# Patient Record
Sex: FEMALE | Race: WHITE | NOT HISPANIC OR LATINO | Employment: FULL TIME | URBAN - METROPOLITAN AREA
[De-identification: names, ages, dates, MRNs, and addresses within clinical notes are randomized per-mention and may not be internally consistent; named-entity substitution may affect disease eponyms.]

---

## 2017-02-08 ENCOUNTER — HOSPITAL ENCOUNTER (OUTPATIENT)
Dept: MAMMOGRAPHY | Facility: HOSPITAL | Age: 57
Discharge: HOME | End: 2017-02-08
Attending: FAMILY MEDICINE | Admitting: FAMILY MEDICINE

## 2017-02-14 ENCOUNTER — HOSPITAL ENCOUNTER (OUTPATIENT)
Dept: OTHER | Facility: HOSPITAL | Age: 57
Discharge: HOME | End: 2017-02-14
Attending: FAMILY MEDICINE | Admitting: FAMILY MEDICINE

## 2017-03-09 ENCOUNTER — HOSPITAL ENCOUNTER (OUTPATIENT)
Dept: OTHER | Facility: HOSPITAL | Age: 57
Discharge: HOME | End: 2017-03-09
Attending: FAMILY MEDICINE | Admitting: FAMILY MEDICINE

## 2017-09-20 ENCOUNTER — HOSPITAL ENCOUNTER (OUTPATIENT)
Dept: ULTRASOUND IMAGING | Facility: HOSPITAL | Age: 57
Discharge: HOME | End: 2017-09-20
Attending: SURGERY | Admitting: SURGERY

## 2017-09-20 LAB
ALBUMIN SERPL-MCNC: 3.8 G/DL (ref 3.5–4.8)
ALBUMIN/GLOB SERPL: 1.4 {RATIO} (ref 1–1.7)
ALP SERPL-CCNC: 71 IU/L (ref 32–91)
ALT SERPL-CCNC: 26 IU/L (ref 14–54)
ANION GAP SERPL CALC-SCNC: 9.2 MMOL/L (ref 10–20)
AST SERPL-CCNC: 25 IU/L (ref 15–41)
BASOPHILS # BLD AUTO: 0.1 10*3/UL (ref 0–0.2)
BASOPHILS NFR BLD AUTO: 1 % (ref 0–2)
BILIRUB SERPL-MCNC: 0.7 MG/DL (ref 0.3–1.2)
BILIRUB UR QL STRIP: NEGATIVE MG/DL
BUN SERPL-MCNC: 16 MG/DL (ref 8–20)
BUN/CREAT SERPL: 20 (ref 5.4–26.2)
CALCIUM SERPL-MCNC: 9 MG/DL (ref 8.9–10.3)
CASTS URNS QL MICRO: ABNORMAL /[LPF]
CHLORIDE SERPL-SCNC: 106 MMOL/L (ref 101–111)
CHOLEST SERPL-MCNC: 164 MG/DL
CHOLEST/HDLC SERPL: 5 {RATIO}
COLOR UR: YELLOW
CONV BACTERIA IN URINE MICRO: NEGATIVE
CONV CLARITY OF URINE: CLEAR
CONV CO2: 28 MMOL/L (ref 22–32)
CONV HYALINE CASTS IN URINE MICRO: 2 /[LPF] (ref 0–5)
CONV LDL CHOLESTEROL DIRECT: 103 MG/DL (ref 0–100)
CONV PROTEIN IN URINE BY AUTOMATED TEST STRIP: NEGATIVE MG/DL
CONV SMALL ROUND CELLS: ABNORMAL /[HPF]
CONV TOTAL PROTEIN: 6.6 G/DL (ref 6.1–7.9)
CONV UROBILINOGEN IN URINE BY AUTOMATED TEST STRIP: 1 MG/DL
CREAT UR-MCNC: 0.8 MG/DL (ref 0.4–1)
CULTURE INDICATED?: ABNORMAL
DIFFERENTIAL METHOD BLD: (no result)
EOSINOPHIL # BLD AUTO: 0.4 10*3/UL (ref 0–0.3)
EOSINOPHIL # BLD AUTO: 5 % (ref 0–3)
ERYTHROCYTE [DISTWIDTH] IN BLOOD BY AUTOMATED COUNT: 13.1 % (ref 11.5–14.5)
GLOBULIN UR ELPH-MCNC: 2.8 G/DL (ref 2.5–3.8)
GLUCOSE SERPL-MCNC: 96 MG/DL (ref 65–99)
GLUCOSE UR QL: NEGATIVE MG/DL
HCT VFR BLD AUTO: 42.6 % (ref 35–49)
HDLC SERPL-MCNC: 33 MG/DL
HGB BLD-MCNC: 14.8 G/DL (ref 12–15)
HGB UR QL STRIP: ABNORMAL
KETONES UR QL STRIP: NEGATIVE MG/DL
LDLC/HDLC SERPL: 3.2 {RATIO}
LEUKOCYTE ESTERASE UR QL STRIP: ABNORMAL
LIPID INTERPRETATION: ABNORMAL
LYMPHOCYTES # BLD AUTO: 1.7 10*3/UL (ref 0.8–4.8)
LYMPHOCYTES NFR BLD AUTO: 20 % (ref 18–42)
MCH RBC QN AUTO: 30.9 PG (ref 26–32)
MCHC RBC AUTO-ENTMCNC: 34.8 G/DL (ref 32–36)
MCV RBC AUTO: 88.9 FL (ref 80–94)
MONOCYTES # BLD AUTO: 0.5 10*3/UL (ref 0.1–1.3)
MONOCYTES NFR BLD AUTO: 7 % (ref 2–11)
NEUTROPHILS # BLD AUTO: 5.6 10*3/UL (ref 2.3–8.6)
NEUTROPHILS NFR BLD AUTO: 67 % (ref 50–75)
NITRITE UR QL STRIP: NEGATIVE
NRBC BLD AUTO-RTO: 0 /100{WBCS}
NRBC/RBC NFR BLD MANUAL: 0 10*3/UL
PH UR STRIP.AUTO: 8.5 [PH] (ref 4.5–8)
PLATELET # BLD AUTO: 266 10*3/UL (ref 150–450)
PMV BLD AUTO: 7.3 FL (ref 7.4–10.4)
POTASSIUM SERPL-SCNC: 4.2 MMOL/L (ref 3.6–5.1)
RBC # BLD AUTO: 4.79 10*6/UL (ref 4–5.4)
RBC #/AREA URNS HPF: 7 /[HPF] (ref 0–3)
SODIUM SERPL-SCNC: 139 MMOL/L (ref 136–144)
SP GR UR: 1.02 (ref 1–1.03)
SPERM URNS QL MICRO: ABNORMAL /[HPF]
SQUAMOUS SPT QL MICRO: 3 /[HPF] (ref 0–5)
T4 FREE SERPL-MCNC: 0.85 NG/DL (ref 0.58–1.64)
TRIGL SERPL-MCNC: 162 MG/DL
TSH SERPL-ACNC: 0.6 UIU/ML (ref 0.34–5.6)
UNIDENT CRYS URNS QL MICRO: ABNORMAL /[HPF]
VIT B12 SERPL-MCNC: 725 PG/ML (ref 180–914)
VLDLC SERPL CALC-MCNC: 28 MG/DL
WBC # BLD AUTO: 8.4 10*3/UL (ref 4.5–11.5)
WBC #/AREA URNS HPF: 5 /[HPF] (ref 0–5)
YEAST SPEC QL WET PREP: ABNORMAL /[HPF]

## 2017-09-21 ENCOUNTER — CONVERSION ENCOUNTER (OUTPATIENT)
Dept: FAMILY MEDICINE CLINIC | Facility: CLINIC | Age: 57
End: 2017-09-21

## 2017-10-02 ENCOUNTER — CONVERSION ENCOUNTER (OUTPATIENT)
Dept: FAMILY MEDICINE CLINIC | Facility: CLINIC | Age: 57
End: 2017-10-02

## 2017-10-10 ENCOUNTER — HOSPITAL ENCOUNTER (OUTPATIENT)
Dept: PREADMISSION TESTING | Facility: HOSPITAL | Age: 57
Discharge: HOME | End: 2017-10-10
Attending: SURGERY | Admitting: SURGERY

## 2017-10-10 LAB
ALBUMIN SERPL-MCNC: 4.1 G/DL (ref 3.5–4.8)
ALBUMIN/GLOB SERPL: 1.6 {RATIO} (ref 1–1.7)
ALP SERPL-CCNC: 71 IU/L (ref 32–91)
ALT SERPL-CCNC: 24 IU/L (ref 14–54)
ANION GAP SERPL CALC-SCNC: 11.8 MMOL/L (ref 10–20)
AST SERPL-CCNC: 26 IU/L (ref 15–41)
BASOPHILS # BLD AUTO: 0.1 10*3/UL (ref 0–0.2)
BASOPHILS NFR BLD AUTO: 1 % (ref 0–2)
BILIRUB SERPL-MCNC: 0.5 MG/DL (ref 0.3–1.2)
BUN SERPL-MCNC: 13 MG/DL (ref 8–20)
BUN/CREAT SERPL: 18.6 (ref 5.4–26.2)
CALCIUM SERPL-MCNC: 9.1 MG/DL (ref 8.9–10.3)
CHLORIDE SERPL-SCNC: 101 MMOL/L (ref 101–111)
CONV CO2: 27 MMOL/L (ref 22–32)
CONV TOTAL PROTEIN: 6.7 G/DL (ref 6.1–7.9)
CREAT UR-MCNC: 0.7 MG/DL (ref 0.4–1)
DIFFERENTIAL METHOD BLD: (no result)
EOSINOPHIL # BLD AUTO: 0.6 10*3/UL (ref 0–0.3)
EOSINOPHIL # BLD AUTO: 7 % (ref 0–3)
ERYTHROCYTE [DISTWIDTH] IN BLOOD BY AUTOMATED COUNT: 13.5 % (ref 11.5–14.5)
GLOBULIN UR ELPH-MCNC: 2.6 G/DL (ref 2.5–3.8)
GLUCOSE SERPL-MCNC: 90 MG/DL (ref 65–99)
HCT VFR BLD AUTO: 43.2 % (ref 35–49)
HGB BLD-MCNC: 14.9 G/DL (ref 12–15)
LYMPHOCYTES # BLD AUTO: 2.6 10*3/UL (ref 0.8–4.8)
LYMPHOCYTES NFR BLD AUTO: 30 % (ref 18–42)
MCH RBC QN AUTO: 31.1 PG (ref 26–32)
MCHC RBC AUTO-ENTMCNC: 34.4 G/DL (ref 32–36)
MCV RBC AUTO: 90.3 FL (ref 80–94)
MONOCYTES # BLD AUTO: 0.6 10*3/UL (ref 0.1–1.3)
MONOCYTES NFR BLD AUTO: 7 % (ref 2–11)
NEUTROPHILS # BLD AUTO: 4.7 10*3/UL (ref 2.3–8.6)
NEUTROPHILS NFR BLD AUTO: 55 % (ref 50–75)
NRBC BLD AUTO-RTO: 0 /100{WBCS}
NRBC/RBC NFR BLD MANUAL: 0 10*3/UL
PLATELET # BLD AUTO: 251 10*3/UL (ref 150–450)
PMV BLD AUTO: 7.8 FL (ref 7.4–10.4)
POTASSIUM SERPL-SCNC: 3.8 MMOL/L (ref 3.6–5.1)
RBC # BLD AUTO: 4.79 10*6/UL (ref 4–5.4)
SODIUM SERPL-SCNC: 136 MMOL/L (ref 136–144)
WBC # BLD AUTO: 8.4 10*3/UL (ref 4.5–11.5)

## 2017-10-13 ENCOUNTER — HOSPITAL ENCOUNTER (OUTPATIENT)
Dept: PREOP | Facility: HOSPITAL | Age: 57
Setting detail: HOSPITAL OUTPATIENT SURGERY
Discharge: HOME | End: 2017-10-13
Attending: SURGERY | Admitting: SURGERY

## 2017-10-17 ENCOUNTER — HOSPITAL ENCOUNTER (OUTPATIENT)
Dept: MAMMOGRAPHY | Facility: HOSPITAL | Age: 57
Discharge: HOME | End: 2017-10-17
Attending: FAMILY MEDICINE | Admitting: FAMILY MEDICINE

## 2018-10-17 ENCOUNTER — HOSPITAL ENCOUNTER (OUTPATIENT)
Dept: MAMMOGRAPHY | Facility: HOSPITAL | Age: 58
Discharge: HOME | End: 2018-10-17
Attending: FAMILY MEDICINE | Admitting: FAMILY MEDICINE

## 2018-10-19 ENCOUNTER — HOSPITAL ENCOUNTER (OUTPATIENT)
Dept: LAB | Facility: HOSPITAL | Age: 58
Discharge: HOME | End: 2018-10-19
Attending: FAMILY MEDICINE | Admitting: FAMILY MEDICINE

## 2018-10-19 LAB
25(OH)D3 SERPL-MCNC: 61 NG/ML (ref 30–100)
ALBUMIN SERPL-MCNC: 3.8 G/DL (ref 3.5–4.8)
ALBUMIN/GLOB SERPL: 1.3 {RATIO} (ref 1–1.7)
ALP SERPL-CCNC: 84 IU/L (ref 32–91)
ALT SERPL-CCNC: 31 IU/L (ref 14–54)
ANA SER QL IA: NEGATIVE
ANION GAP SERPL CALC-SCNC: 12.2 MMOL/L (ref 10–20)
AST SERPL-CCNC: 35 IU/L (ref 15–41)
BASOPHILS # BLD AUTO: 0.1 10*3/UL (ref 0–0.2)
BASOPHILS NFR BLD AUTO: 1 % (ref 0–2)
BILIRUB SERPL-MCNC: 0.9 MG/DL (ref 0.3–1.2)
BILIRUB UR QL STRIP: NEGATIVE MG/DL
BUN SERPL-MCNC: 14 MG/DL (ref 8–20)
BUN/CREAT SERPL: 17.5 (ref 5.4–26.2)
CALCIUM SERPL-MCNC: 9.4 MG/DL (ref 8.9–10.3)
CASTS URNS QL MICRO: NORMAL /[LPF]
CHLORIDE SERPL-SCNC: 100 MMOL/L (ref 101–111)
COLOR UR: YELLOW
CONV BACTERIA IN URINE MICRO: NEGATIVE
CONV CLARITY OF URINE: CLEAR
CONV CO2: 28 MMOL/L (ref 22–32)
CONV HYALINE CASTS IN URINE MICRO: 1 /[LPF] (ref 0–5)
CONV PROTEIN IN URINE BY AUTOMATED TEST STRIP: NEGATIVE MG/DL
CONV SMALL ROUND CELLS: NORMAL /[HPF]
CONV TOTAL PROTEIN: 6.7 G/DL (ref 6.1–7.9)
CONV UROBILINOGEN IN URINE BY AUTOMATED TEST STRIP: 0.2 MG/DL
CREAT UR-MCNC: 0.8 MG/DL (ref 0.4–1)
CULTURE INDICATED?: NORMAL
DIFFERENTIAL METHOD BLD: (no result)
EOSINOPHIL # BLD AUTO: 0.4 10*3/UL (ref 0–0.3)
EOSINOPHIL # BLD AUTO: 4 % (ref 0–3)
ERYTHROCYTE [DISTWIDTH] IN BLOOD BY AUTOMATED COUNT: (no result) %
ERYTHROCYTE [DISTWIDTH] IN BLOOD BY AUTOMATED COUNT: 13.5 % (ref 11.5–14.5)
ERYTHROCYTE [SEDIMENTATION RATE] IN BLOOD BY WESTERGREN METHOD: 10 MM/HR (ref 0–30)
GLOBULIN UR ELPH-MCNC: 2.9 G/DL (ref 2.5–3.8)
GLUCOSE SERPL-MCNC: 96 MG/DL (ref 65–99)
GLUCOSE UR QL: NEGATIVE MG/DL
HCT VFR BLD AUTO: (no result) %
HCT VFR BLD AUTO: 43.8 % (ref 35–49)
HGB BLD-MCNC: (no result) G/DL
HGB BLD-MCNC: 14.8 G/DL (ref 12–15)
HGB UR QL STRIP: NEGATIVE
KETONES UR QL STRIP: NEGATIVE MG/DL
LEUKOCYTE ESTERASE UR QL STRIP: NEGATIVE
LYMPHOCYTES # BLD AUTO: 1.6 10*3/UL (ref 0.8–4.8)
LYMPHOCYTES NFR BLD AUTO: 18 % (ref 18–42)
MCH RBC QN AUTO: (no result) PG
MCH RBC QN AUTO: 31 PG (ref 26–32)
MCHC RBC AUTO-ENTMCNC: (no result) G/DL
MCHC RBC AUTO-ENTMCNC: 33.8 G/DL (ref 32–36)
MCV RBC AUTO: (no result) FL
MCV RBC AUTO: 91.5 FL (ref 80–94)
MONOCYTES # BLD AUTO: 0.6 10*3/UL (ref 0.1–1.3)
MONOCYTES NFR BLD AUTO: 6 % (ref 2–11)
NEUTROPHILS # BLD AUTO: 6.2 10*3/UL (ref 2.3–8.6)
NEUTROPHILS NFR BLD AUTO: 71 % (ref 50–75)
NITRITE UR QL STRIP: NEGATIVE
NRBC BLD AUTO-RTO: 0 /100{WBCS}
NRBC/RBC NFR BLD MANUAL: 0 10*3/UL
PH UR STRIP.AUTO: 7.5 [PH] (ref 4.5–8)
PLATELET # BLD AUTO: (no result) 10*3/UL
PLATELET # BLD AUTO: 252 10*3/UL (ref 150–450)
PMV BLD AUTO: (no result) FL
PMV BLD AUTO: 7.8 FL (ref 7.4–10.4)
POTASSIUM SERPL-SCNC: 4.2 MMOL/L (ref 3.6–5.1)
RBC # BLD AUTO: (no result) 10*6/UL
RBC # BLD AUTO: 4.79 10*6/UL (ref 4–5.4)
RBC #/AREA URNS HPF: 3 /[HPF] (ref 0–3)
SODIUM SERPL-SCNC: 136 MMOL/L (ref 136–144)
SP GR UR: 1.02 (ref 1–1.03)
SPERM URNS QL MICRO: NORMAL /[HPF]
SQUAMOUS SPT QL MICRO: 2 /[HPF] (ref 0–5)
T4 FREE SERPL-MCNC: 0.77 NG/DL (ref 0.58–1.64)
TSH SERPL-ACNC: 1.24 UIU/ML (ref 0.34–5.6)
UNIDENT CRYS URNS QL MICRO: NORMAL /[HPF]
VIT B12 SERPL-MCNC: 1350 PG/ML (ref 180–914)
WBC # BLD AUTO: (no result) 10*3/UL
WBC # BLD AUTO: 8.7 10*3/UL (ref 4.5–11.5)
WBC #/AREA URNS HPF: 1 /[HPF] (ref 0–5)
YEAST SPEC QL WET PREP: NORMAL /[HPF]

## 2018-10-26 ENCOUNTER — HOSPITAL ENCOUNTER (OUTPATIENT)
Dept: FAMILY MEDICINE CLINIC | Facility: CLINIC | Age: 58
Setting detail: SPECIMEN
Discharge: HOME | End: 2018-10-26
Attending: FAMILY MEDICINE | Admitting: FAMILY MEDICINE

## 2018-10-26 ENCOUNTER — CONVERSION ENCOUNTER (OUTPATIENT)
Dept: FAMILY MEDICINE CLINIC | Facility: CLINIC | Age: 58
End: 2018-10-26

## 2018-10-31 ENCOUNTER — HOSPITAL ENCOUNTER (OUTPATIENT)
Dept: GENERAL RADIOLOGY | Facility: HOSPITAL | Age: 58
Discharge: HOME | End: 2018-10-31
Attending: FAMILY MEDICINE | Admitting: FAMILY MEDICINE

## 2019-03-13 ENCOUNTER — HOSPITAL ENCOUNTER (OUTPATIENT)
Dept: LAB | Facility: HOSPITAL | Age: 59
Discharge: HOME | End: 2019-03-13
Attending: FAMILY MEDICINE | Admitting: FAMILY MEDICINE

## 2019-03-13 LAB
ALBUMIN SERPL-MCNC: 3.9 G/DL (ref 3.5–4.8)
ALBUMIN/GLOB SERPL: 1.3 {RATIO} (ref 1–1.7)
ALP SERPL-CCNC: 91 IU/L (ref 32–91)
ALT SERPL-CCNC: 30 IU/L (ref 14–54)
ANION GAP SERPL CALC-SCNC: 12.5 MMOL/L (ref 10–20)
AST SERPL-CCNC: 31 IU/L (ref 15–41)
BILIRUB SERPL-MCNC: 0.3 MG/DL (ref 0.3–1.2)
BILIRUB UR QL STRIP: NEGATIVE MG/DL
BUN SERPL-MCNC: 19 MG/DL (ref 8–20)
BUN/CREAT SERPL: 31.7 (ref 5.4–26.2)
CALCIUM SERPL-MCNC: 8.9 MG/DL (ref 8.9–10.3)
CASTS URNS QL MICRO: ABNORMAL /[LPF]
CHLORIDE SERPL-SCNC: 98 MMOL/L (ref 101–111)
COLOR UR: YELLOW
CONV BACTERIA IN URINE MICRO: NEGATIVE
CONV CLARITY OF URINE: CLEAR
CONV CO2: 28 MMOL/L (ref 22–32)
CONV HYALINE CASTS IN URINE MICRO: 1 /[LPF] (ref 0–5)
CONV PROTEIN IN URINE BY AUTOMATED TEST STRIP: NEGATIVE MG/DL
CONV SMALL ROUND CELLS: ABNORMAL /[HPF]
CONV TOTAL PROTEIN: 6.8 G/DL (ref 6.1–7.9)
CONV UROBILINOGEN IN URINE BY AUTOMATED TEST STRIP: 0.2 MG/DL
CREAT UR-MCNC: 0.6 MG/DL (ref 0.4–1)
CULTURE INDICATED?: ABNORMAL
GLOBULIN UR ELPH-MCNC: 2.9 G/DL (ref 2.5–3.8)
GLUCOSE SERPL-MCNC: 96 MG/DL (ref 65–99)
GLUCOSE UR QL: NEGATIVE MG/DL
HGB UR QL STRIP: NEGATIVE
KETONES UR QL STRIP: NEGATIVE MG/DL
LEUKOCYTE ESTERASE UR QL STRIP: ABNORMAL
MAGNESIUM SERPL-MCNC: 2.1 MG/DL (ref 1.8–2.5)
NITRITE UR QL STRIP: NEGATIVE
PH UR STRIP.AUTO: 7 [PH] (ref 4.5–8)
POTASSIUM SERPL-SCNC: 3.5 MMOL/L (ref 3.6–5.1)
RBC #/AREA URNS HPF: 2 /[HPF] (ref 0–3)
SODIUM SERPL-SCNC: 135 MMOL/L (ref 136–144)
SP GR UR: 1.01 (ref 1–1.03)
SPERM URNS QL MICRO: ABNORMAL /[HPF]
SQUAMOUS SPT QL MICRO: 1 /[HPF] (ref 0–5)
UNIDENT CRYS URNS QL MICRO: ABNORMAL /[HPF]
WBC #/AREA URNS HPF: 2 /[HPF] (ref 0–5)
YEAST SPEC QL WET PREP: ABNORMAL /[HPF]

## 2019-05-10 ENCOUNTER — HOSPITAL ENCOUNTER (OUTPATIENT)
Dept: LAB | Facility: HOSPITAL | Age: 59
Discharge: HOME | End: 2019-05-10
Attending: FAMILY MEDICINE | Admitting: FAMILY MEDICINE

## 2019-05-10 LAB
ANION GAP SERPL CALC-SCNC: 14.9 MMOL/L (ref 10–20)
BUN SERPL-MCNC: 12 MG/DL (ref 8–20)
BUN/CREAT SERPL: 15 (ref 5.4–26.2)
CALCIUM SERPL-MCNC: 9.2 MG/DL (ref 8.9–10.3)
CHLORIDE SERPL-SCNC: 98 MMOL/L (ref 101–111)
CONV CO2: 25 MMOL/L (ref 22–32)
CREAT UR-MCNC: 0.8 MG/DL (ref 0.4–1)
GLUCOSE SERPL-MCNC: 90 MG/DL (ref 65–99)
POTASSIUM SERPL-SCNC: 3.9 MMOL/L (ref 3.6–5.1)
SODIUM SERPL-SCNC: 134 MMOL/L (ref 136–144)

## 2019-06-04 VITALS
DIASTOLIC BLOOD PRESSURE: 80 MMHG | SYSTOLIC BLOOD PRESSURE: 116 MMHG | SYSTOLIC BLOOD PRESSURE: 120 MMHG | HEART RATE: 90 BPM | OXYGEN SATURATION: 97 % | RESPIRATION RATE: 16 BRPM | WEIGHT: 155 LBS | RESPIRATION RATE: 16 BRPM | BODY MASS INDEX: 29.48 KG/M2 | SYSTOLIC BLOOD PRESSURE: 116 MMHG | HEART RATE: 72 BPM | HEIGHT: 61 IN | BODY MASS INDEX: 29.27 KG/M2 | HEART RATE: 88 BPM | WEIGHT: 142 LBS | WEIGHT: 156 LBS | BODY MASS INDEX: 26.83 KG/M2 | DIASTOLIC BLOOD PRESSURE: 80 MMHG | DIASTOLIC BLOOD PRESSURE: 75 MMHG

## 2019-07-09 RX ORDER — POTASSIUM CHLORIDE 20 MEQ/1
TABLET, EXTENDED RELEASE ORAL
Qty: 20 TABLET | Refills: 1 | Status: SHIPPED | OUTPATIENT
Start: 2019-07-09 | End: 2019-08-22 | Stop reason: SDUPTHER

## 2019-08-22 RX ORDER — POTASSIUM CHLORIDE 20 MEQ/1
TABLET, EXTENDED RELEASE ORAL
Qty: 20 TABLET | Refills: 0 | Status: SHIPPED | OUTPATIENT
Start: 2019-08-22 | End: 2019-12-04 | Stop reason: SDUPTHER

## 2019-09-03 RX ORDER — LOSARTAN POTASSIUM AND HYDROCHLOROTHIAZIDE 25; 100 MG/1; MG/1
TABLET ORAL
Qty: 90 TABLET | Refills: 9 | Status: SHIPPED | OUTPATIENT
Start: 2019-09-03 | End: 2019-12-04 | Stop reason: SDUPTHER

## 2019-10-10 ENCOUNTER — TELEPHONE (OUTPATIENT)
Dept: FAMILY MEDICINE CLINIC | Facility: CLINIC | Age: 59
End: 2019-10-10

## 2019-10-14 DIAGNOSIS — Z12.39 SCREENING FOR BREAST CANCER: Primary | ICD-10-CM

## 2019-11-06 ENCOUNTER — TRANSCRIBE ORDERS (OUTPATIENT)
Dept: ADMINISTRATIVE | Facility: HOSPITAL | Age: 59
End: 2019-11-06

## 2019-11-06 DIAGNOSIS — Z12.39 SCREENING BREAST EXAMINATION: Primary | ICD-10-CM

## 2019-11-14 ENCOUNTER — HOSPITAL ENCOUNTER (OUTPATIENT)
Dept: MAMMOGRAPHY | Facility: HOSPITAL | Age: 59
Discharge: HOME | End: 2019-11-14
Admitting: FAMILY MEDICINE

## 2019-11-14 DIAGNOSIS — Z12.39 SCREENING BREAST EXAMINATION: ICD-10-CM

## 2019-11-14 PROCEDURE — 77067 SCR MAMMO BI INCL CAD: CPT

## 2019-11-14 PROCEDURE — 77063 BREAST TOMOSYNTHESIS BI: CPT

## 2019-11-27 ENCOUNTER — TELEPHONE (OUTPATIENT)
Dept: FAMILY MEDICINE CLINIC | Facility: CLINIC | Age: 59
End: 2019-11-27

## 2019-11-27 ENCOUNTER — LAB (OUTPATIENT)
Dept: LAB | Facility: HOSPITAL | Age: 59
End: 2019-11-27

## 2019-11-27 DIAGNOSIS — Z00.00 ANNUAL PHYSICAL EXAM: Primary | ICD-10-CM

## 2019-11-27 DIAGNOSIS — Z00.00 ANNUAL PHYSICAL EXAM: ICD-10-CM

## 2019-11-27 PROCEDURE — 82306 VITAMIN D 25 HYDROXY: CPT

## 2019-11-27 PROCEDURE — 36415 COLL VENOUS BLD VENIPUNCTURE: CPT

## 2019-11-27 PROCEDURE — 80061 LIPID PANEL: CPT

## 2019-11-27 PROCEDURE — 84439 ASSAY OF FREE THYROXINE: CPT

## 2019-11-27 PROCEDURE — 80053 COMPREHEN METABOLIC PANEL: CPT

## 2019-11-27 PROCEDURE — 81001 URINALYSIS AUTO W/SCOPE: CPT

## 2019-11-27 PROCEDURE — 84443 ASSAY THYROID STIM HORMONE: CPT

## 2019-11-27 PROCEDURE — 85025 COMPLETE CBC W/AUTO DIFF WBC: CPT

## 2019-11-27 PROCEDURE — 82607 VITAMIN B-12: CPT

## 2019-11-27 NOTE — TELEPHONE ENCOUNTER
Patient called and she will have her labs done at [facility]. Orders already in. Thank you.

## 2019-11-27 NOTE — TELEPHONE ENCOUNTER
Patient called stating that she is scheduled for physical on 12/4 and would like to have fasting labs done on Friday 11/29 at [facility]. Please enter lab order.

## 2019-11-27 NOTE — TELEPHONE ENCOUNTER
Called left pt a message that we will not be here on 11-29 to come in on 12-2 or 12-3 for labs . Orders are in.

## 2019-11-28 LAB
25(OH)D3 SERPL-MCNC: 56 NG/ML (ref 30–100)
ALBUMIN SERPL-MCNC: 4.4 G/DL (ref 3.5–5.2)
ALBUMIN/GLOB SERPL: 1.5 G/DL
ALP SERPL-CCNC: 73 U/L (ref 39–117)
ALT SERPL W P-5'-P-CCNC: 20 U/L (ref 1–33)
ANION GAP SERPL CALCULATED.3IONS-SCNC: 10.7 MMOL/L (ref 5–15)
AST SERPL-CCNC: 21 U/L (ref 1–32)
BACTERIA UR QL AUTO: NORMAL /HPF
BASOPHILS # BLD AUTO: 0.08 10*3/MM3 (ref 0–0.2)
BASOPHILS NFR BLD AUTO: 1.1 % (ref 0–1.5)
BILIRUB SERPL-MCNC: 0.2 MG/DL (ref 0.2–1.2)
BILIRUB UR QL STRIP: NEGATIVE
BUN BLD-MCNC: 14 MG/DL (ref 8–23)
BUN/CREAT SERPL: 18.7 (ref 7–25)
CALCIUM SPEC-SCNC: 9.3 MG/DL (ref 8.6–10.5)
CHLORIDE SERPL-SCNC: 102 MMOL/L (ref 98–107)
CHOLEST SERPL-MCNC: 178 MG/DL (ref 0–200)
CLARITY UR: CLEAR
CO2 SERPL-SCNC: 28.3 MMOL/L (ref 22–29)
COLOR UR: YELLOW
CREAT BLD-MCNC: 0.75 MG/DL (ref 0.57–1)
DEPRECATED RDW RBC AUTO: 41.6 FL (ref 37–54)
EOSINOPHIL # BLD AUTO: 0.44 10*3/MM3 (ref 0–0.4)
EOSINOPHIL NFR BLD AUTO: 6.3 % (ref 0.3–6.2)
ERYTHROCYTE [DISTWIDTH] IN BLOOD BY AUTOMATED COUNT: 12.3 % (ref 12.3–15.4)
GFR SERPL CREATININE-BSD FRML MDRD: 79 ML/MIN/1.73
GLOBULIN UR ELPH-MCNC: 2.9 GM/DL
GLUCOSE BLD-MCNC: 81 MG/DL (ref 65–99)
GLUCOSE UR STRIP-MCNC: NEGATIVE MG/DL
HCT VFR BLD AUTO: 44 % (ref 34–46.6)
HDLC SERPL-MCNC: 36 MG/DL (ref 40–60)
HGB BLD-MCNC: 14.8 G/DL (ref 12–15.9)
HGB UR QL STRIP.AUTO: NEGATIVE
HYALINE CASTS UR QL AUTO: NORMAL /LPF
IMM GRANULOCYTES # BLD AUTO: 0.03 10*3/MM3 (ref 0–0.05)
IMM GRANULOCYTES NFR BLD AUTO: 0.4 % (ref 0–0.5)
KETONES UR QL STRIP: NEGATIVE
LDLC SERPL CALC-MCNC: 103 MG/DL (ref 0–100)
LDLC/HDLC SERPL: 2.85 {RATIO}
LEUKOCYTE ESTERASE UR QL STRIP.AUTO: ABNORMAL
LYMPHOCYTES # BLD AUTO: 2.63 10*3/MM3 (ref 0.7–3.1)
LYMPHOCYTES NFR BLD AUTO: 37.4 % (ref 19.6–45.3)
MCH RBC QN AUTO: 31.2 PG (ref 26.6–33)
MCHC RBC AUTO-ENTMCNC: 33.6 G/DL (ref 31.5–35.7)
MCV RBC AUTO: 92.8 FL (ref 79–97)
MONOCYTES # BLD AUTO: 0.52 10*3/MM3 (ref 0.1–0.9)
MONOCYTES NFR BLD AUTO: 7.4 % (ref 5–12)
NEUTROPHILS # BLD AUTO: 3.34 10*3/MM3 (ref 1.7–7)
NEUTROPHILS NFR BLD AUTO: 47.4 % (ref 42.7–76)
NITRITE UR QL STRIP: NEGATIVE
NRBC BLD AUTO-RTO: 0 /100 WBC (ref 0–0.2)
PH UR STRIP.AUTO: 8 [PH] (ref 5–8)
PLATELET # BLD AUTO: 256 10*3/MM3 (ref 140–450)
PMV BLD AUTO: 9.5 FL (ref 6–12)
POTASSIUM BLD-SCNC: 3.9 MMOL/L (ref 3.5–5.2)
PROT SERPL-MCNC: 7.3 G/DL (ref 6–8.5)
PROT UR QL STRIP: NEGATIVE
RBC # BLD AUTO: 4.74 10*6/MM3 (ref 3.77–5.28)
RBC # UR: NORMAL /HPF
REF LAB TEST METHOD: NORMAL
SODIUM BLD-SCNC: 141 MMOL/L (ref 136–145)
SP GR UR STRIP: 1.01 (ref 1–1.03)
SQUAMOUS #/AREA URNS HPF: NORMAL /HPF
T4 FREE SERPL-MCNC: 1.21 NG/DL (ref 0.93–1.7)
TRIGL SERPL-MCNC: 197 MG/DL (ref 0–150)
TSH SERPL DL<=0.05 MIU/L-ACNC: 1.17 UIU/ML (ref 0.27–4.2)
UROBILINOGEN UR QL STRIP: ABNORMAL
VIT B12 BLD-MCNC: 1126 PG/ML (ref 211–946)
VLDLC SERPL-MCNC: 39.4 MG/DL (ref 5–40)
WBC NRBC COR # BLD: 7.04 10*3/MM3 (ref 3.4–10.8)
WBC UR QL AUTO: NORMAL /HPF

## 2019-12-02 DIAGNOSIS — E78.2 MIXED HYPERLIPIDEMIA: Primary | ICD-10-CM

## 2019-12-04 ENCOUNTER — OFFICE VISIT (OUTPATIENT)
Dept: FAMILY MEDICINE CLINIC | Facility: CLINIC | Age: 59
End: 2019-12-04

## 2019-12-04 VITALS
DIASTOLIC BLOOD PRESSURE: 76 MMHG | RESPIRATION RATE: 16 BRPM | OXYGEN SATURATION: 97 % | WEIGHT: 155 LBS | BODY MASS INDEX: 29.27 KG/M2 | SYSTOLIC BLOOD PRESSURE: 118 MMHG | HEIGHT: 61 IN | TEMPERATURE: 98.2 F | HEART RATE: 91 BPM

## 2019-12-04 DIAGNOSIS — F10.20 ALCOHOLISM (HCC): ICD-10-CM

## 2019-12-04 DIAGNOSIS — E55.9 VITAMIN D DEFICIENCY: ICD-10-CM

## 2019-12-04 DIAGNOSIS — K21.00 GASTROESOPHAGEAL REFLUX DISEASE WITH ESOPHAGITIS: ICD-10-CM

## 2019-12-04 DIAGNOSIS — I10 ESSENTIAL HYPERTENSION: ICD-10-CM

## 2019-12-04 DIAGNOSIS — Z12.4 SCREENING FOR CERVICAL CANCER: ICD-10-CM

## 2019-12-04 DIAGNOSIS — E78.2 MIXED HYPERLIPIDEMIA: ICD-10-CM

## 2019-12-04 DIAGNOSIS — I25.10 ATHEROSCLEROSIS OF NATIVE CORONARY ARTERY OF NATIVE HEART WITHOUT ANGINA PECTORIS: ICD-10-CM

## 2019-12-04 DIAGNOSIS — Z00.00 PREVENTATIVE HEALTH CARE: Primary | ICD-10-CM

## 2019-12-04 PROBLEM — K80.10 CHOLELITHIASIS WITH CHOLECYSTITIS: Status: ACTIVE | Noted: 2017-09-21

## 2019-12-04 PROCEDURE — 99396 PREV VISIT EST AGE 40-64: CPT | Performed by: FAMILY MEDICINE

## 2019-12-04 RX ORDER — ASPIRIN 325 MG
TABLET, DELAYED RELEASE (ENTERIC COATED) ORAL
COMMUNITY
Start: 2017-09-21 | End: 2022-05-17

## 2019-12-04 RX ORDER — FLUOXETINE HYDROCHLORIDE 40 MG/1
2 CAPSULE ORAL EVERY 24 HOURS
COMMUNITY
Start: 2017-09-22 | End: 2020-02-24

## 2019-12-04 RX ORDER — IBUPROFEN 200 MG
CAPSULE ORAL EVERY 24 HOURS
COMMUNITY
Start: 2018-10-26 | End: 2022-05-12

## 2019-12-04 RX ORDER — CHOLECALCIFEROL (VITAMIN D3) 50 MCG
TABLET ORAL EVERY 24 HOURS
COMMUNITY
Start: 2018-10-26

## 2019-12-04 RX ORDER — LOSARTAN POTASSIUM AND HYDROCHLOROTHIAZIDE 25; 100 MG/1; MG/1
1 TABLET ORAL DAILY
Qty: 90 TABLET | Refills: 2 | Status: SHIPPED | OUTPATIENT
Start: 2019-12-04 | End: 2020-03-03

## 2019-12-04 RX ORDER — POTASSIUM CHLORIDE 20 MEQ/1
20 TABLET, EXTENDED RELEASE ORAL 2 TIMES DAILY
Qty: 90 TABLET | Refills: 2 | Status: SHIPPED | OUTPATIENT
Start: 2019-12-04 | End: 2020-03-03

## 2019-12-04 NOTE — ASSESSMENT & PLAN NOTE
Coronary artery disease is improving with treatment.  Continue current treatment regimen.  Dietary sodium restriction.  Weight loss.  Regular aerobic exercise.  Stop smoking.  Continue current medications.  Medication changes per orders.  Cardiac status will be reassessed in 3 months.    Cath over 10 yrs ago.  Minimal disease  Her blood pressure is well controlled and her lipids are well managed.  She is not having any signs and symptoms of coronary artery disease.  She does need to start exercising more and to lose some weight.  She has plans to do that.

## 2019-12-09 LAB
AGE GDLN ACOG TESTING: NORMAL
CHROM ANALY OVERALL INTERP-IMP: NORMAL
CONV .: NORMAL
CONV PERFORMED BY:: NORMAL
DX ICD CODE: NORMAL
HIV 1 & 2 AB SER-IMP: NORMAL
HPV I/H RISK 1 DNA CVX QL PROBE+SIG AMP: NEGATIVE
REF LAB TEST METHOD: NORMAL
STAT OF ADQ CVX/VAG CYTO-IMP: NORMAL

## 2019-12-25 PROBLEM — Z12.4 SCREENING FOR CERVICAL CANCER: Status: ACTIVE | Noted: 2019-12-25

## 2019-12-25 NOTE — ASSESSMENT & PLAN NOTE
Patient here today for a complete history and physical.  Her past medical history is remarkable only for the disorders as listed below.  All of these are under excellent control.  She had a complete physical today which was normal and we have lab test pending which we anticipate will be normal.  She will try to follow a low-carb diet and start exercise.  Mammogram will be updated.  Colonoscopy is updated.  We will probably suggest a DEXA scan in the next few years.  Immunizations are up-to-date for age.

## 2019-12-25 NOTE — ASSESSMENT & PLAN NOTE
Hypertension is improving with treatment.  Continue current treatment regimen.  Dietary sodium restriction.  Weight loss.  Regular aerobic exercise.  Continue current medications.  Blood pressure will be reassessed in 3 months.    Continue losartan/HCTZ.  Her blood pressure is excellent

## 2019-12-25 NOTE — ASSESSMENT & PLAN NOTE
Pelvic exam done today as part of her complete physical.  Pap smear of the cervix taken.  Pap has returned normal.  She has some atrophic changes of the cells but no suspicious changes of early cancer.  Exam otherwise normal

## 2019-12-25 NOTE — PATIENT INSTRUCTIONS
"Carbohydrate Counting for Diabetes Mellitus, Adult    Carbohydrate counting is a method of keeping track of how many carbohydrates you eat. Eating carbohydrates naturally increases the amount of sugar (glucose) in the blood. Counting how many carbohydrates you eat helps keep your blood glucose within normal limits, which helps you manage your diabetes (diabetes mellitus).  It is important to know how many carbohydrates you can safely have in each meal. This is different for every person. A diet and nutrition specialist (registered dietitian) can help you make a meal plan and calculate how many carbohydrates you should have at each meal and snack.  Carbohydrates are found in the following foods:  · Grains, such as breads and cereals.  · Dried beans and soy products.  · Starchy vegetables, such as potatoes, peas, and corn.  · Fruit and fruit juices.  · Milk and yogurt.  · Sweets and snack foods, such as cake, cookies, candy, chips, and soft drinks.  How do I count carbohydrates?  There are two ways to count carbohydrates in food. You can use either of the methods or a combination of both.  Reading \"Nutrition Facts\" on packaged food  The \"Nutrition Facts\" list is included on the labels of almost all packaged foods and beverages in the [place] It includes:  · The serving size.  · Information about nutrients in each serving, including the grams (g) of carbohydrate per serving.  To use the “Nutrition Facts\":  · Decide how many servings you will have.  · Multiply the number of servings by the number of carbohydrates per serving.  · The resulting number is the total amount of carbohydrates that you will be having.  Learning standard serving sizes of other foods  When you eat carbohydrate foods that are not packaged or do not include \"Nutrition Facts\" on the label, you need to measure the servings in order to count the amount of carbohydrates:  · Measure the foods that you will eat with a food scale or measuring cup, if " needed.  · Decide how many standard-size servings you will eat.  · Multiply the number of servings by 15. Most carbohydrate-rich foods have about 15 g of carbohydrates per serving.  ? For example, if you eat 8 oz (170 g) of strawberries, you will have eaten 2 servings and 30 g of carbohydrates (2 servings x 15 g = 30 g).  · For foods that have more than one food mixed, such as soups and casseroles, you must count the carbohydrates in each food that is included.  The following list contains standard serving sizes of common carbohydrate-rich foods. Each of these servings has about 15 g of carbohydrates:  · ½ hamburger bun or ½ English muffin.  · ½ oz (15 mL) syrup.  · ½ oz (14 g) jelly.  · 1 slice of bread.  · 1 six-inch tortilla.  · 3 oz (85 g) cooked rice or pasta.  · 4 oz (113 g) cooked dried beans.  · 4 oz (113 g) starchy vegetable, such as peas, corn, or potatoes.  · 4 oz (113 g) hot cereal.  · 4 oz (113 g) mashed potatoes or ¼ of a large baked potato.  · 4 oz (113 g) canned or frozen fruit.  · 4 oz (120 mL) fruit juice.  · 4-6 crackers.  · 6 chicken nuggets.  · 6 oz (170 g) unsweetened dry cereal.  · 6 oz (170 g) plain fat-free yogurt or yogurt sweetened with artificial sweeteners.  · 8 oz (240 mL) milk.  · 8 oz (170 g) fresh fruit or one small piece of fruit.  · 24 oz (680 g) popped popcorn.  Example of carbohydrate counting  Sample meal  · 3 oz (85 g) chicken breast.  · 6 oz (170 g) brown rice.  · 4 oz (113 g) corn.  · 8 oz (240 mL) milk.  · 8 oz (170 g) strawberries with sugar-free whipped topping.  Carbohydrate calculation  1. Identify the foods that contain carbohydrates:  ? Rice.  ? Corn.  ? Milk.  ? Strawberries.  2. Calculate how many servings you have of each food:  ? 2 servings rice.  ? 1 serving corn.  ? 1 serving milk.  ? 1 serving strawberries.  3. Multiply each number of servings by 15 g:  ? 2 servings rice x 15 g = 30 g.  ? 1 serving corn x 15 g = 15 g.  ? 1 serving milk x 15 g = 15 g.  ? 1  serving strawberries x 15 g = 15 g.  4. Add together all of the amounts to find the total grams of carbohydrates eaten:  ? 30 g + 15 g + 15 g + 15 g = 75 g of carbohydrates total.  Summary  · Carbohydrate counting is a method of keeping track of how many carbohydrates you eat.  · Eating carbohydrates naturally increases the amount of sugar (glucose) in the blood.  · Counting how many carbohydrates you eat helps keep your blood glucose within normal limits, which helps you manage your diabetes.  · A diet and nutrition specialist (registered dietitian) can help you make a meal plan and calculate how many carbohydrates you should have at each meal and snack.  This information is not intended to replace advice given to you by your health care provider. Make sure you discuss any questions you have with your health care provider.  Document Released: 12/18/2006 Document Revised: 06/27/2018 Document Reviewed: 05/31/2017  [employer] Interactive Patient Education © 2019 [employer]

## 2019-12-25 NOTE — ASSESSMENT & PLAN NOTE
Psychological condition is improving with lifestyle modifications.  Continue current treatment regimen.  Regular aerobic exercise.  Psychological condition  will be reassessed at the next regular appointment.    Patient has been in recovery now for over 5 years.  She is done very well and has not had anything to drink for that length of time.  She has no obsession to drink right now.

## 2019-12-25 NOTE — ASSESSMENT & PLAN NOTE
Patient has reflux symptoms.  As long as she takes a PPI when they act up she does not have any difficulty.  I stressed the importance of treating the symptoms aggressively though.  Over time the esophagus will become injured if she is not careful.

## 2019-12-25 NOTE — ASSESSMENT & PLAN NOTE
Lipid abnormalities are improving with lifestyle modifications.  Nutritional counseling was provided.  Lipids will be reassessed in 6 months.    Patient is doing well.  Her cholesterol and LDL are very acceptable.  Her triglycerides are little elevated so that is pushing her HDL down some.  Low-carb diet, exercise, weight loss will help correct that.  She does not need a statin.

## 2020-02-24 RX ORDER — FLUOXETINE HYDROCHLORIDE 40 MG/1
CAPSULE ORAL
Qty: 60 CAPSULE | Refills: 2 | Status: SHIPPED | OUTPATIENT
Start: 2020-02-24 | End: 2020-05-26

## 2020-05-26 RX ORDER — FLUOXETINE HYDROCHLORIDE 40 MG/1
CAPSULE ORAL
Qty: 60 CAPSULE | Refills: 1 | Status: SHIPPED | OUTPATIENT
Start: 2020-05-26 | End: 2020-10-08

## 2020-10-08 RX ORDER — LOSARTAN POTASSIUM AND HYDROCHLOROTHIAZIDE 25; 100 MG/1; MG/1
TABLET ORAL
Qty: 90 TABLET | Refills: 8 | Status: SHIPPED | OUTPATIENT
Start: 2020-10-08 | End: 2021-03-22

## 2020-10-08 RX ORDER — FLUOXETINE HYDROCHLORIDE 40 MG/1
CAPSULE ORAL
Qty: 60 CAPSULE | Refills: 0 | Status: SHIPPED | OUTPATIENT
Start: 2020-10-08 | End: 2020-10-13

## 2020-10-13 RX ORDER — FLUOXETINE HYDROCHLORIDE 40 MG/1
CAPSULE ORAL
Qty: 60 CAPSULE | Refills: 0 | Status: SHIPPED | OUTPATIENT
Start: 2020-10-13 | End: 2020-11-04

## 2020-11-04 ENCOUNTER — TELEPHONE (OUTPATIENT)
Dept: FAMILY MEDICINE CLINIC | Facility: CLINIC | Age: 60
End: 2020-11-04

## 2020-11-04 DIAGNOSIS — Z12.31 ENCOUNTER FOR SCREENING MAMMOGRAM FOR MALIGNANT NEOPLASM OF BREAST: Primary | ICD-10-CM

## 2020-11-04 RX ORDER — FLUOXETINE HYDROCHLORIDE 40 MG/1
CAPSULE ORAL
Qty: 60 CAPSULE | Refills: 0 | Status: SHIPPED | OUTPATIENT
Start: 2020-11-04 | End: 2021-02-24

## 2020-11-04 NOTE — TELEPHONE ENCOUNTER
Caller: Eloina Shanon J    Relationship to patient: Self    Best call back number: [phone]     Chief complaint:MAMMO, LAB    Type of visit: MAMMO , LAB    Requested date: NOV 2020      If rescheduling, when is the original appointment:     Additional notes:    MS. REDDY IS WANTING TO KNOW WHEN HER   MAMMOGRAM IS DUE, WOULD LIKE DR. YOUNGBLOOD TO ORDER   HER LABS. SHE WOULD LIKE ORDER FOR MAMMOGRAM

## 2020-11-04 NOTE — TELEPHONE ENCOUNTER
Mammogram order has been placed  She just needs lipid panel checked that order is in. The rest of her labs need to be done a week before her physical in March

## 2020-11-04 NOTE — TELEPHONE ENCOUNTER
Gave message to patient at 3:26pm.  She will call [facility] for mammogram appt and will have cholesterol panel done there too.

## 2020-11-17 ENCOUNTER — LAB REQUISITION (OUTPATIENT)
Dept: LAB | Facility: HOSPITAL | Age: 60
End: 2020-11-17

## 2020-11-17 DIAGNOSIS — Z00.00 ROUTINE GENERAL MEDICAL EXAMINATION AT A HEALTH CARE FACILITY: ICD-10-CM

## 2020-11-19 ENCOUNTER — HOSPITAL ENCOUNTER (OUTPATIENT)
Dept: MAMMOGRAPHY | Facility: HOSPITAL | Age: 60
Discharge: HOME | End: 2020-11-19
Admitting: FAMILY MEDICINE

## 2020-11-19 DIAGNOSIS — Z12.31 ENCOUNTER FOR SCREENING MAMMOGRAM FOR MALIGNANT NEOPLASM OF BREAST: ICD-10-CM

## 2020-11-19 LAB
CHOLEST SERPL-MCNC: 200 MG/DL (ref 0–200)
HBA1C MFR BLD: 5 % (ref 3.5–5.6)
HDLC SERPL-MCNC: 42 MG/DL (ref 40–60)
LDLC SERPL CALC-MCNC: 134 MG/DL (ref 0–100)
LDLC/HDLC SERPL: 3.13 {RATIO}
TRIGL SERPL-MCNC: 132 MG/DL (ref 0–150)
VLDLC SERPL-MCNC: 24 MG/DL (ref 5–40)

## 2020-11-19 PROCEDURE — 80061 LIPID PANEL: CPT

## 2020-11-19 PROCEDURE — 83036 HEMOGLOBIN GLYCOSYLATED A1C: CPT

## 2020-11-19 PROCEDURE — 77067 SCR MAMMO BI INCL CAD: CPT

## 2020-11-19 PROCEDURE — 77063 BREAST TOMOSYNTHESIS BI: CPT

## 2020-12-30 ENCOUNTER — TELEPHONE (OUTPATIENT)
Dept: FAMILY MEDICINE CLINIC | Facility: CLINIC | Age: 60
End: 2020-12-30

## 2020-12-30 PROCEDURE — U0003 INFECTIOUS AGENT DETECTION BY NUCLEIC ACID (DNA OR RNA); SEVERE ACUTE RESPIRATORY SYNDROME CORONAVIRUS 2 (SARS-COV-2) (CORONAVIRUS DISEASE [COVID-19]), AMPLIFIED PROBE TECHNIQUE, MAKING USE OF HIGH THROUGHPUT TECHNOLOGIES AS DESCRIBED BY CMS-2020-01-R: HCPCS | Performed by: NURSE PRACTITIONER

## 2020-12-30 NOTE — TELEPHONE ENCOUNTER
Patient is wanting a covid test and an antibiotic. Patient has an unproductive cough and her eye is itchy and watering. Patient said she just does not feel well overall. Please advise.     [facility] - [place]

## 2021-01-01 RX ORDER — PREDNISONE 20 MG/1
TABLET ORAL
Qty: 25 TABLET | Refills: 1 | Status: SHIPPED | OUTPATIENT
Start: 2021-01-01 | End: 2021-03-12

## 2021-01-01 RX ORDER — AZITHROMYCIN 500 MG/1
500 TABLET, FILM COATED ORAL DAILY
Qty: 5 TABLET | Refills: 3 | Status: SHIPPED | OUTPATIENT
Start: 2021-01-01 | End: 2021-01-06

## 2021-01-04 DIAGNOSIS — J01.90 ACUTE NON-RECURRENT SINUSITIS, UNSPECIFIED LOCATION: ICD-10-CM

## 2021-01-04 RX ORDER — BROMPHENIRAMINE MALEATE, PSEUDOEPHEDRINE HYDROCHLORIDE, AND DEXTROMETHORPHAN HYDROBROMIDE 2; 30; 10 MG/5ML; MG/5ML; MG/5ML
5 SYRUP ORAL 4 TIMES DAILY PRN
Qty: 240 ML | Refills: 0 | Status: SHIPPED | OUTPATIENT
Start: 2021-01-04 | End: 2021-01-14

## 2021-02-24 RX ORDER — FLUOXETINE HYDROCHLORIDE 40 MG/1
80 CAPSULE ORAL DAILY
Qty: 60 CAPSULE | Refills: 1 | Status: SHIPPED | OUTPATIENT
Start: 2021-02-24 | End: 2021-05-18 | Stop reason: SDUPTHER

## 2021-03-12 ENCOUNTER — LAB (OUTPATIENT)
Dept: FAMILY MEDICINE CLINIC | Facility: CLINIC | Age: 61
End: 2021-03-12

## 2021-03-12 ENCOUNTER — OFFICE VISIT (OUTPATIENT)
Dept: FAMILY MEDICINE CLINIC | Facility: CLINIC | Age: 61
End: 2021-03-12

## 2021-03-12 VITALS
HEIGHT: 61 IN | WEIGHT: 152 LBS | RESPIRATION RATE: 16 BRPM | TEMPERATURE: 97.1 F | BODY MASS INDEX: 28.7 KG/M2 | OXYGEN SATURATION: 96 % | DIASTOLIC BLOOD PRESSURE: 70 MMHG | SYSTOLIC BLOOD PRESSURE: 110 MMHG | HEART RATE: 77 BPM

## 2021-03-12 DIAGNOSIS — I10 ESSENTIAL HYPERTENSION: ICD-10-CM

## 2021-03-12 DIAGNOSIS — K21.00 GASTROESOPHAGEAL REFLUX DISEASE WITH ESOPHAGITIS WITHOUT HEMORRHAGE: ICD-10-CM

## 2021-03-12 DIAGNOSIS — Z00.00 PREVENTATIVE HEALTH CARE: Primary | ICD-10-CM

## 2021-03-12 DIAGNOSIS — I25.10 ATHEROSCLEROSIS OF NATIVE CORONARY ARTERY OF NATIVE HEART WITHOUT ANGINA PECTORIS: ICD-10-CM

## 2021-03-12 DIAGNOSIS — E55.9 VITAMIN D DEFICIENCY: ICD-10-CM

## 2021-03-12 LAB
25(OH)D3 SERPL-MCNC: 99.9 NG/ML
ALBUMIN SERPL-MCNC: 5.1 G/DL (ref 3.5–5.2)
ALBUMIN/GLOB SERPL: 1.6 G/DL
ALP SERPL-CCNC: 87 U/L (ref 39–117)
ALT SERPL W P-5'-P-CCNC: 25 U/L (ref 1–33)
ANION GAP SERPL CALCULATED.3IONS-SCNC: 12.2 MMOL/L (ref 5–15)
AST SERPL-CCNC: 27 U/L (ref 1–32)
BACTERIA UR QL AUTO: NORMAL /HPF
BASOPHILS # BLD AUTO: 0.1 10*3/MM3 (ref 0–0.2)
BASOPHILS NFR BLD AUTO: 0.8 % (ref 0–1.5)
BILIRUB SERPL-MCNC: 0.9 MG/DL (ref 0–1.2)
BILIRUB UR QL STRIP: NEGATIVE
BUN SERPL-MCNC: 13 MG/DL (ref 8–23)
BUN/CREAT SERPL: 16 (ref 7–25)
CALCIUM SPEC-SCNC: 9.8 MG/DL (ref 8.6–10.5)
CHLORIDE SERPL-SCNC: 101 MMOL/L (ref 98–107)
CHOLEST SERPL-MCNC: 208 MG/DL (ref 0–200)
CLARITY UR: CLEAR
CO2 SERPL-SCNC: 28.8 MMOL/L (ref 22–29)
COLOR UR: YELLOW
CREAT SERPL-MCNC: 0.81 MG/DL (ref 0.57–1)
DEPRECATED RDW RBC AUTO: 44.6 FL (ref 37–54)
EOSINOPHIL # BLD AUTO: 0.6 10*3/MM3 (ref 0–0.4)
EOSINOPHIL NFR BLD AUTO: 5.3 % (ref 0.3–6.2)
ERYTHROCYTE [DISTWIDTH] IN BLOOD BY AUTOMATED COUNT: 13.8 % (ref 12.3–15.4)
GFR SERPL CREATININE-BSD FRML MDRD: 72 ML/MIN/1.73
GLOBULIN UR ELPH-MCNC: 3.1 GM/DL
GLUCOSE SERPL-MCNC: 93 MG/DL (ref 65–99)
GLUCOSE UR STRIP-MCNC: NEGATIVE MG/DL
HBA1C MFR BLD: 5.2 % (ref 3.5–5.6)
HCT VFR BLD AUTO: 48.5 % (ref 34–46.6)
HDLC SERPL-MCNC: 43 MG/DL (ref 40–60)
HGB BLD-MCNC: 16.2 G/DL (ref 12–15.9)
HGB UR QL STRIP.AUTO: NEGATIVE
HYALINE CASTS UR QL AUTO: NORMAL /LPF
KETONES UR QL STRIP: NEGATIVE
LDLC SERPL CALC-MCNC: 139 MG/DL (ref 0–100)
LDLC/HDLC SERPL: 3.15 {RATIO}
LEUKOCYTE ESTERASE UR QL STRIP.AUTO: ABNORMAL
LYMPHOCYTES # BLD AUTO: 2.5 10*3/MM3 (ref 0.7–3.1)
LYMPHOCYTES NFR BLD AUTO: 22.8 % (ref 19.6–45.3)
MCH RBC QN AUTO: 31 PG (ref 26.6–33)
MCHC RBC AUTO-ENTMCNC: 33.4 G/DL (ref 31.5–35.7)
MCV RBC AUTO: 92.8 FL (ref 79–97)
MONOCYTES # BLD AUTO: 0.6 10*3/MM3 (ref 0.1–0.9)
MONOCYTES NFR BLD AUTO: 5 % (ref 5–12)
NEUTROPHILS NFR BLD AUTO: 66.1 % (ref 42.7–76)
NEUTROPHILS NFR BLD AUTO: 7.3 10*3/MM3 (ref 1.7–7)
NITRITE UR QL STRIP: NEGATIVE
NRBC BLD AUTO-RTO: 0.2 /100 WBC (ref 0–0.2)
PH UR STRIP.AUTO: 7.5 [PH] (ref 5–8)
PLATELET # BLD AUTO: 272 10*3/MM3 (ref 140–450)
PMV BLD AUTO: 8.4 FL (ref 6–12)
POTASSIUM SERPL-SCNC: 4.1 MMOL/L (ref 3.5–5.2)
PROT SERPL-MCNC: 8.2 G/DL (ref 6–8.5)
PROT UR QL STRIP: NEGATIVE
RBC # BLD AUTO: 5.23 10*6/MM3 (ref 3.77–5.28)
RBC # UR: NORMAL /HPF
REF LAB TEST METHOD: NORMAL
SODIUM SERPL-SCNC: 142 MMOL/L (ref 136–145)
SP GR UR STRIP: 1.01 (ref 1–1.03)
SQUAMOUS #/AREA URNS HPF: NORMAL /HPF
T4 FREE SERPL-MCNC: 1.26 NG/DL (ref 0.93–1.7)
TRIGL SERPL-MCNC: 147 MG/DL (ref 0–150)
TSH SERPL DL<=0.05 MIU/L-ACNC: 0.66 UIU/ML (ref 0.27–4.2)
UROBILINOGEN UR QL STRIP: ABNORMAL
VIT B12 BLD-MCNC: 1328 PG/ML (ref 211–946)
VLDLC SERPL-MCNC: 26 MG/DL (ref 5–40)
WBC # BLD AUTO: 11.1 10*3/MM3 (ref 3.4–10.8)
WBC UR QL AUTO: NORMAL /HPF

## 2021-03-12 PROCEDURE — 82607 VITAMIN B-12: CPT | Performed by: FAMILY MEDICINE

## 2021-03-12 PROCEDURE — 80053 COMPREHEN METABOLIC PANEL: CPT | Performed by: FAMILY MEDICINE

## 2021-03-12 PROCEDURE — 81001 URINALYSIS AUTO W/SCOPE: CPT | Performed by: FAMILY MEDICINE

## 2021-03-12 PROCEDURE — 82306 VITAMIN D 25 HYDROXY: CPT | Performed by: FAMILY MEDICINE

## 2021-03-12 PROCEDURE — 99396 PREV VISIT EST AGE 40-64: CPT | Performed by: FAMILY MEDICINE

## 2021-03-12 PROCEDURE — 83036 HEMOGLOBIN GLYCOSYLATED A1C: CPT | Performed by: FAMILY MEDICINE

## 2021-03-12 PROCEDURE — 84443 ASSAY THYROID STIM HORMONE: CPT | Performed by: FAMILY MEDICINE

## 2021-03-12 PROCEDURE — 84439 ASSAY OF FREE THYROXINE: CPT | Performed by: FAMILY MEDICINE

## 2021-03-12 PROCEDURE — 85025 COMPLETE CBC W/AUTO DIFF WBC: CPT | Performed by: FAMILY MEDICINE

## 2021-03-12 PROCEDURE — 80061 LIPID PANEL: CPT | Performed by: FAMILY MEDICINE

## 2021-03-12 NOTE — ASSESSMENT & PLAN NOTE
Hypertension is improving with treatment.  Continue current treatment regimen.  Dietary sodium restriction.  Weight loss.  Regular aerobic exercise.  Stop smoking.  Continue current medications.  Blood pressure will be reassessed at the next regular appointment.    Patient's could continue taking Hyzaar 100/25 1 every day and her blood pressure checks have been excellent

## 2021-03-12 NOTE — PROGRESS NOTES
"Chief Complaint  Annual Exam    Subjective          Shanon Duvall presents to [facility]  Patient here today for yearly check-up.  Carries had a reasonably good year.  She just wants a checkup and some lab work today.  We reviewed her medicine list and we will continue to monitor those.      Objective   Vital Signs:   /70 (BP Location: Right arm)   Pulse 77   Temp 97.1 °F (36.2 °C) (Temporal)   Resp 16   Ht 154.9 cm (61\")   Wt 68.9 kg (152 lb)   SpO2 96%   BMI 28.72 kg/m²     Physical Exam  Constitutional:       Appearance: Normal appearance. She is well-developed and normal weight.   HENT:      Head: Normocephalic and atraumatic.      Right Ear: Tympanic membrane, ear canal and external ear normal.      Left Ear: Tympanic membrane, ear canal and external ear normal.      Nose: Nose normal.      Mouth/Throat:      Mouth: Mucous membranes are moist.      Pharynx: Oropharynx is clear. No oropharyngeal exudate.   Eyes:      Extraocular Movements: Extraocular movements intact.      Conjunctiva/sclera: Conjunctivae normal.      Pupils: Pupils are equal, round, and reactive to light.   Cardiovascular:      Rate and Rhythm: Normal rate and regular rhythm.      Pulses: Normal pulses.      Heart sounds: Normal heart sounds.   Pulmonary:      Effort: Pulmonary effort is normal.      Breath sounds: Normal breath sounds.   Abdominal:      General: Bowel sounds are normal.      Palpations: Abdomen is soft.   Musculoskeletal:         General: Normal range of motion.      Cervical back: Normal range of motion and neck supple.   Skin:     General: Skin is warm and dry.   Neurological:      General: No focal deficit present.      Mental Status: She is alert and oriented to person, place, and time. Mental status is at baseline.   Psychiatric:         Mood and Affect: Mood normal.         Behavior: Behavior normal.         Thought Content: Thought content normal.         Judgment: " Judgment normal.        Result Review :                 Assessment and Plan    Diagnoses and all orders for this visit:    1. Preventative health care (Primary)  Assessment & Plan:  Up to date with colonoscopy,  Mammo, PAP and immunizatiosn      2. Atherosclerosis of native coronary artery of native heart without angina pectoris    3. Essential hypertension  Assessment & Plan:  Hypertension is improving with treatment.  Continue current treatment regimen.  Dietary sodium restriction.  Weight loss.  Regular aerobic exercise.  Stop smoking.  Continue current medications.  Blood pressure will be reassessed at the next regular appointment.    Patient's could continue taking Hyzaar 100/25 1 every day and her blood pressure checks have been excellent      4. Vitamin D deficiency  Assessment & Plan:  Continue vitamin D placement      5. Gastroesophageal reflux disease with esophagitis without hemorrhage  Assessment & Plan:  Patient uses as needed PPI when needed        Follow Up   Return in about 1 year (around 3/12/2022) for Annual physical.  Patient was given instructions and counseling regarding her condition or for health maintenance advice. Please see specific information pulled into the AVS if appropriate.

## 2021-03-12 NOTE — PATIENT INSTRUCTIONS
Medicare Wellness  Personal Prevention Plan of Service     Date of Office Visit:  2021  Encounter Provider:  Shay Espinoza MD  Place of Service:  [facility]  Patient Name: Shanon Duvall  :  1960    As part of the Medicare Wellness portion of your visit today, we are providing you with this personalized preventive plan of services (PPPS). This plan is based upon recommendations of the [place] Preventive Services Task Force (USPSTF) and the Advisory Committee on Immunization Practices (ACIP).    This lists the preventive care services that should be considered, and provides dates of when you are due. Items listed as completed are up-to-date and do not require any further intervention.    Health Maintenance   Topic Date Due   • Pneumococcal Vaccine 0-64 (1 of 1 - PPSV23) Never done   • COVID-19 Vaccine (1 of 2) Never done   • TDAP/TD VACCINES (1 - Tdap) Never done   • ZOSTER VACCINE (1 of 2) Never done   • HEPATITIS C SCREENING  Never done   • ANNUAL PHYSICAL  2020   • LIPID PANEL  2021   • MAMMOGRAM  2022   • PAP SMEAR  2022   • COLONOSCOPY  10/21/2026   • INFLUENZA VACCINE  Completed   • MENINGOCOCCAL VACCINE  Aged Out       No orders of the defined types were placed in this encounter.      No follow-ups on file.

## 2021-03-18 RX ORDER — LOSARTAN POTASSIUM AND HYDROCHLOROTHIAZIDE 25; 100 MG/1; MG/1
1 TABLET ORAL DAILY
Qty: 90 TABLET | Refills: 8 | Status: CANCELLED | OUTPATIENT
Start: 2021-03-18

## 2021-03-20 NOTE — PROGRESS NOTES
Tell Shanon that her cholesterol profile is about the same.  Her 10-year risk is under 4% so she does not need a statin yet but as she gets older if it does not come down she will eventually need a statin.  Low-carb diet exercise and will follow what year to year.

## 2021-03-22 RX ORDER — LOSARTAN POTASSIUM AND HYDROCHLOROTHIAZIDE 25; 100 MG/1; MG/1
1 TABLET ORAL DAILY
Qty: 90 TABLET | Refills: 3 | Status: SHIPPED | OUTPATIENT
Start: 2021-03-22 | End: 2022-04-14 | Stop reason: SDUPTHER

## 2021-04-14 RX ORDER — AZITHROMYCIN 250 MG/1
TABLET, FILM COATED ORAL
Qty: 6 TABLET | Refills: 0 | Status: SHIPPED | OUTPATIENT
Start: 2021-04-14 | End: 2021-07-14 | Stop reason: SDUPTHER

## 2021-04-14 RX ORDER — BROMPHENIRAMINE MALEATE, PSEUDOEPHEDRINE HYDROCHLORIDE, AND DEXTROMETHORPHAN HYDROBROMIDE 2; 30; 10 MG/5ML; MG/5ML; MG/5ML
5 SYRUP ORAL 4 TIMES DAILY PRN
Qty: 240 ML | Refills: 1 | Status: SHIPPED | OUTPATIENT
Start: 2021-04-14 | End: 2021-04-26

## 2021-05-19 RX ORDER — FLUOXETINE HYDROCHLORIDE 40 MG/1
80 CAPSULE ORAL DAILY
Qty: 60 CAPSULE | Refills: 1 | Status: SHIPPED | OUTPATIENT
Start: 2021-05-19 | End: 2021-07-13 | Stop reason: SDUPTHER

## 2021-06-10 DIAGNOSIS — F41.9 MILD ANXIETY: Primary | ICD-10-CM

## 2021-06-10 RX ORDER — ALPRAZOLAM 0.25 MG/1
0.25 TABLET ORAL EVERY 8 HOURS PRN
Qty: 30 TABLET | Refills: 0 | Status: SHIPPED | OUTPATIENT
Start: 2021-06-10 | End: 2022-05-12 | Stop reason: SDUPTHER

## 2021-07-14 RX ORDER — METHYLPREDNISOLONE 4 MG/1
TABLET ORAL
Qty: 1 EACH | Refills: 0 | Status: SHIPPED | OUTPATIENT
Start: 2021-07-14 | End: 2021-11-18

## 2021-07-14 RX ORDER — DEXTROMETHORPHAN HYDROBROMIDE AND PROMETHAZINE HYDROCHLORIDE 15; 6.25 MG/5ML; MG/5ML
5 SYRUP ORAL 4 TIMES DAILY PRN
Qty: 180 ML | Refills: 0 | Status: SHIPPED | OUTPATIENT
Start: 2021-07-14 | End: 2022-05-12

## 2021-07-14 RX ORDER — FLUOXETINE HYDROCHLORIDE 40 MG/1
80 CAPSULE ORAL DAILY
Qty: 180 CAPSULE | Refills: 1 | Status: SHIPPED | OUTPATIENT
Start: 2021-07-14 | End: 2022-01-17 | Stop reason: SDUPTHER

## 2021-07-14 RX ORDER — AZITHROMYCIN 250 MG/1
TABLET, FILM COATED ORAL
Qty: 6 TABLET | Refills: 0 | Status: SHIPPED | OUTPATIENT
Start: 2021-07-14 | End: 2022-05-12

## 2021-07-14 NOTE — TELEPHONE ENCOUNTER
Caller: Shanon Duvall    Relationship: Self    Best call back number: [phone]    What is the best time to reach you: ANY    Who are you requesting to speak with (clinical staff, provider,  specific staff member): ARTURO OR INOCENTE    Do you know the name of the person who called:     What was the call regarding: STATED SHE HAD JUST TALKED TO DR YOUNGBLOOD AND HE WANTED HER TO CALL [facility] ASK FOR EITHER ONE OF THEM TO HELP THE PATIENT     Do you require a callback: YES

## 2021-07-14 NOTE — TELEPHONE ENCOUNTER
has spoke with patient at home today.  She has been having symptoms of sinus infection that now is in chest.     He was wanting to send z svitlana, medrol pack and cough syrup to pharmacy     Can you please send? I have them loaded to go        NKA

## 2021-11-03 ENCOUNTER — TELEPHONE (OUTPATIENT)
Dept: FAMILY MEDICINE CLINIC | Facility: CLINIC | Age: 61
End: 2021-11-03

## 2021-11-03 ENCOUNTER — TRANSCRIBE ORDERS (OUTPATIENT)
Dept: ADMINISTRATIVE | Facility: HOSPITAL | Age: 61
End: 2021-11-03

## 2021-11-03 DIAGNOSIS — Z12.31 VISIT FOR SCREENING MAMMOGRAM: Primary | ICD-10-CM

## 2021-11-03 NOTE — TELEPHONE ENCOUNTER
Left detailed message on patient's voice mail at 3:28pm to check with her insurance company to see if they will cover labs again this year.

## 2021-11-03 NOTE — TELEPHONE ENCOUNTER
Caller: Shanon Duvall    Relationship: Self    Best call back number: [phone]    What orders are you requesting (i.e. lab or imaging): LABS .     FIRST PHYSICAL IS 4/7/2022 AND PATIENT WOULD LIKE TO GET LABS DRAWN IN THE NEXT MONTH    Where will you receive your lab/imaging services: [facility]

## 2021-11-18 ENCOUNTER — TELEPHONE (OUTPATIENT)
Dept: FAMILY MEDICINE CLINIC | Facility: CLINIC | Age: 61
End: 2021-11-18

## 2021-11-18 RX ORDER — BROMPHENIRAMINE MALEATE, PSEUDOEPHEDRINE HYDROCHLORIDE, AND DEXTROMETHORPHAN HYDROBROMIDE 2; 30; 10 MG/5ML; MG/5ML; MG/5ML
5-10 SYRUP ORAL 4 TIMES DAILY PRN
Qty: 240 ML | Refills: 1 | Status: SHIPPED | OUTPATIENT
Start: 2021-11-18 | End: 2021-11-28

## 2021-11-18 RX ORDER — PREDNISONE 20 MG/1
TABLET ORAL
Qty: 25 TABLET | Refills: 1 | Status: SHIPPED | OUTPATIENT
Start: 2021-11-18 | End: 2022-05-12

## 2021-11-18 RX ORDER — AMOXICILLIN 500 MG/1
1000 CAPSULE ORAL 3 TIMES DAILY
Qty: 60 CAPSULE | Refills: 1 | Status: SHIPPED | OUTPATIENT
Start: 2021-11-18 | End: 2021-11-28

## 2021-11-18 NOTE — TELEPHONE ENCOUNTER
Caller: Shanon Duvall    Relationship: Self    Best call back number: [phone]     What medication are you requesting: COUGH MEDICINE, ANTIBIOTIC    What are your current symptoms: COUGH, HEAD CONGESTION ,  SINUS DRAINAGE    How long have you been experiencing symptoms: ALMOST A WEEK    Have you had these symptoms before:    [x] Yes  [] No    Have you been treated for these symptoms before:   [x] Yes  [] No    If a prescription is needed, what is your preferred pharmacy and phone number: [facility]     Additional notes:

## 2021-11-22 ENCOUNTER — HOSPITAL ENCOUNTER (OUTPATIENT)
Dept: MAMMOGRAPHY | Facility: HOSPITAL | Age: 61
Discharge: HOME | End: 2021-11-22
Admitting: FAMILY MEDICINE

## 2021-11-22 DIAGNOSIS — Z12.31 VISIT FOR SCREENING MAMMOGRAM: ICD-10-CM

## 2021-11-22 PROCEDURE — 77063 BREAST TOMOSYNTHESIS BI: CPT

## 2021-11-22 PROCEDURE — 77067 SCR MAMMO BI INCL CAD: CPT

## 2022-01-17 RX ORDER — FLUOXETINE HYDROCHLORIDE 40 MG/1
80 CAPSULE ORAL DAILY
Qty: 180 CAPSULE | Refills: 1 | Status: SHIPPED | OUTPATIENT
Start: 2022-01-17 | End: 2022-05-17

## 2022-04-14 RX ORDER — LOSARTAN POTASSIUM AND HYDROCHLOROTHIAZIDE 25; 100 MG/1; MG/1
1 TABLET ORAL DAILY
Qty: 90 TABLET | Refills: 3 | Status: CANCELLED | OUTPATIENT
Start: 2022-04-14

## 2022-04-14 RX ORDER — LOSARTAN POTASSIUM AND HYDROCHLOROTHIAZIDE 25; 100 MG/1; MG/1
1 TABLET ORAL DAILY
Qty: 90 TABLET | Refills: 3 | Status: SHIPPED | OUTPATIENT
Start: 2022-04-14 | End: 2022-05-17

## 2022-05-12 ENCOUNTER — OFFICE VISIT (OUTPATIENT)
Dept: FAMILY MEDICINE CLINIC | Facility: CLINIC | Age: 62
End: 2022-05-12

## 2022-05-12 ENCOUNTER — LAB (OUTPATIENT)
Dept: FAMILY MEDICINE CLINIC | Facility: CLINIC | Age: 62
End: 2022-05-12

## 2022-05-12 VITALS
HEIGHT: 61 IN | OXYGEN SATURATION: 98 % | SYSTOLIC BLOOD PRESSURE: 128 MMHG | DIASTOLIC BLOOD PRESSURE: 80 MMHG | RESPIRATION RATE: 16 BRPM | WEIGHT: 149 LBS | TEMPERATURE: 97.9 F | BODY MASS INDEX: 28.13 KG/M2 | HEART RATE: 89 BPM

## 2022-05-12 DIAGNOSIS — Z12.4 SCREENING FOR CERVICAL CANCER: ICD-10-CM

## 2022-05-12 DIAGNOSIS — F10.20 ALCOHOLISM: ICD-10-CM

## 2022-05-12 DIAGNOSIS — I25.10 ATHEROSCLEROSIS OF NATIVE CORONARY ARTERY OF NATIVE HEART WITHOUT ANGINA PECTORIS: ICD-10-CM

## 2022-05-12 DIAGNOSIS — I10 PRIMARY HYPERTENSION: ICD-10-CM

## 2022-05-12 DIAGNOSIS — F41.9 MILD ANXIETY: ICD-10-CM

## 2022-05-12 DIAGNOSIS — Z00.00 PREVENTATIVE HEALTH CARE: ICD-10-CM

## 2022-05-12 DIAGNOSIS — E55.9 VITAMIN D DEFICIENCY: ICD-10-CM

## 2022-05-12 DIAGNOSIS — Z00.00 PREVENTATIVE HEALTH CARE: Primary | ICD-10-CM

## 2022-05-12 LAB
BACTERIA UR QL AUTO: ABNORMAL /HPF
BASOPHILS # BLD AUTO: 0.11 10*3/MM3 (ref 0–0.2)
BASOPHILS NFR BLD AUTO: 1.4 % (ref 0–1.5)
BILIRUB UR QL STRIP: NEGATIVE
CLARITY UR: CLEAR
COLOR UR: YELLOW
DEPRECATED RDW RBC AUTO: 43.8 FL (ref 37–54)
EOSINOPHIL # BLD AUTO: 0.42 10*3/MM3 (ref 0–0.4)
EOSINOPHIL NFR BLD AUTO: 5.2 % (ref 0.3–6.2)
ERYTHROCYTE [DISTWIDTH] IN BLOOD BY AUTOMATED COUNT: 12.6 % (ref 12.3–15.4)
GLUCOSE UR STRIP-MCNC: NEGATIVE MG/DL
HCT VFR BLD AUTO: 51 % (ref 34–46.6)
HGB BLD-MCNC: 16.5 G/DL (ref 12–15.9)
HGB UR QL STRIP.AUTO: NEGATIVE
HYALINE CASTS UR QL AUTO: ABNORMAL /LPF
IMM GRANULOCYTES # BLD AUTO: 0.04 10*3/MM3 (ref 0–0.05)
IMM GRANULOCYTES NFR BLD AUTO: 0.5 % (ref 0–0.5)
KETONES UR QL STRIP: NEGATIVE
LEUKOCYTE ESTERASE UR QL STRIP.AUTO: ABNORMAL
LYMPHOCYTES # BLD AUTO: 2.29 10*3/MM3 (ref 0.7–3.1)
LYMPHOCYTES NFR BLD AUTO: 28.3 % (ref 19.6–45.3)
MCH RBC QN AUTO: 30.6 PG (ref 26.6–33)
MCHC RBC AUTO-ENTMCNC: 32.4 G/DL (ref 31.5–35.7)
MCV RBC AUTO: 94.6 FL (ref 79–97)
MONOCYTES # BLD AUTO: 0.54 10*3/MM3 (ref 0.1–0.9)
MONOCYTES NFR BLD AUTO: 6.7 % (ref 5–12)
NEUTROPHILS NFR BLD AUTO: 4.7 10*3/MM3 (ref 1.7–7)
NEUTROPHILS NFR BLD AUTO: 57.9 % (ref 42.7–76)
NITRITE UR QL STRIP: NEGATIVE
NRBC BLD AUTO-RTO: 0 /100 WBC (ref 0–0.2)
PH UR STRIP.AUTO: 7.5 [PH] (ref 5–8)
PLATELET # BLD AUTO: 284 10*3/MM3 (ref 140–450)
PMV BLD AUTO: 10 FL (ref 6–12)
PROT UR QL STRIP: NEGATIVE
RBC # BLD AUTO: 5.39 10*6/MM3 (ref 3.77–5.28)
RBC # UR STRIP: ABNORMAL /HPF
REF LAB TEST METHOD: ABNORMAL
SP GR UR STRIP: 1.01 (ref 1–1.03)
SQUAMOUS #/AREA URNS HPF: ABNORMAL /HPF
UROBILINOGEN UR QL STRIP: ABNORMAL
WBC # UR STRIP: ABNORMAL /HPF
WBC NRBC COR # BLD: 8.1 10*3/MM3 (ref 3.4–10.8)

## 2022-05-12 PROCEDURE — 82306 VITAMIN D 25 HYDROXY: CPT | Performed by: FAMILY MEDICINE

## 2022-05-12 PROCEDURE — 80061 LIPID PANEL: CPT | Performed by: FAMILY MEDICINE

## 2022-05-12 PROCEDURE — 99396 PREV VISIT EST AGE 40-64: CPT | Performed by: FAMILY MEDICINE

## 2022-05-12 PROCEDURE — 83036 HEMOGLOBIN GLYCOSYLATED A1C: CPT | Performed by: FAMILY MEDICINE

## 2022-05-12 PROCEDURE — 81001 URINALYSIS AUTO W/SCOPE: CPT | Performed by: FAMILY MEDICINE

## 2022-05-12 PROCEDURE — 82607 VITAMIN B-12: CPT | Performed by: FAMILY MEDICINE

## 2022-05-12 PROCEDURE — 87086 URINE CULTURE/COLONY COUNT: CPT | Performed by: FAMILY MEDICINE

## 2022-05-12 PROCEDURE — 36415 COLL VENOUS BLD VENIPUNCTURE: CPT

## 2022-05-12 PROCEDURE — 80050 GENERAL HEALTH PANEL: CPT | Performed by: FAMILY MEDICINE

## 2022-05-12 RX ORDER — ALPRAZOLAM 0.25 MG/1
0.25 TABLET ORAL EVERY 8 HOURS PRN
Qty: 30 TABLET | Refills: 0 | Status: SHIPPED | OUTPATIENT
Start: 2022-05-12

## 2022-05-12 NOTE — PROGRESS NOTES
"Chief Complaint  Annual Exam and Gynecologic Exam    Subjective     {Problem List  Visit Diagnosis   Encounters  Notes  Medications  Labs  Result Review Imaging  Media :23}     Shanon Duvall presents to [facility]  For annual exam and pap smear.    Gynecologic Exam        Objective   Vital Signs:  /80 (BP Location: Left arm)   Pulse 89   Temp 97.9 °F (36.6 °C) (Infrared)   Resp 16   Ht 154.9 cm (61\")   Wt 67.6 kg (149 lb)   SpO2 98%   BMI 28.15 kg/m²     {BMI is >= 25 and < 30. (Overweight) The following options were offered after discussion (Optional):[id number]}      Physical Exam   Result Review :{Labs  Result Review  Imaging  Med Tab  Media  Procedures :23}   {The following data was reviewed by (Optional):13917}  {Ambulatory Labs (Optional):45683}  {Data reviewed (Optional):31061:::1}          Assessment and Plan {CC Problem List  Visit Diagnosis   ROS  Review (Popup)  Health Maintenance  Quality  BestPractice  Medications  SmartSets  SnapShot Encounters  Media :23}   There are no diagnoses linked to this encounter.       {Time Spent (Optional):50324}  Follow Up {Instructions Charge Capture  Follow-up Communications :23}  No follow-ups on file.  Patient was given instructions and counseling regarding her condition or for health maintenance advice. Please see specific information pulled into the AVS if appropriate.       "

## 2022-05-12 NOTE — PROGRESS NOTES
"Chief Complaint  Annual Exam and Gynecologic Exam    Subjective          Shanon Duvall presents to [facility]  History of Present Illness    Shanon is a 61-year-old female who presents today for a follow-up.    The patient states that she is doing well. She notes that she is on vacation this week and working on stripping her deck.    She expresses concern that she has have gone into atrial fibrillation. The patient notes that she feels her heart racing, but she denies any chest pain.  She does not check her heart rate at home, but notes taking aspirin 325 mg daily.    The patient states that she is up to date on her mammograms. Her implants have been in for a while and notes them being .     She notes that she is getting close to needing another colonoscopy. She states that she used to see Dr. Clemente, but she would like to see someone else this time.    She reports some soreness on the side of her back, but notes that she has been scrubbing the deck and that could have caused it. She describes the pain as being \"achy\" feeling and was concerned it was her kidneys. The patient has not had any pain, foul odor, or discoloration with urination. If she moves a certain way, she can reproduce the pain. She states that she has tried Advil 800 mg and it does help.    The patient states that she is sleeping well, and her energy is pretty decent. She reports that she likes her naps. The patient leads a full life, so she is tired at the end of the day.    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    She notes still taking a multivitamin, losartan with hydrochlorothiazide, fluoxetine 80 mg, and vitamin D. She is no longer using the erythromycin ophthalmic ointment for her eyes, or taking the calcium 600 mg. The patient states that she does not take her Xanax very often but would not mind having a refill.    Objective   Vital Signs:  /80 (BP Location: " "Left arm)   Pulse 89   Temp 97.9 °F (36.6 °C) (Infrared)   Resp 16   Ht 154.9 cm (61\")   Wt 67.6 kg (149 lb)   SpO2 98%   BMI 28.15 kg/m²     BMI is >= 25 and < 30. (Overweight) The following options were offered after discussion: weight loss educational material (shared in after visit summary), exercise counseling/recommendations and nutrition counseling/recommendations      Physical Exam  Constitutional:       Appearance: Normal appearance. She is well-developed and normal weight.   HENT:      Head: Normocephalic and atraumatic.      Right Ear: Tympanic membrane, ear canal and external ear normal.      Left Ear: Tympanic membrane, ear canal and external ear normal.      Nose: Nose normal.      Mouth/Throat:      Mouth: Mucous membranes are moist.      Pharynx: Oropharynx is clear. No oropharyngeal exudate.   Eyes:      Extraocular Movements: Extraocular movements intact.      Conjunctiva/sclera: Conjunctivae normal.      Pupils: Pupils are equal, round, and reactive to light.   Cardiovascular:      Rate and Rhythm: Normal rate and regular rhythm.      Pulses: Normal pulses.      Heart sounds: Normal heart sounds.   Pulmonary:      Effort: Pulmonary effort is normal.      Breath sounds: Normal breath sounds.   Abdominal:      General: Bowel sounds are normal.      Palpations: Abdomen is soft.   Genitourinary:     General: Normal vulva.      Comments: External genitalia normal.  Vaginal vault and mucosa normal.  Cervix appears healthy and Pap smear taken with brush.  Some bleeding induced.  Bimanual exam reveals normal uterus and adnexa with no masses.  Musculoskeletal:         General: Normal range of motion.      Cervical back: Normal range of motion and neck supple.   Skin:     General: Skin is warm and dry.   Neurological:      General: No focal deficit present.      Mental Status: She is alert and oriented to person, place, and time. Mental status is at baseline.   Psychiatric:         Mood and Affect: " Mood normal.         Behavior: Behavior normal.         Thought Content: Thought content normal.         Judgment: Judgment normal.        Result Review :                 Assessment and Plan    Diagnoses and all orders for this visit:  Problem List Items Addressed This Visit        Cardiac and Vasculature    Hypertension    Current Assessment & Plan     Patient's blood pressure is excellent.  Continue current medications           Atherosclerosis of native coronary artery of native heart without angina pectoris    Current Assessment & Plan     Previous coronary artery disease but no progression.              Endocrine and Metabolic    Vitamin D deficiency    Current Assessment & Plan     Recheck value and adjust treatment as needed           Relevant Orders    Vitamin D 25 Hydroxy (Completed)       Genitourinary and Reproductive     Screening for cervical cancer    Current Assessment & Plan     Pap smear taken and will follow-up accordingly           Relevant Orders    IGP,Aptima HPV,Age Gdln       Health Encounters    Preventative health care - Primary    Current Assessment & Plan     Patient is 61 years old and doing well.  She is up-to-date with her colonoscopy.  She is up-to-date with mammograms and DEXA scans.  Lab tests will be done.  Patient is taking good care of her self and keeping up with care exams.           Relevant Orders    Urinalysis With Culture If Indicated - Urine, Clean Catch (Completed)    CBC & Differential (Completed)    Comprehensive Metabolic Panel (Completed)    Hemoglobin A1c (Completed)    TSH (Completed)    Lipid Panel (Completed)    Vitamin B12 (Completed)       Mental Health    Alcoholism (HCC)    Current Assessment & Plan     She has been in sobriety now over 8 years.  Doing great             Other Visit Diagnoses     Mild anxiety        Relevant Medications    ALPRAZolam (Xanax) 0.25 MG tablet               Follow Up   Return in about 1 year (around 5/12/2023) for Annual  physical.  Patient was given instructions and counseling regarding her condition or for health maintenance advice. Please see specific information pulled into the AVS if appropriate.     Transcribed from ambient dictation for Shay Espinoza MD by Petey Cortez.  05/12/22   17:03 EDT    Patient verbalized consent to the visit recording.  I have personally performed the services described in this document as transcribed by the above individual, and it is both accurate and complete.  Shay Espinoza MD  5/15/2022  20:00 EDT

## 2022-05-13 LAB
25(OH)D3 SERPL-MCNC: 54.6 NG/ML (ref 30–100)
ALBUMIN SERPL-MCNC: 4.6 G/DL (ref 3.5–5.2)
ALBUMIN/GLOB SERPL: 1.5 G/DL
ALP SERPL-CCNC: 99 U/L (ref 39–117)
ALT SERPL W P-5'-P-CCNC: 25 U/L (ref 1–33)
ANION GAP SERPL CALCULATED.3IONS-SCNC: 13.8 MMOL/L (ref 5–15)
AST SERPL-CCNC: 22 U/L (ref 1–32)
BILIRUB SERPL-MCNC: 0.4 MG/DL (ref 0–1.2)
BUN SERPL-MCNC: 19 MG/DL (ref 8–23)
BUN/CREAT SERPL: 24.1 (ref 7–25)
CALCIUM SPEC-SCNC: 9.7 MG/DL (ref 8.6–10.5)
CHLORIDE SERPL-SCNC: 102 MMOL/L (ref 98–107)
CHOLEST SERPL-MCNC: 196 MG/DL (ref 0–200)
CO2 SERPL-SCNC: 25.2 MMOL/L (ref 22–29)
CREAT SERPL-MCNC: 0.79 MG/DL (ref 0.57–1)
EGFRCR SERPLBLD CKD-EPI 2021: 84.2 ML/MIN/1.73
GLOBULIN UR ELPH-MCNC: 3 GM/DL
GLUCOSE SERPL-MCNC: 87 MG/DL (ref 65–99)
HBA1C MFR BLD: 5.1 % (ref 3.5–5.6)
HDLC SERPL-MCNC: 36 MG/DL (ref 40–60)
LDLC SERPL CALC-MCNC: 99 MG/DL (ref 0–100)
LDLC/HDLC SERPL: 2.44 {RATIO}
POTASSIUM SERPL-SCNC: 3.4 MMOL/L (ref 3.5–5.2)
PROT SERPL-MCNC: 7.6 G/DL (ref 6–8.5)
SODIUM SERPL-SCNC: 141 MMOL/L (ref 136–145)
TRIGL SERPL-MCNC: 360 MG/DL (ref 0–150)
TSH SERPL DL<=0.05 MIU/L-ACNC: 0.8 UIU/ML (ref 0.27–4.2)
VIT B12 BLD-MCNC: 1310 PG/ML (ref 211–946)
VLDLC SERPL-MCNC: 61 MG/DL (ref 5–40)

## 2022-05-14 LAB — BACTERIA SPEC AEROBE CULT: NORMAL

## 2022-05-15 NOTE — ASSESSMENT & PLAN NOTE
Patient is 61 years old and doing well.  She is up-to-date with her colonoscopy.  She is up-to-date with mammograms and DEXA scans.  Lab tests will be done.  Patient is taking good care of her self and keeping up with care exams.

## 2022-05-17 RX ORDER — B-COMPLEX WITH VITAMIN C
1 TABLET ORAL DAILY
COMMUNITY

## 2022-05-17 RX ORDER — FLUOXETINE HYDROCHLORIDE 40 MG/1
1 CAPSULE ORAL
COMMUNITY
End: 2022-06-27 | Stop reason: SDUPTHER

## 2022-05-17 RX ORDER — ASPIRIN 325 MG
1 TABLET ORAL DAILY
COMMUNITY

## 2022-05-17 RX ORDER — PANTOPRAZOLE SODIUM 40 MG/1
1 TABLET, DELAYED RELEASE ORAL DAILY
COMMUNITY

## 2022-05-17 RX ORDER — LOSARTAN POTASSIUM 50 MG/1
1 TABLET ORAL DAILY
COMMUNITY
End: 2022-06-22 | Stop reason: ALTCHOICE

## 2022-05-18 LAB
AGE GDLN ACOG TESTING: NORMAL
CYTOLOGIST CVX/VAG CYTO: NORMAL
CYTOLOGY CVX/VAG DOC CYTO: NORMAL
CYTOLOGY CVX/VAG DOC THIN PREP: NORMAL
DX ICD CODE: NORMAL
HIV 1 & 2 AB SER-IMP: NORMAL
HPV I/H RISK 4 DNA CVX QL PROBE+SIG AMP: NEGATIVE
OTHER STN SPEC: NORMAL
STAT OF ADQ CVX/VAG CYTO-IMP: NORMAL

## 2022-06-22 RX ORDER — LOSARTAN POTASSIUM AND HYDROCHLOROTHIAZIDE 25; 100 MG/1; MG/1
TABLET ORAL
Qty: 90 TABLET | Refills: 3 | Status: SHIPPED | OUTPATIENT
Start: 2022-06-22

## 2022-06-27 RX ORDER — FLUOXETINE HYDROCHLORIDE 40 MG/1
80 CAPSULE ORAL DAILY
Qty: 180 CAPSULE | Refills: 1 | Status: SHIPPED | OUTPATIENT
Start: 2022-06-27 | End: 2023-02-14 | Stop reason: SDUPTHER

## 2022-09-21 ENCOUNTER — TRANSCRIBE ORDERS (OUTPATIENT)
Dept: ADMINISTRATIVE | Facility: HOSPITAL | Age: 62
End: 2022-09-21

## 2022-09-21 DIAGNOSIS — H91.90 HEARING LOSS, UNSPECIFIED HEARING LOSS TYPE, UNSPECIFIED LATERALITY: Primary | ICD-10-CM

## 2022-10-13 ENCOUNTER — APPOINTMENT (OUTPATIENT)
Dept: MRI IMAGING | Facility: HOSPITAL | Age: 62
End: 2022-10-13

## 2023-01-04 DIAGNOSIS — I10 PRIMARY HYPERTENSION: ICD-10-CM

## 2023-01-04 DIAGNOSIS — K21.00 GASTROESOPHAGEAL REFLUX DISEASE WITH ESOPHAGITIS WITHOUT HEMORRHAGE: ICD-10-CM

## 2023-01-04 DIAGNOSIS — I25.10 ATHEROSCLEROSIS OF NATIVE CORONARY ARTERY OF NATIVE HEART WITHOUT ANGINA PECTORIS: ICD-10-CM

## 2023-01-04 DIAGNOSIS — Z00.00 PREVENTATIVE HEALTH CARE: Primary | ICD-10-CM

## 2023-01-11 ENCOUNTER — TELEPHONE (OUTPATIENT)
Dept: FAMILY MEDICINE CLINIC | Facility: CLINIC | Age: 63
End: 2023-01-11
Payer: COMMERCIAL

## 2023-01-11 NOTE — TELEPHONE ENCOUNTER
Spoke with patient and scheduled a [facility] lab appt on 1/18/2023 at 8:50am.

## 2023-01-18 ENCOUNTER — LAB (OUTPATIENT)
Dept: FAMILY MEDICINE CLINIC | Facility: CLINIC | Age: 63
End: 2023-01-18
Payer: COMMERCIAL

## 2023-01-18 LAB
ALBUMIN SERPL-MCNC: 4.5 G/DL (ref 3.5–5.2)
ALBUMIN/GLOB SERPL: 1.8 G/DL
ALP SERPL-CCNC: 86 U/L (ref 39–117)
ALT SERPL W P-5'-P-CCNC: 34 U/L (ref 1–33)
ANION GAP SERPL CALCULATED.3IONS-SCNC: 8 MMOL/L (ref 5–15)
AST SERPL-CCNC: 33 U/L (ref 1–32)
BACTERIA UR QL AUTO: ABNORMAL /HPF
BASOPHILS # BLD AUTO: 0.1 10*3/MM3 (ref 0–0.2)
BASOPHILS NFR BLD AUTO: 1.4 % (ref 0–1.5)
BILIRUB SERPL-MCNC: 0.6 MG/DL (ref 0–1.2)
BILIRUB UR QL STRIP: NEGATIVE
BUN SERPL-MCNC: 18 MG/DL (ref 8–23)
BUN/CREAT SERPL: 23.4 (ref 7–25)
CALCIUM SPEC-SCNC: 9.6 MG/DL (ref 8.6–10.5)
CHLORIDE SERPL-SCNC: 102 MMOL/L (ref 98–107)
CHOLEST SERPL-MCNC: 234 MG/DL (ref 0–200)
CLARITY UR: CLEAR
CO2 SERPL-SCNC: 30 MMOL/L (ref 22–29)
COLOR UR: YELLOW
CREAT SERPL-MCNC: 0.77 MG/DL (ref 0.57–1)
DEPRECATED RDW RBC AUTO: 41.9 FL (ref 37–54)
EGFRCR SERPLBLD CKD-EPI 2021: 86.3 ML/MIN/1.73
EOSINOPHIL # BLD AUTO: 0.38 10*3/MM3 (ref 0–0.4)
EOSINOPHIL NFR BLD AUTO: 5.3 % (ref 0.3–6.2)
ERYTHROCYTE [DISTWIDTH] IN BLOOD BY AUTOMATED COUNT: 12.1 % (ref 12.3–15.4)
ERYTHROCYTE [SEDIMENTATION RATE] IN BLOOD: 7 MM/HR (ref 0–30)
GLOBULIN UR ELPH-MCNC: 2.5 GM/DL
GLUCOSE SERPL-MCNC: 91 MG/DL (ref 65–99)
GLUCOSE UR STRIP-MCNC: NEGATIVE MG/DL
HBA1C MFR BLD: 5 % (ref 3.5–5.6)
HCT VFR BLD AUTO: 47 % (ref 34–46.6)
HDLC SERPL-MCNC: 52 MG/DL (ref 40–60)
HGB BLD-MCNC: 15.6 G/DL (ref 12–15.9)
HGB UR QL STRIP.AUTO: ABNORMAL
HYALINE CASTS UR QL AUTO: ABNORMAL /LPF
IMM GRANULOCYTES # BLD AUTO: 0.04 10*3/MM3 (ref 0–0.05)
IMM GRANULOCYTES NFR BLD AUTO: 0.6 % (ref 0–0.5)
KETONES UR QL STRIP: NEGATIVE
LDLC SERPL CALC-MCNC: 159 MG/DL (ref 0–100)
LDLC/HDLC SERPL: 3.02 {RATIO}
LEUKOCYTE ESTERASE UR QL STRIP.AUTO: ABNORMAL
LYMPHOCYTES # BLD AUTO: 1.41 10*3/MM3 (ref 0.7–3.1)
LYMPHOCYTES NFR BLD AUTO: 19.7 % (ref 19.6–45.3)
MCH RBC QN AUTO: 31.1 PG (ref 26.6–33)
MCHC RBC AUTO-ENTMCNC: 33.2 G/DL (ref 31.5–35.7)
MCV RBC AUTO: 93.6 FL (ref 79–97)
MONOCYTES # BLD AUTO: 0.48 10*3/MM3 (ref 0.1–0.9)
MONOCYTES NFR BLD AUTO: 6.7 % (ref 5–12)
NEUTROPHILS NFR BLD AUTO: 4.74 10*3/MM3 (ref 1.7–7)
NEUTROPHILS NFR BLD AUTO: 66.3 % (ref 42.7–76)
NITRITE UR QL STRIP: NEGATIVE
NRBC BLD AUTO-RTO: 0 /100 WBC (ref 0–0.2)
PH UR STRIP.AUTO: 7 [PH] (ref 5–8)
PLATELET # BLD AUTO: 237 10*3/MM3 (ref 140–450)
PMV BLD AUTO: 10 FL (ref 6–12)
POTASSIUM SERPL-SCNC: 4.1 MMOL/L (ref 3.5–5.2)
PROT SERPL-MCNC: 7 G/DL (ref 6–8.5)
PROT UR QL STRIP: NEGATIVE
RBC # BLD AUTO: 5.02 10*6/MM3 (ref 3.77–5.28)
RBC # UR STRIP: ABNORMAL /HPF
REF LAB TEST METHOD: ABNORMAL
SODIUM SERPL-SCNC: 140 MMOL/L (ref 136–145)
SP GR UR STRIP: 1.02 (ref 1–1.03)
SQUAMOUS #/AREA URNS HPF: ABNORMAL /HPF
T4 FREE SERPL-MCNC: 0.93 NG/DL (ref 0.93–1.7)
TRIGL SERPL-MCNC: 126 MG/DL (ref 0–150)
TSH SERPL DL<=0.05 MIU/L-ACNC: 0.79 UIU/ML (ref 0.27–4.2)
UROBILINOGEN UR QL STRIP: ABNORMAL
VIT B12 BLD-MCNC: 1010 PG/ML (ref 211–946)
VLDLC SERPL-MCNC: 23 MG/DL (ref 5–40)
WBC # UR STRIP: ABNORMAL /HPF
WBC NRBC COR # BLD: 7.15 10*3/MM3 (ref 3.4–10.8)

## 2023-01-18 PROCEDURE — 85025 COMPLETE CBC W/AUTO DIFF WBC: CPT | Performed by: FAMILY MEDICINE

## 2023-01-18 PROCEDURE — 84439 ASSAY OF FREE THYROXINE: CPT | Performed by: FAMILY MEDICINE

## 2023-01-18 PROCEDURE — 84443 ASSAY THYROID STIM HORMONE: CPT | Performed by: FAMILY MEDICINE

## 2023-01-18 PROCEDURE — 87186 SC STD MICRODIL/AGAR DIL: CPT | Performed by: FAMILY MEDICINE

## 2023-01-18 PROCEDURE — 85652 RBC SED RATE AUTOMATED: CPT | Performed by: FAMILY MEDICINE

## 2023-01-18 PROCEDURE — 36415 COLL VENOUS BLD VENIPUNCTURE: CPT | Performed by: FAMILY MEDICINE

## 2023-01-18 PROCEDURE — 80061 LIPID PANEL: CPT | Performed by: FAMILY MEDICINE

## 2023-01-18 PROCEDURE — 81001 URINALYSIS AUTO W/SCOPE: CPT | Performed by: FAMILY MEDICINE

## 2023-01-18 PROCEDURE — 83036 HEMOGLOBIN GLYCOSYLATED A1C: CPT | Performed by: FAMILY MEDICINE

## 2023-01-18 PROCEDURE — 87086 URINE CULTURE/COLONY COUNT: CPT | Performed by: FAMILY MEDICINE

## 2023-01-18 PROCEDURE — 87077 CULTURE AEROBIC IDENTIFY: CPT | Performed by: FAMILY MEDICINE

## 2023-01-18 PROCEDURE — 80053 COMPREHEN METABOLIC PANEL: CPT | Performed by: FAMILY MEDICINE

## 2023-01-18 PROCEDURE — 82607 VITAMIN B-12: CPT | Performed by: FAMILY MEDICINE

## 2023-01-19 ENCOUNTER — TELEPHONE (OUTPATIENT)
Dept: FAMILY MEDICINE CLINIC | Facility: CLINIC | Age: 63
End: 2023-01-19
Payer: COMMERCIAL

## 2023-01-19 NOTE — PROGRESS NOTES
Ask Shanon about her experience with statins.  Has she been unable to tolerate them in the past.?  We will need to discuss this more and find out a way to improve her lipid panel

## 2023-01-19 NOTE — TELEPHONE ENCOUNTER
----- Message from Shay Espinoza MD sent at 1/18/2023  7:17 PM EST -----  I need to see her in the next 2 to 3 months      This will be an OV for cholesterol, not physical since she is not due until after May 12,2023

## 2023-01-20 LAB
BACTERIA SPEC AEROBE CULT: ABNORMAL
BACTERIA SPEC AEROBE CULT: ABNORMAL

## 2023-02-14 RX ORDER — FLUOXETINE HYDROCHLORIDE 40 MG/1
80 CAPSULE ORAL DAILY
Qty: 180 CAPSULE | Refills: 1 | Status: CANCELLED | OUTPATIENT
Start: 2023-02-14

## 2023-02-14 RX ORDER — FLUOXETINE HYDROCHLORIDE 40 MG/1
80 CAPSULE ORAL DAILY
Qty: 180 CAPSULE | Refills: 1 | Status: SHIPPED | OUTPATIENT
Start: 2023-02-14

## 2023-03-08 ENCOUNTER — LAB (OUTPATIENT)
Dept: LAB | Facility: HOSPITAL | Age: 63
End: 2023-03-08
Payer: COMMERCIAL

## 2023-03-08 DIAGNOSIS — I10 PRIMARY HYPERTENSION: ICD-10-CM

## 2023-03-08 DIAGNOSIS — R10.11 RIGHT UPPER QUADRANT ABDOMINAL PAIN: Primary | ICD-10-CM

## 2023-03-08 DIAGNOSIS — R10.13 EPIGASTRIC PAIN: ICD-10-CM

## 2023-03-08 DIAGNOSIS — R10.31 RIGHT LOWER QUADRANT ABDOMINAL PAIN: ICD-10-CM

## 2023-03-08 DIAGNOSIS — R10.11 RIGHT UPPER QUADRANT ABDOMINAL PAIN: ICD-10-CM

## 2023-03-08 DIAGNOSIS — K21.00 GASTROESOPHAGEAL REFLUX DISEASE WITH ESOPHAGITIS WITHOUT HEMORRHAGE: ICD-10-CM

## 2023-03-08 LAB
AMYLASE SERPL-CCNC: 67 U/L (ref 28–100)
BASOPHILS # BLD AUTO: 0.09 10*3/MM3 (ref 0–0.2)
BASOPHILS NFR BLD AUTO: 1.1 % (ref 0–1.5)
BILIRUB UR QL STRIP: NEGATIVE
CLARITY UR: CLEAR
COLOR UR: YELLOW
CRP SERPL-MCNC: 0.4 MG/DL (ref 0–0.5)
DEPRECATED RDW RBC AUTO: 41.7 FL (ref 37–54)
EOSINOPHIL # BLD AUTO: 0.43 10*3/MM3 (ref 0–0.4)
EOSINOPHIL NFR BLD AUTO: 5.2 % (ref 0.3–6.2)
ERYTHROCYTE [DISTWIDTH] IN BLOOD BY AUTOMATED COUNT: 12.4 % (ref 12.3–15.4)
ERYTHROCYTE [SEDIMENTATION RATE] IN BLOOD: 9 MM/HR (ref 0–30)
GLUCOSE UR STRIP-MCNC: NEGATIVE MG/DL
HCT VFR BLD AUTO: 44.6 % (ref 34–46.6)
HGB BLD-MCNC: 15.1 G/DL (ref 12–15.9)
HGB UR QL STRIP.AUTO: NEGATIVE
IMM GRANULOCYTES # BLD AUTO: 0.05 10*3/MM3 (ref 0–0.05)
IMM GRANULOCYTES NFR BLD AUTO: 0.6 % (ref 0–0.5)
KETONES UR QL STRIP: NEGATIVE
LEUKOCYTE ESTERASE UR QL STRIP.AUTO: ABNORMAL
LIPASE SERPL-CCNC: 36 U/L (ref 13–60)
LYMPHOCYTES # BLD AUTO: 2.67 10*3/MM3 (ref 0.7–3.1)
LYMPHOCYTES NFR BLD AUTO: 32.1 % (ref 19.6–45.3)
MCH RBC QN AUTO: 31.1 PG (ref 26.6–33)
MCHC RBC AUTO-ENTMCNC: 33.9 G/DL (ref 31.5–35.7)
MCV RBC AUTO: 92 FL (ref 79–97)
MONOCYTES # BLD AUTO: 0.49 10*3/MM3 (ref 0.1–0.9)
MONOCYTES NFR BLD AUTO: 5.9 % (ref 5–12)
NEUTROPHILS NFR BLD AUTO: 4.59 10*3/MM3 (ref 1.7–7)
NEUTROPHILS NFR BLD AUTO: 55.1 % (ref 42.7–76)
NITRITE UR QL STRIP: NEGATIVE
NRBC BLD AUTO-RTO: 0 /100 WBC (ref 0–0.2)
PH UR STRIP.AUTO: 6 [PH] (ref 5–8)
PLATELET # BLD AUTO: 281 10*3/MM3 (ref 140–450)
PMV BLD AUTO: 10.2 FL (ref 6–12)
PROT UR QL STRIP: NEGATIVE
RBC # BLD AUTO: 4.85 10*6/MM3 (ref 3.77–5.28)
SP GR UR STRIP: 1.02 (ref 1–1.03)
UROBILINOGEN UR QL STRIP: ABNORMAL
WBC NRBC COR # BLD: 8.32 10*3/MM3 (ref 3.4–10.8)

## 2023-03-08 PROCEDURE — 36415 COLL VENOUS BLD VENIPUNCTURE: CPT

## 2023-03-08 PROCEDURE — 85652 RBC SED RATE AUTOMATED: CPT

## 2023-03-08 PROCEDURE — 83690 ASSAY OF LIPASE: CPT

## 2023-03-08 PROCEDURE — 82150 ASSAY OF AMYLASE: CPT

## 2023-03-08 PROCEDURE — 81001 URINALYSIS AUTO W/SCOPE: CPT

## 2023-03-08 PROCEDURE — 85025 COMPLETE CBC W/AUTO DIFF WBC: CPT

## 2023-03-08 PROCEDURE — 86140 C-REACTIVE PROTEIN: CPT

## 2023-03-08 PROCEDURE — 87086 URINE CULTURE/COLONY COUNT: CPT

## 2023-03-09 LAB
BACTERIA UR QL AUTO: ABNORMAL /HPF
HYALINE CASTS UR QL AUTO: ABNORMAL /LPF
RBC # UR STRIP: ABNORMAL /HPF
REF LAB TEST METHOD: ABNORMAL
SQUAMOUS #/AREA URNS HPF: ABNORMAL /HPF
WBC # UR STRIP: ABNORMAL /HPF

## 2023-03-10 LAB — BACTERIA SPEC AEROBE CULT: NO GROWTH

## 2023-03-22 RX ORDER — AZITHROMYCIN 500 MG/1
500 TABLET, FILM COATED ORAL DAILY
Qty: 5 TABLET | Refills: 0 | Status: SHIPPED | OUTPATIENT
Start: 2023-03-22 | End: 2023-03-22 | Stop reason: SDUPTHER

## 2023-03-22 RX ORDER — BROMPHENIRAMINE MALEATE, PSEUDOEPHEDRINE HYDROCHLORIDE, AND DEXTROMETHORPHAN HYDROBROMIDE 2; 30; 10 MG/5ML; MG/5ML; MG/5ML
5-10 SYRUP ORAL 4 TIMES DAILY PRN
Qty: 240 ML | Refills: 1 | Status: SHIPPED | OUTPATIENT
Start: 2023-03-22 | End: 2023-03-22 | Stop reason: SDUPTHER

## 2023-03-22 RX ORDER — BROMPHENIRAMINE MALEATE, PSEUDOEPHEDRINE HYDROCHLORIDE, AND DEXTROMETHORPHAN HYDROBROMIDE 2; 30; 10 MG/5ML; MG/5ML; MG/5ML
5-10 SYRUP ORAL 4 TIMES DAILY PRN
Qty: 240 ML | Refills: 1 | Status: SHIPPED | OUTPATIENT
Start: 2023-03-22 | End: 2023-04-06

## 2023-03-22 RX ORDER — AZITHROMYCIN 500 MG/1
500 TABLET, FILM COATED ORAL DAILY
Qty: 5 TABLET | Refills: 0 | Status: SHIPPED | OUTPATIENT
Start: 2023-03-22 | End: 2023-03-28

## 2023-04-19 ENCOUNTER — OFFICE VISIT (OUTPATIENT)
Dept: FAMILY MEDICINE CLINIC | Facility: CLINIC | Age: 63
End: 2023-04-19
Payer: COMMERCIAL

## 2023-04-19 VITALS
SYSTOLIC BLOOD PRESSURE: 106 MMHG | OXYGEN SATURATION: 96 % | RESPIRATION RATE: 16 BRPM | HEIGHT: 61 IN | DIASTOLIC BLOOD PRESSURE: 68 MMHG | BODY MASS INDEX: 28.92 KG/M2 | WEIGHT: 153.2 LBS | HEART RATE: 88 BPM | TEMPERATURE: 98.4 F

## 2023-04-19 DIAGNOSIS — R07.9 CHEST PAIN, UNSPECIFIED TYPE: Primary | ICD-10-CM

## 2023-04-19 PROCEDURE — 99213 OFFICE O/P EST LOW 20 MIN: CPT | Performed by: FAMILY MEDICINE

## 2023-04-19 NOTE — PROGRESS NOTES
Chief Complaint  Follow-up    Subjective      Shanon Duvall presents to [facility]  History of Present Illness  To discuss treatment for HLPD.  Hyperlipidemia    The patient presents today for a follow-up.    The patient states that her cholesterol has been elevated in her last laboratory tests from 01/2023. She is unsure if she wants to take a statin. She states that she is not doing anything special with her diet. She states that she has had 2 laboratory tests for insurance companies to get life insurance and her results were normal except for 1 that may have been an indicator for cardiac stress. The patient notes her vitamin B12 levels are normal because she drinks [employer] energy drinks that contain vitamin B12.     The patient states that she has not had a cardiac work-up or stress test in a long time. She notes she has been having intermittent episodes of chest pressure with intermittent shortness of breath. The patient denies sweating or nausea associated. She is unable to recreate the pressure and states her symptoms are not affected by movement.  She states that she notices it with physical activity. The patient states that she has noticed a dull ache in her clavicle that began today. She states that it does not feel like muscle pain and it is not tender to palpation. The patient notes she recently spent 1 week taking care of her grandchild with down syndrome and may have mildly injured the area, but she is unsure.    The patient states that she walks a lot at work, about 5 miles daily. She plans to exercise more but notes she has lost about 10 pounds so far over the last few months. The patient is up to date on her mammograms. She states she has been having issues with her hearing and has gone through 2 sets of hearing aids and she complains of constant tinnitus in her left ear. The patient has been evaluated by ENT. She also notes she has been having some issues with her  "short-term memory, like forgetting peoples' names easily.    Objective   Vital Signs:  /68   Pulse 88   Temp 98.4 °F (36.9 °C) (Infrared)   Resp 16   Ht 154.9 cm (60.98\")   Wt 69.5 kg (153 lb 3.2 oz)   SpO2 96%   BMI 28.96 kg/m²   Estimated body mass index is 28.96 kg/m² as calculated from the following:    Height as of this encounter: 154.9 cm (60.98\").    Weight as of this encounter: 69.5 kg (153 lb 3.2 oz).             Physical Exam  Constitutional:       Appearance: Normal appearance. She is well-developed and normal weight.   HENT:      Head: Normocephalic and atraumatic.      Right Ear: Tympanic membrane, ear canal and external ear normal.      Left Ear: Tympanic membrane, ear canal and external ear normal.      Nose: Nose normal.      Mouth/Throat:      Mouth: Mucous membranes are moist.      Pharynx: Oropharynx is clear. No oropharyngeal exudate.   Eyes:      Extraocular Movements: Extraocular movements intact.      Conjunctiva/sclera: Conjunctivae normal.      Pupils: Pupils are equal, round, and reactive to light.   Cardiovascular:      Rate and Rhythm: Normal rate and regular rhythm.      Pulses: Normal pulses.      Heart sounds: Normal heart sounds.   Pulmonary:      Effort: Pulmonary effort is normal.      Breath sounds: Normal breath sounds.   Abdominal:      General: Bowel sounds are normal.      Palpations: Abdomen is soft.   Musculoskeletal:         General: Normal range of motion.      Cervical back: Normal range of motion and neck supple.   Skin:     General: Skin is warm and dry.   Neurological:      General: No focal deficit present.      Mental Status: She is alert and oriented to person, place, and time. Mental status is at baseline.   Psychiatric:         Mood and Affect: Mood normal.         Behavior: Behavior normal.         Thought Content: Thought content normal.         Judgment: Judgment normal.          Assessment and Plan     1. Chest pain  - Shanon is a 62-year-old white " female who has come in today to follow up on some blood work that she had last month, but also to discuss some chest pain she has been having. She has been experiencing some chest pain just to the left of her upper sternum, particularly noted with physical exertion. 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 The patient is doing well with it other than it causes some shortness of breath at times. She does not sweat and she does not really feel palpitations from it. On physical exam today, her heart is regular in its rhythm and rate. I do not hear any murmurs. Her carotids are open with good pulses. Her chest is clear to auscultation. We are going to send her to a cardiologist and get their opinion. We will schedule her for a stress Myoview study and we will see if Dr. Chin, new cardiologist, could see her in the very near future. Hopefully, we will get this evaluation done quickly and efficiently and she is going to be okay.         Follow Up Return in about 6 weeks (around 5/31/2023), or if symptoms worsen or fail to improve, for Recheck.  Patient was given instructions and counseling regarding her condition or for health maintenance advice. Please see specific information pulled into the AVS if appropriate.     Transcribed from ambient dictation for Shay Espinoza MD by Anita Christina.  04/20/23   09:26 EDT    Patient or patient representative verbalized consent to the visit recording.  I have personally performed the services described in this document as transcribed by the above individual, and it is both accurate and complete.  Shay Espinoza MD  4/25/2023  21:19 EDT

## 2023-07-20 ENCOUNTER — CONSULT (OUTPATIENT)
Dept: CARDIOLOGY | Facility: CLINIC | Age: 63
End: 2023-07-20
Payer: COMMERCIAL

## 2023-07-20 VITALS
DIASTOLIC BLOOD PRESSURE: 80 MMHG | SYSTOLIC BLOOD PRESSURE: 120 MMHG | WEIGHT: 153 LBS | HEART RATE: 72 BPM | OXYGEN SATURATION: 97 % | HEIGHT: 60 IN | BODY MASS INDEX: 30.04 KG/M2

## 2023-07-20 DIAGNOSIS — R07.9 CHEST PAIN, UNSPECIFIED TYPE: ICD-10-CM

## 2023-07-20 DIAGNOSIS — I10 PRIMARY HYPERTENSION: Primary | ICD-10-CM

## 2023-07-20 DIAGNOSIS — R06.09 DYSPNEA ON EXERTION: ICD-10-CM

## 2023-07-20 PROCEDURE — 99204 OFFICE O/P NEW MOD 45 MIN: CPT | Performed by: INTERNAL MEDICINE

## 2023-07-20 PROCEDURE — 93000 ELECTROCARDIOGRAM COMPLETE: CPT | Performed by: INTERNAL MEDICINE

## 2023-07-20 NOTE — PROGRESS NOTES
Cardiology Office Consultation      Encounter Date:  2023    PATIENT IDENTIFICATION    Name: Shanon Duvall  Age: 62 y.o. Sex: female : 1960  MRN: [id number]    Reason For Consultation:  Chest pain    History of Present Illness:  Dear Dr. Espinoza, Shay THOMASON MD    I had the pleasure of seeing Shanon Duvall today. As you are well aware, this is a 62 y.o. female with no established history of ischemic heart disease.  She does have a history of anxiety, hypertension, and acid reflux.  She presents today for the evaluation of chest pain.    She is reporting some intermittent chest discomfort.  She notes its been going on for several months.  More recently it has become more frequent.  She describes it as an ache that is moderate in intensity.  There is some occasional radiation into her left arm and neck.  It can occur 3-4 times a day.  She will have some shortness of breath with exertion.  It will last a minute or 2 and dissipates without intervention.    She does note that activities of daily living do not cause much of an issue unless there is an incline involved.  She will get short of breath with this.  She is also reporting some palpitations.  These are occasional and will last for a few minutes and dissipate on their own.    She is a smoker.  She has smoked for about 40 years.  She estimates that she smoked about a half a pack a day during that time when averaged out.    She does have a family history of ischemic heart disease.  Her father was diagnosed with coronary artery disease in in his 60s.  Her sister is 71 and recently had a circumflex stent placed.  Her brother just had a calcium score that was elevated.    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    She has undergone ischemic work-up in the past.  She notes that about 15 years ago she had a heart catheterization.  She recalls being told that she had a minor blockage that was not severe enough for a stent.  She  "is additionally had a couple of treadmill stress test but the last 1 of those was at least 10 years ago.    Assessment & Plan    Impressions:  Chest pain with typical and atypical features  Dyspnea on exertion  Strong family history of coronary artery disease  Hypertension  Tobacco abuse  Acid reflux    Recommendations:  2D echocardiogram  Nuclear stress test  Lipid panel  Follow-up 6 weeks      Diagnoses and all orders for this visit:    1. Primary hypertension (Primary)  -     Stress Test With Myocardial Perfusion One Day; Future  -     Adult Transthoracic Echo Complete W/ Cont if Necessary Per Protocol; Future  -     Lipid Panel; Future  -     ECG 12 Lead    2. Chest pain, unspecified type  -     Stress Test With Myocardial Perfusion One Day; Future  -     Adult Transthoracic Echo Complete W/ Cont if Necessary Per Protocol; Future  -     Lipid Panel; Future  -     ECG 12 Lead    3. Dyspnea on exertion  -     Stress Test With Myocardial Perfusion One Day; Future  -     Adult Transthoracic Echo Complete W/ Cont if Necessary Per Protocol; Future  -     Lipid Panel; Future  -     ECG 12 Lead         Objective:    Vitals:  Vitals:    07/20/23 1259   BP: 120/80   BP Location: Left arm   Patient Position: Sitting   Cuff Size: Adult   Pulse: 72   SpO2: 97%   Weight: 69.4 kg (153 lb)   Height: 152.4 cm (60\")     Body mass index is 29.88 kg/m².    Physical Exam:    General: Alert, cooperative, no distress, appears stated age  Head:  Normocephalic, atraumatic, mucous membranes moist  Eyes:  Conjunctiva/corneas clear, EOM's intact     Neck:  Supple,  no bruit    Lungs: Coarse and diminished but otherwise clear to auscultation bilaterally  Chest wall: No tenderness  Heart::  Regular rate and rhythm, S1 and S2 normal, 1/6 holosystolic murmur.  No rub or gallop  Abdomen: Soft, non-tender, nondistended bowel sounds active  Extremities: No cyanosis, clubbing, or edema  Pulses: 2+ and symmetric all extremities  Skin:  No rashes or " lesions  Neuro/psych: A&O x3. CN II through XII are grossly intact with appropriate affect    History of Present Illness      Allergies:  No Known Allergies    Medication Review:     Current Outpatient Medications:     ALPRAZolam (Xanax) 0.25 MG tablet, Take 1 tablet by mouth Every 8 (Eight) Hours As Needed for Anxiety for up to 30 doses., Disp: 30 tablet, Rfl: 0    aspirin 325 MG tablet, Take 1 tablet by mouth Daily., Disp: , Rfl:     Cholecalciferol (VITAMIN D) 50 MCG (2000) tablet, Daily., Disp: , Rfl:     FLUoxetine (PROzac) 40 MG capsule, Take 2 capsules by mouth Daily., Disp: 180 capsule, Rfl: 1    losartan-hydrochlorothiazide (HYZAAR) 100-25 MG per tablet, Take 1 tablet by mouth Daily., Disp: 90 tablet, Rfl: 3    Multiple Vitamins-Minerals (ZINC PO), Take  by mouth., Disp: , Rfl:     pantoprazole (PROTONIX) 40 MG EC tablet, Take 1 tablet by mouth Daily., Disp: , Rfl:     Family History:  Family History   Problem Relation Age of Onset    Breast cancer Maternal Aunt         50's    Coronary artery disease Father     Heart attack Father          8 years ago, probable heart  80 years old    Hyperlipidemia Father     Hypertension Sister         90% circumflex artery stent placed ? Dr. Brady    Heart disease Sister     Hypertension Brother     Hyperlipidemia Brother     Parkinsonism Half-Brother        Past Medical History:  Past Medical History:   Diagnosis Date    Hyperlipidemia     Hypertension        Past surgical History:  Past Surgical History:   Procedure Laterality Date    AUGMENTATION MAMMAPLASTY Bilateral     CARDIAC CATHETERIZATION  20 years ago approx    I was in rehab, developed chest pain, sent to [facility], had a heart cath. There was some blockage but insignificant. No treatment.       Social History:  Social History     Socioeconomic History    Marital status:    Tobacco Use    Smoking status: Some Days     Packs/day: 0.25     Years: 0.50     Pack years: 0.13     Types:  Cigarettes    Smokeless tobacco: Never    Tobacco comments:     intermittent smoking for 40 years   Vaping Use    Vaping Use: Never used   Substance and Sexual Activity    Alcohol use: Not Currently     Comment: sober for 12 years    Drug use: Never    Sexual activity: Yes     Partners: Male     Birth control/protection: Post-menopausal       Review of Systems:  The following systems were reviewed as they relate to the cardiovascular system: Constitutional, Eyes, ENT, Cardiovascular, Respiratory, Gastrointestinal, Integumentary, Neurological, Psychiatric, Hematologic, Endocrine, Musculoskeletal, and Genitourinary. The pertinent cardiovascular findings are reported above with all other cardiovascular points within those systems being negative.    Diagnostic Study Review:     Current Electrocardiogram:    ECG 12 Lead    Date/Time: 7/20/2023 6:22 PM  Performed by: Ranjit Crespo DO  Authorized by: Ranjit Crespo DO   Comparison: not compared with previous ECG   Previous ECG: no previous ECG available  Comments: Normal sinus rhythm with a ventricular rate of 70 bpm.  Nonspecific repolarization changes.  Normal QT and QTc intervals.        Laboratory Data:  Lab Results   Component Value Date    GLUCOSE 91 01/18/2023    BUN 18 01/18/2023    CREATININE 0.77 01/18/2023    EGFRIFNONA 72 03/12/2021    EGFRIFAFRI 97 01/05/2016    BCR 23.4 01/18/2023    K 4.1 01/18/2023    CO2 30.0 (H) 01/18/2023    CALCIUM 9.6 01/18/2023    ALBUMIN 4.5 01/18/2023    LABIL2 1.3 03/13/2019    AST 33 (H) 01/18/2023    ALT 34 (H) 01/18/2023     Lab Results   Component Value Date    GLUCOSE 91 01/18/2023    CALCIUM 9.6 01/18/2023     01/18/2023    K 4.1 01/18/2023    CO2 30.0 (H) 01/18/2023     01/18/2023    BUN 18 01/18/2023    CREATININE 0.77 01/18/2023    EGFRIFAFRI 97 01/05/2016    EGFRIFNONA 72 03/12/2021    BCR 23.4 01/18/2023    ANIONGAP 8.0 01/18/2023     Lab Results   Component Value Date    WBC 8.32  03/08/2023    HGB 15.1 03/08/2023    HCT 44.6 03/08/2023    MCV 92.0 03/08/2023     03/08/2023     Lab Results   Component Value Date    CHOL 207 (H) 07/25/2023    TRIG 247 (H) 07/25/2023    HDL 38 (L) 07/25/2023     (H) 07/25/2023     Lab Results   Component Value Date    HGBA1C 5.0 01/18/2023     No results found for: INR, PROTIME    Most Recent Echo:       Most Recent Stress Test:       Most Recent Cardiac Catheterization:   No results found for this or any previous visit.       NOTE: The following portions of the patient's note were reviewed, confirmed and/or updated this visit as appropriate: History of present illness/Interval history, physical examination, assessment & plan, allergies, current medications, past family history, past medical history, past social history, past surgical history and problem list.Answers submitted by the patient for this visit:  Other (Submitted on 7/19/2023)  Please describe your symptoms.: intermittent chest discomfort and left arm aching, fatique, family hx cardiac  Have you had these symptoms before?: Yes  How long have you been having these symptoms?: Greater than 2 weeks  Please list any medications you are currently taking for this condition.: Losartan/Hydrochlorothiazide 100/25 daily, Prozac 80mg daily,  mg daily, Vitamin D3 1000 units daily  Please describe any probable cause for these symptoms. : Concerned its cardiac. Father had multiple heart attacks, stents and CABG. Sister has had stent placement. I personally am a recovering alcoholic. I have been sober for 12 years. History of heavy smoking. I have quit smoking multiple times over the years for months and years at a time. Recently started back smoking 5 cigarettes a day for a little over a month. My cholesterol, triglycerides are elevated.  Primary Reason for Visit (Submitted on 7/19/2023)  What is the primary reason for your visit?: Other

## 2023-07-25 ENCOUNTER — LAB (OUTPATIENT)
Dept: LAB | Facility: HOSPITAL | Age: 63
End: 2023-07-25
Payer: COMMERCIAL

## 2023-07-25 DIAGNOSIS — I10 PRIMARY HYPERTENSION: ICD-10-CM

## 2023-07-25 DIAGNOSIS — R06.09 DYSPNEA ON EXERTION: ICD-10-CM

## 2023-07-25 DIAGNOSIS — R07.9 CHEST PAIN, UNSPECIFIED TYPE: ICD-10-CM

## 2023-07-25 LAB
CHOLEST SERPL-MCNC: 207 MG/DL (ref 0–200)
HDLC SERPL-MCNC: 38 MG/DL (ref 40–60)
LDLC SERPL CALC-MCNC: 125 MG/DL (ref 0–100)
LDLC/HDLC SERPL: 3.15 {RATIO}
TRIGL SERPL-MCNC: 247 MG/DL (ref 0–150)
VLDLC SERPL-MCNC: 44 MG/DL (ref 5–40)

## 2023-07-25 PROCEDURE — 36415 COLL VENOUS BLD VENIPUNCTURE: CPT

## 2023-07-25 PROCEDURE — 80061 LIPID PANEL: CPT

## 2023-08-02 ENCOUNTER — HOSPITAL ENCOUNTER (OUTPATIENT)
Dept: NUCLEAR MEDICINE | Facility: HOSPITAL | Age: 63
Discharge: HOME | End: 2023-08-02
Payer: COMMERCIAL

## 2023-08-02 DIAGNOSIS — R06.09 DYSPNEA ON EXERTION: ICD-10-CM

## 2023-08-02 DIAGNOSIS — I10 PRIMARY HYPERTENSION: ICD-10-CM

## 2023-08-02 DIAGNOSIS — R07.9 CHEST PAIN, UNSPECIFIED TYPE: ICD-10-CM

## 2023-08-02 LAB
BH CV REST NUCLEAR ISOTOPE DOSE: 11 MCI
BH CV STRESS BP STAGE 1: NORMAL
BH CV STRESS BP STAGE 2: NORMAL
BH CV STRESS BP STAGE 3: NORMAL
BH CV STRESS DURATION MIN STAGE 1: 3
BH CV STRESS DURATION MIN STAGE 2: 3
BH CV STRESS DURATION MIN STAGE 3: 3
BH CV STRESS DURATION SEC STAGE 1: 0
BH CV STRESS DURATION SEC STAGE 2: 0
BH CV STRESS DURATION SEC STAGE 3: 0
BH CV STRESS GRADE STAGE 1: 10
BH CV STRESS GRADE STAGE 2: 12
BH CV STRESS GRADE STAGE 3: 14
BH CV STRESS HR STAGE 1: 97
BH CV STRESS HR STAGE 2: 128
BH CV STRESS HR STAGE 3: 133
BH CV STRESS METS STAGE 1: 5
BH CV STRESS METS STAGE 2: 7.5
BH CV STRESS METS STAGE 3: 10
BH CV STRESS NUCLEAR ISOTOPE DOSE: 33 MCI
BH CV STRESS PROTOCOL 1: NORMAL
BH CV STRESS RECOVERY BP: NORMAL MMHG
BH CV STRESS RECOVERY HR: 89 BPM
BH CV STRESS SPEED STAGE 1: 1.7
BH CV STRESS SPEED STAGE 2: 2.5
BH CV STRESS SPEED STAGE 3: 3.4
BH CV STRESS STAGE 1: 1
BH CV STRESS STAGE 2: 2
BH CV STRESS STAGE 3: 3
LV EF NUC BP: 65 %
MAXIMAL PREDICTED HEART RATE: 156 BPM
PERCENT MAX PREDICTED HR: 85.26 %
STRESS BASELINE BP: NORMAL MMHG
STRESS BASELINE HR: 82 BPM
STRESS PERCENT HR: 100 %
STRESS POST ESTIMATED WORKLOAD: 10.1 METS
STRESS POST EXERCISE DUR MIN: 8 MIN
STRESS POST EXERCISE DUR SEC: 1 SEC
STRESS POST PEAK BP: NORMAL MMHG
STRESS POST PEAK HR: 133 BPM
STRESS TARGET HR: 133 BPM

## 2023-08-02 PROCEDURE — 0 TECHNETIUM TETROFOSMIN KIT: Performed by: INTERNAL MEDICINE

## 2023-08-02 PROCEDURE — A9502 TC99M TETROFOSMIN: HCPCS | Performed by: INTERNAL MEDICINE

## 2023-08-02 PROCEDURE — 78452 HT MUSCLE IMAGE SPECT MULT: CPT

## 2023-08-02 PROCEDURE — 93017 CV STRESS TEST TRACING ONLY: CPT

## 2023-08-02 RX ADMIN — TETROFOSMIN 1 DOSE: 1.38 INJECTION, POWDER, LYOPHILIZED, FOR SOLUTION INTRAVENOUS at 09:28

## 2023-08-02 RX ADMIN — TETROFOSMIN 1 DOSE: 1.38 INJECTION, POWDER, LYOPHILIZED, FOR SOLUTION INTRAVENOUS at 10:59

## 2023-08-23 ENCOUNTER — HOSPITAL ENCOUNTER (OUTPATIENT)
Dept: CARDIOLOGY | Facility: HOSPITAL | Age: 63
Discharge: HOME | End: 2023-08-23
Admitting: INTERNAL MEDICINE
Payer: COMMERCIAL

## 2023-08-23 DIAGNOSIS — R07.9 CHEST PAIN, UNSPECIFIED TYPE: ICD-10-CM

## 2023-08-23 DIAGNOSIS — I10 PRIMARY HYPERTENSION: ICD-10-CM

## 2023-08-23 DIAGNOSIS — R06.09 DYSPNEA ON EXERTION: ICD-10-CM

## 2023-08-23 LAB
BH CV ECHO MEAS - ACS: 1.68 CM
BH CV ECHO MEAS - AO MAX PG: 3.9 MMHG
BH CV ECHO MEAS - AO MEAN PG: 2.01 MMHG
BH CV ECHO MEAS - AO ROOT DIAM: 2.8 CM
BH CV ECHO MEAS - AO V2 MAX: 98.2 CM/SEC
BH CV ECHO MEAS - AO V2 VTI: 22.9 CM
BH CV ECHO MEAS - AVA(I,D): 2.49 CM2
BH CV ECHO MEAS - EDV(CUBED): 93.3 ML
BH CV ECHO MEAS - EDV(MOD-SP4): 39 ML
BH CV ECHO MEAS - EF(MOD-BP): 54 %
BH CV ECHO MEAS - EF(MOD-SP4): 54.2 %
BH CV ECHO MEAS - ESV(CUBED): 35.6 ML
BH CV ECHO MEAS - ESV(MOD-SP4): 17.9 ML
BH CV ECHO MEAS - FS: 27.5 %
BH CV ECHO MEAS - IVS/LVPW: 1.06 CM
BH CV ECHO MEAS - IVSD: 0.84 CM
BH CV ECHO MEAS - LA DIMENSION: 3.6 CM
BH CV ECHO MEAS - LV MASS(C)D: 117.9 GRAMS
BH CV ECHO MEAS - LV MAX PG: 2.8 MMHG
BH CV ECHO MEAS - LV MEAN PG: 1.36 MMHG
BH CV ECHO MEAS - LV V1 MAX: 83 CM/SEC
BH CV ECHO MEAS - LV V1 VTI: 19.4 CM
BH CV ECHO MEAS - LVIDD: 4.5 CM
BH CV ECHO MEAS - LVIDS: 3.3 CM
BH CV ECHO MEAS - LVOT AREA: 2.9 CM2
BH CV ECHO MEAS - LVOT DIAM: 1.93 CM
BH CV ECHO MEAS - LVPWD: 0.79 CM
BH CV ECHO MEAS - MV A MAX VEL: 84.8 CM/SEC
BH CV ECHO MEAS - MV DEC SLOPE: 193.3 CM/SEC2
BH CV ECHO MEAS - MV DEC TIME: 0.31 MSEC
BH CV ECHO MEAS - MV E MAX VEL: 59 CM/SEC
BH CV ECHO MEAS - MV E/A: 0.7
BH CV ECHO MEAS - MV MAX PG: 2.7 MMHG
BH CV ECHO MEAS - MV MEAN PG: 0.9 MMHG
BH CV ECHO MEAS - MV V2 VTI: 22 CM
BH CV ECHO MEAS - MVA(VTI): 2.6 CM2
BH CV ECHO MEAS - PA V2 MAX: 65.1 CM/SEC
BH CV ECHO MEAS - PULM A REVS DUR: 0.1 SEC
BH CV ECHO MEAS - PULM A REVS VEL: 33.6 CM/SEC
BH CV ECHO MEAS - PULM DIAS VEL: 37.2 CM/SEC
BH CV ECHO MEAS - PULM S/D: 1.29
BH CV ECHO MEAS - PULM SYS VEL: 48 CM/SEC
BH CV ECHO MEAS - QP/QS: 0.5
BH CV ECHO MEAS - RAP SYSTOLE: 3 MMHG
BH CV ECHO MEAS - RV MAX PG: 1.44 MMHG
BH CV ECHO MEAS - RV V1 MAX: 60.1 CM/SEC
BH CV ECHO MEAS - RV V1 VTI: 12.7 CM
BH CV ECHO MEAS - RVDD: 3.5 CM
BH CV ECHO MEAS - RVOT DIAM: 1.68 CM
BH CV ECHO MEAS - RVSP: 26.7 MMHG
BH CV ECHO MEAS - SV(LVOT): 57 ML
BH CV ECHO MEAS - SV(MOD-SP4): 21.2 ML
BH CV ECHO MEAS - SV(RVOT): 28.2 ML
BH CV ECHO MEAS - TR MAX PG: 23.7 MMHG
BH CV ECHO MEAS - TR MAX VEL: 243.4 CM/SEC

## 2023-08-23 PROCEDURE — 93306 TTE W/DOPPLER COMPLETE: CPT

## 2023-08-30 RX ORDER — FLUOXETINE HYDROCHLORIDE 40 MG/1
80 CAPSULE ORAL DAILY
Qty: 180 CAPSULE | Refills: 1 | Status: CANCELLED | OUTPATIENT
Start: 2023-08-30

## 2023-08-30 RX ORDER — FLUOXETINE HYDROCHLORIDE 40 MG/1
80 CAPSULE ORAL DAILY
Qty: 180 CAPSULE | Refills: 1 | Status: SHIPPED | OUTPATIENT
Start: 2023-08-30

## 2023-09-20 ENCOUNTER — TELEPHONE (OUTPATIENT)
Dept: FAMILY MEDICINE CLINIC | Facility: CLINIC | Age: 63
End: 2023-09-20
Payer: COMMERCIAL

## 2023-09-20 DIAGNOSIS — Z00.00 PREVENTATIVE HEALTH CARE: ICD-10-CM

## 2023-09-20 DIAGNOSIS — I10 PRIMARY HYPERTENSION: ICD-10-CM

## 2023-09-20 DIAGNOSIS — Z11.59 NEED FOR HEPATITIS C SCREENING TEST: Primary | ICD-10-CM

## 2023-09-20 DIAGNOSIS — E55.9 VITAMIN D DEFICIENCY: ICD-10-CM

## 2023-09-20 NOTE — TELEPHONE ENCOUNTER
Caller: Shanon Duvall    Relationship: Self    Best call back number: [phone]     What orders are you requesting (i.e. lab or imaging): FASTING ROUTINE LABS PLUS CHOLESTEROL PANEL     In what timeframe would the patient need to come in: ASAP     Where will you receive your lab/imaging services: [facility]     Additional notes:     PLEASE ADVISE

## 2023-09-20 NOTE — TELEPHONE ENCOUNTER
Caller: Shanon Duvall    Relationship: Self    Best call back number: [phone]     What orders are you requesting (i.e. lab or imaging):     BONE DENSITY SCAN    A CHEST/ABDOMEN CT SCAN    CT SCAN ON HER PELVIC AREA.     In what timeframe would the patient need to come in: ASAP    Where will you receive your lab/imaging services: [facility]     Additional notes:     THE BONE DENSITY SCAN IS JUST ROUTINE.    THE CHEST/ABDOMEN CT SCAN IS POST CHEST PAIN- SHE HAD SEEN HER CARDIOLOGIST AND THEY RULED OUT HEART RELATED ISSUES, BUT SHE IS STILL HAVING THIS RIGHT MID QUADRANT INTERMITTENT PAIN- SHE MENTIONED DIVERTICULITIS- IT IS NOT ALL THE TIME, JUST ENOUGH TO GET HER ATTENTION.    FOR THE CT SCAN ON HER PELVIC/HIP AREA, SHE STATES SHE HAS RIGHT SIDE, HIP PAIN THAT RADIATES DOWN TO HER RIGHT KNEE.     PLEASE ADVISE

## 2023-09-20 NOTE — TELEPHONE ENCOUNTER
Please let patient know that Dr. Espinoza is out of the office until 10/9/23. Regarding the CT scan requests, I would recommend her see one of the in office providers if symptoms are worsening.     On the DEXA scan, and yearly testing, those will be ordered at her next appointment. Please change her 11/29 appt to a physical, no need to move it, and I will place lab orders for 1 week prior to that appointment.

## 2023-09-21 NOTE — TELEPHONE ENCOUNTER
RELAY (Left detailed message on patient's voice mail at 11:24am.)    Please let patient know that Dr. Espinoza is out of the office until 10/9/23. Regarding the CT scan requests, I would recommend her see one of the in office providers if symptoms are worsening.      On the DEXA scan, and yearly testing, those will be ordered at her next appointment. Please change her 11/29 appt to a physical, no need to move it, and I will place lab orders for 1 week prior to that appointment.

## 2023-09-22 NOTE — TELEPHONE ENCOUNTER
Left message with patient's  at 10:48am for patient to call back and ask for Kathleen in the office.

## 2023-09-28 ENCOUNTER — TRANSCRIBE ORDERS (OUTPATIENT)
Dept: ADMINISTRATIVE | Facility: HOSPITAL | Age: 63
End: 2023-09-28
Payer: COMMERCIAL

## 2023-09-28 ENCOUNTER — HOSPITAL ENCOUNTER (OUTPATIENT)
Dept: CT IMAGING | Facility: HOSPITAL | Age: 63
Discharge: HOME | End: 2023-09-28
Admitting: INTERNAL MEDICINE
Payer: MEDICAID

## 2023-09-28 DIAGNOSIS — Z13.6 ENCOUNTER FOR SCREENING FOR CARDIOVASCULAR DISORDERS: ICD-10-CM

## 2023-09-28 DIAGNOSIS — Z13.6 ENCOUNTER FOR SCREENING FOR CARDIOVASCULAR DISORDERS: Primary | ICD-10-CM

## 2023-09-28 PROCEDURE — 75571 CT HRT W/O DYE W/CA TEST: CPT

## 2023-09-29 DIAGNOSIS — I25.10 ATHEROSCLEROSIS OF NATIVE CORONARY ARTERY OF NATIVE HEART WITHOUT ANGINA PECTORIS: Primary | ICD-10-CM

## 2023-09-29 RX ORDER — ONDANSETRON 2 MG/ML
4 INJECTION INTRAMUSCULAR; INTRAVENOUS EVERY 6 HOURS PRN
OUTPATIENT
Start: 2023-09-29

## 2023-09-29 RX ORDER — SODIUM CHLORIDE 0.9 % (FLUSH) 0.9 %
3 SYRINGE (ML) INJECTION EVERY 12 HOURS SCHEDULED
OUTPATIENT
Start: 2023-09-29

## 2023-09-29 RX ORDER — HYDROCODONE BITARTRATE AND ACETAMINOPHEN 5; 325 MG/1; MG/1
1 TABLET ORAL EVERY 4 HOURS PRN
OUTPATIENT
Start: 2023-09-29 | End: 2023-10-09

## 2023-09-29 RX ORDER — SODIUM CHLORIDE 0.9 % (FLUSH) 0.9 %
3-10 SYRINGE (ML) INJECTION AS NEEDED
OUTPATIENT
Start: 2023-09-29

## 2023-09-29 RX ORDER — ACETAMINOPHEN 325 MG/1
500 TABLET ORAL EVERY 4 HOURS PRN
OUTPATIENT
Start: 2023-09-29

## 2023-09-29 RX ORDER — ASPIRIN 81 MG/1
324 TABLET, CHEWABLE ORAL ONCE
OUTPATIENT
Start: 2023-09-29

## 2023-09-29 RX ORDER — ASPIRIN 81 MG/1
81 TABLET ORAL DAILY
OUTPATIENT
Start: 2023-09-29

## 2023-09-29 RX ORDER — NITROGLYCERIN 0.4 MG/1
0.4 TABLET SUBLINGUAL
OUTPATIENT
Start: 2023-09-29

## 2023-09-29 RX ORDER — SODIUM CHLORIDE 9 MG/ML
40 INJECTION, SOLUTION INTRAVENOUS AS NEEDED
OUTPATIENT
Start: 2023-09-29

## 2023-09-30 ENCOUNTER — LAB (OUTPATIENT)
Dept: LAB | Facility: HOSPITAL | Age: 63
End: 2023-09-30
Payer: COMMERCIAL

## 2023-09-30 DIAGNOSIS — Z00.00 PREVENTATIVE HEALTH CARE: ICD-10-CM

## 2023-09-30 DIAGNOSIS — I25.10 ATHEROSCLEROSIS OF NATIVE CORONARY ARTERY OF NATIVE HEART WITHOUT ANGINA PECTORIS: ICD-10-CM

## 2023-09-30 DIAGNOSIS — I25.10 ATHEROSCLEROSIS OF NATIVE CORONARY ARTERY OF NATIVE HEART WITHOUT ANGINA PECTORIS: Primary | ICD-10-CM

## 2023-09-30 LAB
ANION GAP SERPL CALCULATED.3IONS-SCNC: 13.8 MMOL/L (ref 5–15)
BUN SERPL-MCNC: 18 MG/DL (ref 8–23)
BUN/CREAT SERPL: 24.3 (ref 7–25)
CALCIUM SPEC-SCNC: 9.5 MG/DL (ref 8.6–10.5)
CHLORIDE SERPL-SCNC: 99 MMOL/L (ref 98–107)
CHOLEST SERPL-MCNC: 211 MG/DL (ref 0–200)
CO2 SERPL-SCNC: 24.2 MMOL/L (ref 22–29)
CREAT SERPL-MCNC: 0.74 MG/DL (ref 0.57–1)
DEPRECATED RDW RBC AUTO: 45.5 FL (ref 37–54)
EGFRCR SERPLBLD CKD-EPI 2021: 90.5 ML/MIN/1.73
ERYTHROCYTE [DISTWIDTH] IN BLOOD BY AUTOMATED COUNT: 13.9 % (ref 12.3–15.4)
GLUCOSE SERPL-MCNC: 83 MG/DL (ref 65–99)
HCT VFR BLD AUTO: 47.7 % (ref 34–46.6)
HDLC SERPL-MCNC: 36 MG/DL (ref 40–60)
HGB BLD-MCNC: 15.9 G/DL (ref 12–15.9)
INR PPP: 0.99 (ref 0.93–1.1)
LDLC SERPL CALC-MCNC: 124 MG/DL (ref 0–100)
LDLC/HDLC SERPL: 3.24 {RATIO}
MCH RBC QN AUTO: 31.1 PG (ref 26.6–33)
MCHC RBC AUTO-ENTMCNC: 33.4 G/DL (ref 31.5–35.7)
MCV RBC AUTO: 93.2 FL (ref 79–97)
PLATELET # BLD AUTO: 278 10*3/MM3 (ref 140–450)
PMV BLD AUTO: 8.1 FL (ref 6–12)
POTASSIUM SERPL-SCNC: 3.9 MMOL/L (ref 3.5–5.2)
PROTHROMBIN TIME: 10.8 SECONDS (ref 9.6–11.7)
RBC # BLD AUTO: 5.12 10*6/MM3 (ref 3.77–5.28)
SODIUM SERPL-SCNC: 137 MMOL/L (ref 136–145)
TRIGL SERPL-MCNC: 291 MG/DL (ref 0–150)
VLDLC SERPL-MCNC: 51 MG/DL (ref 5–40)
WBC NRBC COR # BLD: 9 10*3/MM3 (ref 3.4–10.8)

## 2023-09-30 PROCEDURE — 85610 PROTHROMBIN TIME: CPT

## 2023-09-30 PROCEDURE — 36415 COLL VENOUS BLD VENIPUNCTURE: CPT

## 2023-09-30 PROCEDURE — 85027 COMPLETE CBC AUTOMATED: CPT

## 2023-09-30 PROCEDURE — 80048 BASIC METABOLIC PNL TOTAL CA: CPT

## 2023-09-30 PROCEDURE — 80061 LIPID PANEL: CPT

## 2023-10-02 DIAGNOSIS — E78.2 MIXED HYPERLIPIDEMIA: Primary | ICD-10-CM

## 2023-10-03 ENCOUNTER — HOSPITAL ENCOUNTER (OUTPATIENT)
Facility: HOSPITAL | Age: 63
Setting detail: HOSPITAL OUTPATIENT SURGERY
Discharge: HOME | End: 2023-10-03
Attending: INTERNAL MEDICINE | Admitting: INTERNAL MEDICINE
Payer: COMMERCIAL

## 2023-10-03 VITALS
SYSTOLIC BLOOD PRESSURE: 108 MMHG | BODY MASS INDEX: 27.75 KG/M2 | HEIGHT: 62 IN | OXYGEN SATURATION: 94 % | DIASTOLIC BLOOD PRESSURE: 73 MMHG | RESPIRATION RATE: 19 BRPM | TEMPERATURE: 98.4 F | WEIGHT: 150.79 LBS | HEART RATE: 63 BPM

## 2023-10-03 DIAGNOSIS — I25.10 ATHEROSCLEROSIS OF NATIVE CORONARY ARTERY OF NATIVE HEART WITHOUT ANGINA PECTORIS: ICD-10-CM

## 2023-10-03 PROCEDURE — 25010000002 HEPARIN (PORCINE) PER 1000 UNITS: Performed by: INTERNAL MEDICINE

## 2023-10-03 PROCEDURE — 93458 L HRT ARTERY/VENTRICLE ANGIO: CPT | Performed by: INTERNAL MEDICINE

## 2023-10-03 PROCEDURE — 25810000003 SODIUM CHLORIDE 0.9 % SOLUTION: Performed by: INTERNAL MEDICINE

## 2023-10-03 PROCEDURE — 99153 MOD SED SAME PHYS/QHP EA: CPT | Performed by: INTERNAL MEDICINE

## 2023-10-03 PROCEDURE — 25010000002 MIDAZOLAM PER 1 MG: Performed by: INTERNAL MEDICINE

## 2023-10-03 PROCEDURE — 25010000002 NITROGLYCERIN 5 MG/ML SOLUTION: Performed by: INTERNAL MEDICINE

## 2023-10-03 PROCEDURE — 25010000002 FENTANYL CITRATE (PF) 100 MCG/2ML SOLUTION: Performed by: INTERNAL MEDICINE

## 2023-10-03 PROCEDURE — S0260 H&P FOR SURGERY: HCPCS | Performed by: INTERNAL MEDICINE

## 2023-10-03 PROCEDURE — C1894 INTRO/SHEATH, NON-LASER: HCPCS | Performed by: INTERNAL MEDICINE

## 2023-10-03 PROCEDURE — 25510000001 IOPAMIDOL PER 1 ML: Performed by: INTERNAL MEDICINE

## 2023-10-03 PROCEDURE — C1769 GUIDE WIRE: HCPCS | Performed by: INTERNAL MEDICINE

## 2023-10-03 PROCEDURE — 99152 MOD SED SAME PHYS/QHP 5/>YRS: CPT | Performed by: INTERNAL MEDICINE

## 2023-10-03 RX ORDER — SODIUM CHLORIDE 9 MG/ML
INJECTION, SOLUTION INTRAVENOUS
Status: COMPLETED | OUTPATIENT
Start: 2023-10-03 | End: 2023-10-03

## 2023-10-03 RX ORDER — NICARDIPINE HYDROCHLORIDE 2.5 MG/ML
INJECTION INTRAVENOUS
Status: DISCONTINUED | OUTPATIENT
Start: 2023-10-03 | End: 2023-10-03 | Stop reason: HOSPADM

## 2023-10-03 RX ORDER — SODIUM CHLORIDE 0.9 % (FLUSH) 0.9 %
3 SYRINGE (ML) INJECTION EVERY 12 HOURS SCHEDULED
Status: DISCONTINUED | OUTPATIENT
Start: 2023-10-03 | End: 2023-10-03 | Stop reason: HOSPADM

## 2023-10-03 RX ORDER — SODIUM CHLORIDE 9 MG/ML
40 INJECTION, SOLUTION INTRAVENOUS AS NEEDED
Status: DISCONTINUED | OUTPATIENT
Start: 2023-10-03 | End: 2023-10-03 | Stop reason: HOSPADM

## 2023-10-03 RX ORDER — NITROGLYCERIN 5 MG/ML
INJECTION, SOLUTION INTRAVENOUS
Status: DISCONTINUED | OUTPATIENT
Start: 2023-10-03 | End: 2023-10-03 | Stop reason: HOSPADM

## 2023-10-03 RX ORDER — ASPIRIN 81 MG/1
81 TABLET ORAL DAILY
Status: DISCONTINUED | OUTPATIENT
Start: 2023-10-04 | End: 2023-10-03 | Stop reason: HOSPADM

## 2023-10-03 RX ORDER — SODIUM CHLORIDE 0.9 % (FLUSH) 0.9 %
3-10 SYRINGE (ML) INJECTION AS NEEDED
Status: DISCONTINUED | OUTPATIENT
Start: 2023-10-03 | End: 2023-10-03 | Stop reason: HOSPADM

## 2023-10-03 RX ORDER — ASPIRIN 81 MG/1
324 TABLET, CHEWABLE ORAL ONCE
Status: DISCONTINUED | OUTPATIENT
Start: 2023-10-03 | End: 2023-10-03 | Stop reason: HOSPADM

## 2023-10-03 RX ORDER — HYDROCODONE BITARTRATE AND ACETAMINOPHEN 5; 325 MG/1; MG/1
1 TABLET ORAL EVERY 4 HOURS PRN
Status: DISCONTINUED | OUTPATIENT
Start: 2023-10-03 | End: 2023-10-03 | Stop reason: HOSPADM

## 2023-10-03 RX ORDER — FENTANYL CITRATE 50 UG/ML
INJECTION, SOLUTION INTRAMUSCULAR; INTRAVENOUS
Status: DISCONTINUED | OUTPATIENT
Start: 2023-10-03 | End: 2023-10-03 | Stop reason: HOSPADM

## 2023-10-03 RX ORDER — MIDAZOLAM HYDROCHLORIDE 1 MG/ML
INJECTION INTRAMUSCULAR; INTRAVENOUS
Status: DISCONTINUED | OUTPATIENT
Start: 2023-10-03 | End: 2023-10-03 | Stop reason: HOSPADM

## 2023-10-03 RX ORDER — ONDANSETRON 2 MG/ML
4 INJECTION INTRAMUSCULAR; INTRAVENOUS EVERY 6 HOURS PRN
Status: DISCONTINUED | OUTPATIENT
Start: 2023-10-03 | End: 2023-10-03 | Stop reason: HOSPADM

## 2023-10-03 RX ORDER — NITROGLYCERIN 0.4 MG/1
0.4 TABLET SUBLINGUAL
Status: DISCONTINUED | OUTPATIENT
Start: 2023-10-03 | End: 2023-10-03 | Stop reason: HOSPADM

## 2023-10-03 RX ORDER — LIDOCAINE HYDROCHLORIDE 20 MG/ML
INJECTION, SOLUTION INFILTRATION; PERINEURAL
Status: DISCONTINUED | OUTPATIENT
Start: 2023-10-03 | End: 2023-10-03 | Stop reason: HOSPADM

## 2023-10-03 RX ORDER — METHYLPREDNISOLONE SODIUM SUCCINATE 125 MG/2ML
125 INJECTION, POWDER, LYOPHILIZED, FOR SOLUTION INTRAMUSCULAR; INTRAVENOUS ONCE
Status: DISCONTINUED | OUTPATIENT
Start: 2023-10-03 | End: 2023-10-03 | Stop reason: HOSPADM

## 2023-10-03 RX ORDER — ACETAMINOPHEN 500 MG
500 TABLET ORAL EVERY 4 HOURS PRN
Status: DISCONTINUED | OUTPATIENT
Start: 2023-10-03 | End: 2023-10-03 | Stop reason: HOSPADM

## 2023-10-03 RX ORDER — HEPARIN SODIUM 1000 [USP'U]/ML
INJECTION, SOLUTION INTRAVENOUS; SUBCUTANEOUS
Status: DISCONTINUED | OUTPATIENT
Start: 2023-10-03 | End: 2023-10-03 | Stop reason: HOSPADM

## 2023-10-03 RX ORDER — ATORVASTATIN CALCIUM 20 MG/1
20 TABLET, FILM COATED ORAL DAILY
Qty: 90 TABLET | Refills: 3 | Status: SHIPPED | OUTPATIENT
Start: 2023-10-03

## 2023-10-03 NOTE — Clinical Note
Hemostasis started on the right radial artery. R-Band was used in achieving hemostasis. Radial compression device applied to vessel. Hemostasis achieved successfully. Closure device additional comment: 13cc air in band

## 2023-10-03 NOTE — DISCHARGE INSTRUCTIONS
Post Cath Instructions    Drink plenty of fluids for the next 24 hours.  This helps to eliminate the dye used in your procedure through urination.  You may resume a normal diet; however, try to avoid foods that would cause gas or constipation.    Sedative medication given to you during your catheterization may decrease your judgement and reaction time for up to 24-48 hours.  Therefore:  DO NOT drive or operate hazardous machinery (48 hours)  DO NOT consume alcoholic beverages  DO NOT make any important/legal decisions  Have someone stay with you for at least 24 hours    To allow proper healing and prevent bleeding, the following activities are to be strictly avoided for the next 24-48 hours:  Excessive bending at wound site  Straining (anything that would tense up muscles around the affected puncture site)  Lifting objects greater than 5 pounds, pushing, or pulling for 5 days  For Arm Cases:  No flexing at the puncture site, such as hammering, golfing, bowling, or swinging any objects  For Groin Cases:  Refrain from sexual activity  Refrain from running or vigorous walking  No prolonged sitting or standing  Limit stair climbing as much as possible    Keep the puncture site clean and dry.  You may remove the dressing tomorrow and replace it with a band-aid for at least one additional day.  Gently clean the site with mild soap and water.  No scrubbing/rubbing and lightly pat the area dry.  Showers are acceptable; however, avoid submerging in water (tub baths, hot tubs, swimming pools, dishwater, etc…) for at least one week.  The site should be completely healed before resuming these activities to reduce the risk of infection.  Check the site often.  Watch for signs and symptoms of infection and notify your physician if any of the following occur:  Bleeding or an increase in swelling at the puncture site  Fever  Increased soreness around puncture site  Foul odor or significant drainage from the puncture site  Swelling,  redness, or warmth at the puncture site    **A bruise or small “pea sized” lump under the skin at the puncture site is not unusual.  This should disappear within 3-4 weeks.**  CONTACT YOUR PHYSICIAN OR CALL 911 IF YOU EXPERIENCE ANY OF THE FOLLOWING:  Increased angina (chest pain) or frequent sensations of pressure, burning, pain, or other discomfort in the chest, arm, jaws, or stomach  Lightheadedness, dizziness, faint feeling, sweating, or difficulty breathing  Odd sensation changes like numbness, tingling, coldness, or pain in the arm or leg in which the catheter was inserted  Limb in which the catheter was inserted becomes pale/bluish in color    IMPORTANT:  Although this occurs very rarely, if you should develop bright red or excessive bleeding, feel a “pop” inside at the insertion site, or notice a sudden increase in swelling larger than a walnut, you should call 911.  Hold continuous firm pressure to the access site until emergency personnel arrive.  It is best if someone else can do this for you.

## 2023-10-03 NOTE — LETTER
October 3, 2023     Patient: Shanon Duvall   YOB: 1960   Date of Visit: 9/29/2023       To Whom It May Concern:    It is my medical opinion that Shanon Duvall {Work release (duty restriction):22698}.           Sincerely,        No name on file    CC:   No Recipients

## 2023-10-03 NOTE — H&P
HISTORY AND PHYSICAL      PATIENT IDENTIFICATION    Name: Shanon Duvall  Age: 62 y.o. Sex: female : 1960  MRN: [id number]    CHIEF COMPLAINT:  Dyspnea on exertion  Abnormal coronary calcium score    HISTORY OF PRESENT ILLNESS:   I had the pleasure of seeing Shanon Duvall today. As you are well aware, this is a 62 y.o. female with no established history of ischemic heart disease.  She does have a history of anxiety, hypertension, and acid reflux.  She presents today for the evaluation of chest pain.     She is reporting some intermittent chest discomfort.  She notes its been going on for several months.  More recently it has become more frequent.  She describes it as an ache that is moderate in intensity.  There is some occasional radiation into her left arm and neck.  It can occur 3-4 times a day.  She will have some shortness of breath with exertion.  It will last a minute or 2 and dissipates without intervention.     She does note that activities of daily living do not cause much of an issue unless there is an incline involved.  She will get short of breath with this.  She is also reporting some palpitations.  These are occasional and will last for a few minutes and dissipate on their own.     She is a smoker.  She has smoked for about 40 years.  She estimates that she smoked about a half a pack a day during that time when averaged out.     She does have a family history of ischemic heart disease.  Her father was diagnosed with coronary artery disease in in his 60s.  Her sister is 71 and recently had a circumflex stent placed.  Her brother just had a calcium score that was elevated.     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     She has undergone ischemic work-up in the past.  She notes that about 15 years ago she had a heart catheterization.  She recalls being told that she had a minor blockage that was not severe enough for a stent.  She is additionally had a couple of  treadmill stress test but the last 1 of those was at least 10 years ago.    She underwent a nuclear stress test that was negative for ischemia.  She did however have a heart and vascular screening exam.  Her coronary calcium score was markedly elevated.  Risk-benefit and options were discussed and she wishes to proceed with cardiac catheterization.     Assessment & Plan     Impressions:  Chest pain with typical and atypical features  Dyspnea on exertion  Strong family history of coronary artery disease  Hypertension  Tobacco abuse  Acid reflux     Recommendations:  Negative nuclear stress test  Abnormal coronary calcium score  Risk benefits and options discussed.  Patient wishes to proceed with cardiac catheterization       Medical History    Past Medical History:   Diagnosis Date    Hyperlipidemia     Hypertension         Surgical History    Past Surgical History:   Procedure Laterality Date    AUGMENTATION MAMMAPLASTY Bilateral     CARDIAC CATHETERIZATION  20 years ago approx    I was in rehab, developed chest pain, sent to [facility], had a heart cath. There was some blockage but insignificant. No treatment.        Family History    Family History   Problem Relation Age of Onset    Breast cancer Maternal Aunt         50's    Coronary artery disease Father     Heart attack Father          8 years ago, probable heart  80 years old    Hyperlipidemia Father     Hypertension Sister         90% circumflex artery stent placed ? Dr. Brady    Heart disease Sister     Hypertension Brother     Hyperlipidemia Brother     Parkinsonism Half-Brother        Social History    Social History     Tobacco Use    Smoking status: Some Days     Packs/day: 0.25     Years: 0.50     Pack years: 0.13     Types: Cigarettes    Smokeless tobacco: Never    Tobacco comments:     intermittent smoking for 40 years   Substance Use Topics    Alcohol use: Not Currently     Comment: sober for 12 years        Allergies    No Known  "Allergies    REVIEW OF SYSTEMS:  Pertinent items are noted in HPI, all other systems reviewed and negative    OBJECTIVE     VITAL SIGNS:  Visit Vitals  /64 (BP Location: Left arm, Patient Position: Lying)   Pulse 76   Temp 98.4 °F (36.9 °C) (Oral)   Resp 16   Ht 157.5 cm (62\")   Wt 68.4 kg (150 lb 12.7 oz)   SpO2 98%   BMI 27.58 kg/m²     Oxygen Therapy  SpO2: 98 %  Pulse Oximetry Type: Continuous  Device (Oxygen Therapy): room air  Flowsheet Rows      Flowsheet Row First Filed Value   Admission Height 157.5 cm (62\") Documented at 10/03/2023 0722   Admission Weight 68.4 kg (150 lb 12.7 oz) Documented at 10/03/2023 0722          Intake & Output (last 3 days)       None          Lines, Drains & Airways       Active LDAs       Name Placement date Placement time Site Days    Peripheral IV 10/03/23 0740 Anterior;Distal;Left Forearm 10/03/23  0740  Forearm  less than 1                  /64 (BP Location: Left arm, Patient Position: Lying)   Pulse 76   Temp 98.4 °F (36.9 °C) (Oral)   Resp 16   Ht 157.5 cm (62\")   Wt 68.4 kg (150 lb 12.7 oz)   SpO2 98%   BMI 27.58 kg/m²     INTAKE/OUTPUT:    Intake/Output this shift:  No intake/output data recorded.  Intake/Output last 3 shifts:  No intake/output data recorded.      PHYSICAL EXAM:    General: Alert, cooperative, no distress, appears stated age  Head:  Normocephalic, atraumatic, mucous membranes moist  Eyes:  Conjunctivae/corneas clear, EOM's intact     Neck:  Supple,  no adenopathy;      Lungs: Clear to auscultation bilaterally, no wheezes, rhonchi or rales are noted  Chest wall: No tenderness  Heart::  Regular rate and rhythm, S1 and S2 normal, no murmur, rub or gallop  Abdomen: Soft, nontender, nondistended, bowel sounds active  Extremities: No cyanosis, clubbing, or edema  Pulses: 2+ and symmetric all extremities  Skin:  No rashes or lesions  Neuro/psych: A&O x3. CN II through XII are grossly intact with appropriate affect    Scheduled Meds:  "         Continuous Infusions:    No current facility-administered medications for this encounter.      PRN Meds:         Data Review:     X-rays, labs reviewed personally by provider.    CBC    Results from last 7 days   Lab Units 09/30/23  1240   WBC 10*3/mm3 9.00   HEMOGLOBIN g/dL 15.9   PLATELETS 10*3/mm3 278     Cr Clearance Estimated Creatinine Clearance: 69.6 mL/min (by C-G formula based on SCr of 0.74 mg/dL).  Coag   Results from last 7 days   Lab Units 09/30/23  1240   INR  0.99     HbA1C   Lab Results   Component Value Date    HGBA1C 5.0 01/18/2023    HGBA1C 5.1 05/12/2022    HGBA1C 5.2 03/12/2021     Blood Glucose No results found for: POCGLU  Infection     CMP   Results from last 7 days   Lab Units 09/30/23  1240   SODIUM mmol/L 137   POTASSIUM mmol/L 3.9   CHLORIDE mmol/L 99   CO2 mmol/L 24.2   BUN mg/dL 18   CREATININE mg/dL 0.74   GLUCOSE mg/dL 83     ABG      UA      JOSE A  No results found for: POCMETH, POCAMPHET, POCBARBITUR, POCBENZO, POCCOCAINE, POCOPIATES, POCOXYCODO, POCPHENCYC, POCPROPOXY, POCTHC, POCTRICYC  Lysis Labs   Results from last 7 days   Lab Units 09/30/23  1240   INR  0.99   HEMOGLOBIN g/dL 15.9   PLATELETS 10*3/mm3 278   CREATININE mg/dL 0.74     Radiology(recent) No radiology results for the last day            ECG/EMG Results (most recent)       None            Imaging:  Imaging Results (Last 72 Hours)       ** No results found for the last 72 hours. **              Ranjit Crespo DO  10/03/23  08:39 EDT

## 2023-10-04 ENCOUNTER — APPOINTMENT (OUTPATIENT)
Dept: GENERAL RADIOLOGY | Facility: HOSPITAL | Age: 63
DRG: 087 | End: 2023-10-04
Payer: COMMERCIAL

## 2023-10-04 ENCOUNTER — APPOINTMENT (OUTPATIENT)
Dept: CT IMAGING | Facility: HOSPITAL | Age: 63
DRG: 087 | End: 2023-10-04
Payer: COMMERCIAL

## 2023-10-04 ENCOUNTER — HOSPITAL ENCOUNTER (INPATIENT)
Facility: HOSPITAL | Age: 63
LOS: 2 days | Discharge: HOME | DRG: 087 | End: 2023-10-06
Attending: EMERGENCY MEDICINE | Admitting: INTERNAL MEDICINE
Payer: COMMERCIAL

## 2023-10-04 DIAGNOSIS — S06.340A TRAUMATIC RIGHT-SIDED INTRACEREBRAL HEMORRHAGE WITHOUT LOSS OF CONSCIOUSNESS, INITIAL ENCOUNTER: ICD-10-CM

## 2023-10-04 DIAGNOSIS — S01.81XA LACERATION OF FOREHEAD, INITIAL ENCOUNTER: ICD-10-CM

## 2023-10-04 DIAGNOSIS — I60.9 SUBARACHNOID HEMORRHAGE: ICD-10-CM
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
## 2023-10-04 DIAGNOSIS — I60.9 SUBARACHNOID BLEED: ICD-10-CM

## 2023-10-04 DIAGNOSIS — S02.92XA MULTIPLE CLOSED FRACTURES OF FACIAL BONE, INITIAL ENCOUNTER: ICD-10-CM

## 2023-10-04 LAB
ALBUMIN SERPL-MCNC: 4 G/DL (ref 3.5–5.2)
ALBUMIN/GLOB SERPL: 1.6 G/DL
ALP SERPL-CCNC: 81 U/L (ref 39–117)
ALT SERPL W P-5'-P-CCNC: 25 U/L (ref 1–33)
ANION GAP SERPL CALCULATED.3IONS-SCNC: 10 MMOL/L (ref 5–15)
APTT PPP: 26.5 SECONDS (ref 24–31)
AST SERPL-CCNC: 27 U/L (ref 1–32)
BASOPHILS # BLD AUTO: 0.1 10*3/MM3 (ref 0–0.2)
BASOPHILS NFR BLD AUTO: 0.6 % (ref 0–1.5)
BILIRUB SERPL-MCNC: 0.6 MG/DL (ref 0–1.2)
BUN SERPL-MCNC: 14 MG/DL (ref 8–23)
BUN/CREAT SERPL: 20 (ref 7–25)
CALCIUM SPEC-SCNC: 9.3 MG/DL (ref 8.6–10.5)
CHLORIDE SERPL-SCNC: 102 MMOL/L (ref 98–107)
CO2 SERPL-SCNC: 26 MMOL/L (ref 22–29)
CREAT SERPL-MCNC: 0.7 MG/DL (ref 0.57–1)
DEPRECATED RDW RBC AUTO: 44.2 FL (ref 37–54)
EGFRCR SERPLBLD CKD-EPI 2021: 96.7 ML/MIN/1.73
EOSINOPHIL # BLD AUTO: 0.2 10*3/MM3 (ref 0–0.4)
EOSINOPHIL NFR BLD AUTO: 1.3 % (ref 0.3–6.2)
ERYTHROCYTE [DISTWIDTH] IN BLOOD BY AUTOMATED COUNT: 13.7 % (ref 12.3–15.4)
GLOBULIN UR ELPH-MCNC: 2.5 GM/DL
GLUCOSE BLDC GLUCOMTR-MCNC: 111 MG/DL (ref 70–105)
GLUCOSE SERPL-MCNC: 111 MG/DL (ref 65–99)
HCT VFR BLD AUTO: 46.5 % (ref 34–46.6)
HGB BLD-MCNC: 15.2 G/DL (ref 12–15.9)
INR PPP: 0.99 (ref 0.93–1.1)
LYMPHOCYTES # BLD AUTO: 1.8 10*3/MM3 (ref 0.7–3.1)
LYMPHOCYTES NFR BLD AUTO: 14.2 % (ref 19.6–45.3)
MAGNESIUM SERPL-MCNC: 2.2 MG/DL (ref 1.6–2.4)
MCH RBC QN AUTO: 30.5 PG (ref 26.6–33)
MCHC RBC AUTO-ENTMCNC: 32.7 G/DL (ref 31.5–35.7)
MCV RBC AUTO: 93.3 FL (ref 79–97)
MONOCYTES # BLD AUTO: 0.8 10*3/MM3 (ref 0.1–0.9)
MONOCYTES NFR BLD AUTO: 6.5 % (ref 5–12)
NEUTROPHILS NFR BLD AUTO: 77.4 % (ref 42.7–76)
NEUTROPHILS NFR BLD AUTO: 9.8 10*3/MM3 (ref 1.7–7)
NRBC BLD AUTO-RTO: 0 /100 WBC (ref 0–0.2)
PHOSPHATE SERPL-MCNC: 3 MG/DL (ref 2.5–4.5)
PLAT MORPH BLD: NORMAL
PLATELET # BLD AUTO: 243 10*3/MM3 (ref 140–450)
PMV BLD AUTO: 8.1 FL (ref 6–12)
POTASSIUM SERPL-SCNC: 4 MMOL/L (ref 3.5–5.2)
PROT SERPL-MCNC: 6.5 G/DL (ref 6–8.5)
PROTHROMBIN TIME: 10.8 SECONDS (ref 9.6–11.7)
QT INTERVAL: 430 MS
QTC INTERVAL: 483 MS
RBC # BLD AUTO: 4.98 10*6/MM3 (ref 3.77–5.28)
RBC MORPH BLD: NORMAL
SODIUM SERPL-SCNC: 138 MMOL/L (ref 136–145)
WBC MORPH BLD: NORMAL
WBC NRBC COR # BLD: 12.6 10*3/MM3 (ref 3.4–10.8)

## 2023-10-04 PROCEDURE — 83735 ASSAY OF MAGNESIUM: CPT | Performed by: NURSE PRACTITIONER

## 2023-10-04 PROCEDURE — 82948 REAGENT STRIP/BLOOD GLUCOSE: CPT

## 2023-10-04 PROCEDURE — 25010000002 ONDANSETRON PER 1 MG: Performed by: EMERGENCY MEDICINE

## 2023-10-04 PROCEDURE — 70450 CT HEAD/BRAIN W/O DYE: CPT

## 2023-10-04 PROCEDURE — 70250 X-RAY EXAM OF SKULL: CPT

## 2023-10-04 PROCEDURE — 80053 COMPREHEN METABOLIC PANEL: CPT | Performed by: NURSE PRACTITIONER

## 2023-10-04 PROCEDURE — 85025 COMPLETE CBC W/AUTO DIFF WBC: CPT | Performed by: NURSE PRACTITIONER

## 2023-10-04 PROCEDURE — 71045 X-RAY EXAM CHEST 1 VIEW: CPT

## 2023-10-04 PROCEDURE — 70486 CT MAXILLOFACIAL W/O DYE: CPT

## 2023-10-04 PROCEDURE — 84100 ASSAY OF PHOSPHORUS: CPT | Performed by: NURSE PRACTITIONER

## 2023-10-04 PROCEDURE — 85610 PROTHROMBIN TIME: CPT | Performed by: NURSE PRACTITIONER

## 2023-10-04 PROCEDURE — 25010000002 KETOROLAC TROMETHAMINE PER 15 MG: Performed by: EMERGENCY MEDICINE

## 2023-10-04 PROCEDURE — 0HQ1XZZ REPAIR FACE SKIN, EXTERNAL APPROACH: ICD-10-PCS | Performed by: EMERGENCY MEDICINE

## 2023-10-04 PROCEDURE — 72125 CT NECK SPINE W/O DYE: CPT

## 2023-10-04 PROCEDURE — 85730 THROMBOPLASTIN TIME PARTIAL: CPT | Performed by: NURSE PRACTITIONER

## 2023-10-04 PROCEDURE — 99222 1ST HOSP IP/OBS MODERATE 55: CPT

## 2023-10-04 PROCEDURE — 85007 BL SMEAR W/DIFF WBC COUNT: CPT | Performed by: NURSE PRACTITIONER

## 2023-10-04 PROCEDURE — 99285 EMERGENCY DEPT VISIT HI MDM: CPT

## 2023-10-04 PROCEDURE — 93005 ELECTROCARDIOGRAM TRACING: CPT | Performed by: NURSE PRACTITIONER

## 2023-10-04 RX ORDER — LOSARTAN POTASSIUM 50 MG/1
100 TABLET ORAL
Status: DISCONTINUED | OUTPATIENT
Start: 2023-10-04 | End: 2023-10-06 | Stop reason: HOSPADM

## 2023-10-04 RX ORDER — SODIUM CHLORIDE 0.9 % (FLUSH) 0.9 %
10 SYRINGE (ML) INJECTION EVERY 12 HOURS SCHEDULED
Status: DISCONTINUED | OUTPATIENT
Start: 2023-10-04 | End: 2023-10-06 | Stop reason: HOSPADM

## 2023-10-04 RX ORDER — ONDANSETRON 2 MG/ML
4 INJECTION INTRAMUSCULAR; INTRAVENOUS ONCE
Status: COMPLETED | OUTPATIENT
Start: 2023-10-04 | End: 2023-10-04

## 2023-10-04 RX ORDER — NICOTINE 21 MG/24HR
1 PATCH, TRANSDERMAL 24 HOURS TRANSDERMAL
Status: DISCONTINUED | OUTPATIENT
Start: 2023-10-04 | End: 2023-10-04

## 2023-10-04 RX ORDER — NICOTINE 21 MG/24HR
1 PATCH, TRANSDERMAL 24 HOURS TRANSDERMAL DAILY
Status: DISCONTINUED | OUTPATIENT
Start: 2023-10-05 | End: 2023-10-06 | Stop reason: HOSPADM

## 2023-10-04 RX ORDER — SODIUM CHLORIDE 9 MG/ML
40 INJECTION, SOLUTION INTRAVENOUS AS NEEDED
Status: DISCONTINUED | OUTPATIENT
Start: 2023-10-04 | End: 2023-10-06 | Stop reason: HOSPADM

## 2023-10-04 RX ORDER — ATORVASTATIN CALCIUM 20 MG/1
20 TABLET, FILM COATED ORAL DAILY
Status: DISCONTINUED | OUTPATIENT
Start: 2023-10-04 | End: 2023-10-06 | Stop reason: HOSPADM

## 2023-10-04 RX ORDER — FLUOXETINE HYDROCHLORIDE 20 MG/1
80 CAPSULE ORAL DAILY
Status: DISCONTINUED | OUTPATIENT
Start: 2023-10-04 | End: 2023-10-06 | Stop reason: HOSPADM

## 2023-10-04 RX ORDER — KETOROLAC TROMETHAMINE 30 MG/ML
15 INJECTION, SOLUTION INTRAMUSCULAR; INTRAVENOUS ONCE
Status: COMPLETED | OUTPATIENT
Start: 2023-10-04 | End: 2023-10-04

## 2023-10-04 RX ORDER — HYDROCHLOROTHIAZIDE 12.5 MG/1
25 TABLET ORAL
Status: DISCONTINUED | OUTPATIENT
Start: 2023-10-04 | End: 2023-10-06 | Stop reason: HOSPADM

## 2023-10-04 RX ORDER — BUTALBITAL, ACETAMINOPHEN AND CAFFEINE 50; 325; 40 MG/1; MG/1; MG/1
2 TABLET ORAL EVERY 6 HOURS PRN
Status: DISCONTINUED | OUTPATIENT
Start: 2023-10-04 | End: 2023-10-06 | Stop reason: HOSPADM

## 2023-10-04 RX ORDER — SODIUM CHLORIDE 0.9 % (FLUSH) 0.9 %
10 SYRINGE (ML) INJECTION AS NEEDED
Status: DISCONTINUED | OUTPATIENT
Start: 2023-10-04 | End: 2023-10-06 | Stop reason: HOSPADM

## 2023-10-04 RX ORDER — LIDOCAINE HYDROCHLORIDE AND EPINEPHRINE 10; 10 MG/ML; UG/ML
5 INJECTION, SOLUTION INFILTRATION; PERINEURAL ONCE
Status: COMPLETED | OUTPATIENT
Start: 2023-10-04 | End: 2023-10-04

## 2023-10-04 RX ORDER — LIDOCAINE HYDROCHLORIDE AND EPINEPHRINE 10; 10 MG/ML; UG/ML
INJECTION, SOLUTION INFILTRATION; PERINEURAL
Status: COMPLETED
Start: 2023-10-04 | End: 2023-10-04

## 2023-10-04 RX ADMIN — BUTALBITAL, ACETAMINOPHEN, AND CAFFEINE 2 TABLET: 50; 325; 40 TABLET ORAL at 19:17

## 2023-10-04 RX ADMIN — KETOROLAC TROMETHAMINE 15 MG: 30 INJECTION, SOLUTION INTRAMUSCULAR; INTRAVENOUS at 14:31

## 2023-10-04 RX ADMIN — LIDOCAINE HYDROCHLORIDE AND EPINEPHRINE 5 ML: 10; 10 INJECTION, SOLUTION INFILTRATION; PERINEURAL at 15:23

## 2023-10-04 RX ADMIN — ONDANSETRON 4 MG: 2 INJECTION INTRAMUSCULAR; INTRAVENOUS at 14:31

## 2023-10-04 RX ADMIN — LIDOCAINE HYDROCHLORIDE,EPINEPHRINE BITARTRATE 5 ML: 10; .01 INJECTION, SOLUTION INFILTRATION; PERINEURAL at 15:23

## 2023-10-04 RX ADMIN — Medication 10 ML: at 21:20

## 2023-10-04 RX ADMIN — ATORVASTATIN CALCIUM 20 MG: 20 TABLET, FILM COATED ORAL at 21:16

## 2023-10-04 NOTE — ED PROVIDER NOTES
Subjective   History of Present Illness  Patient is a 62-year-old female who had slipped and fallen down some steps and struck her head.  Patient states she had no loss of consciousness but is nauseated.  Complains of pain to her head and her face.    Review of Systems    Past Medical History:   Diagnosis Date    Hyperlipidemia     Hypertension     Subarachnoid bleed 10/4/2023       No Known Allergies    Past Surgical History:   Procedure Laterality Date    AUGMENTATION MAMMAPLASTY Bilateral     CARDIAC CATHETERIZATION  20 years ago approx    I was in rehab, developed chest pain, sent to [facility], had a heart cath. There was some blockage but insignificant. No treatment.    CARDIAC CATHETERIZATION N/A 10/3/2023    Procedure: LEFT HEART CATH with possible PCI,radial;  Surgeon: Ranjit Crespo, ;  Location: [facility] CATH INVASIVE LOCATION;  Service: Cardiovascular;  Laterality: N/A;  Local and IV sedation  !!! RADIAL !!!       Family History   Problem Relation Age of Onset    Breast cancer Maternal Aunt         50's    Coronary artery disease Father     Heart attack Father          8 years ago, probable heart  80 years old    Hyperlipidemia Father     Hypertension Sister         90% circumflex artery stent placed ? Dr. Brady    Heart disease Sister     Hypertension Brother     Hyperlipidemia Brother     Parkinsonism Half-Brother        Social History     Socioeconomic History    Marital status:    Tobacco Use    Smoking status: Some Days     Packs/day: 0.25     Years: 0.50     Pack years: 0.13     Types: Cigarettes    Smokeless tobacco: Never    Tobacco comments:     intermittent smoking for 40 years   Vaping Use    Vaping Use: Never used   Substance and Sexual Activity    Alcohol use: Not Currently     Comment: sober for 12 years    Drug use: Never    Sexual activity: Yes     Partners: Male     Birth control/protection: Post-menopausal           Objective   Physical Exam  HEENT  exam shows the patient to have a 4 cm forehead laceration.  Pupils are equal and reactive to light.  TMs clear.  Neck is nontender.  Neurologic exam is nonfocal.  Chest is mildly tender.  Abdomen soft nontender.  Extremities M is unremarkable.  Procedures     Patient had her facial wound anesthetized with 1% lidocaine with epinephrine.  The wound was cleaned and closed under sterile prep drape using 5-0 nylon.  Sterile dressing was applied.  No foreign body was noted.      ED Course      Results for orders placed or performed in visit on 09/30/23   CBC (No Diff)    Specimen: Blood   Result Value Ref Range    WBC 9.00 3.40 - 10.80 10*3/mm3    RBC 5.12 3.77 - 5.28 10*6/mm3    Hemoglobin 15.9 12.0 - 15.9 g/dL    Hematocrit 47.7 (H) 34.0 - 46.6 %    MCV 93.2 79.0 - 97.0 fL    MCH 31.1 26.6 - 33.0 pg    MCHC 33.4 31.5 - 35.7 g/dL    RDW 13.9 12.3 - 15.4 %    RDW-SD 45.5 37.0 - 54.0 fl    MPV 8.1 6.0 - 12.0 fL    Platelets 278 140 - 450 10*3/mm3   Lipid Panel    Specimen: Blood   Result Value Ref Range    Total Cholesterol 211 (H) 0 - 200 mg/dL    Triglycerides 291 (H) 0 - 150 mg/dL    HDL Cholesterol 36 (L) 40 - 60 mg/dL    LDL Cholesterol  124 (H) 0 - 100 mg/dL    VLDL Cholesterol 51 (H) 5 - 40 mg/dL    LDL/HDL Ratio 3.24    Basic Metabolic Panel    Specimen: Blood   Result Value Ref Range    Glucose 83 65 - 99 mg/dL    BUN 18 8 - 23 mg/dL    Creatinine 0.74 0.57 - 1.00 mg/dL    Sodium 137 136 - 145 mmol/L    Potassium 3.9 3.5 - 5.2 mmol/L    Chloride 99 98 - 107 mmol/L    CO2 24.2 22.0 - 29.0 mmol/L    Calcium 9.5 8.6 - 10.5 mg/dL    BUN/Creatinine Ratio 24.3 7.0 - 25.0    Anion Gap 13.8 5.0 - 15.0 mmol/L    eGFR 90.5 >60.0 mL/min/1.73   Protime-INR    Specimen: Blood   Result Value Ref Range    Protime 10.8 9.6 - 11.7 Seconds    INR 0.99 0.93 - 1.10     CT Head Without Contrast    Result Date: 10/4/2023  Impression: Intracranial hemorrhage is present, with areas of presumed traumatic subarachnoid hemorrhage present  within the right sylvian fissure and along the right inferior frontal lobe which also demonstrates small hemorrhagic contusion. There is mild associated right-sided cerebral edema without global mass effect or midline shift. Acute right-sided facial bone fractures including nondisplaced right frontal bone fracture extending across the frontal sinus and involving the nasion as well as the orbital roof. There is also a minimally displaced acute fracture of the right lamina papyracea and tiny nondisplaced fracture of the right zygoma at its root. The orbits are otherwise normal without evidence of intraconal hemorrhage or mass effect. No acute fracture or traumatic malalignment of the cervical spine. Above findings relayed to the emergency room by Connor Cavazos via the epic messaging system at 2:57 p.m. 10/4/2023 Electronically Signed: Caden Cavazos MD  10/4/2023 2:58 PM EDT  Workstation ID: [id number]    CT Cervical Spine Without Contrast    Result Date: 10/4/2023  Impression: Intracranial hemorrhage is present, with areas of presumed traumatic subarachnoid hemorrhage present within the right sylvian fissure and along the right inferior frontal lobe which also demonstrates small hemorrhagic contusion. There is mild associated right-sided cerebral edema without global mass effect or midline shift. Acute right-sided facial bone fractures including nondisplaced right frontal bone fracture extending across the frontal sinus and involving the nasion as well as the orbital roof. There is also a minimally displaced acute fracture of the right lamina papyracea and tiny nondisplaced fracture of the right zygoma at its root. The orbits are otherwise normal without evidence of intraconal hemorrhage or mass effect. No acute fracture or traumatic malalignment of the cervical spine. Above findings relayed to the emergency room by Connor Cavazos via the epic messaging system at 2:57 p.m. 10/4/2023 Electronically Signed: Caden  MD Dirk  10/4/2023 2:58 PM EDT  Workstation ID: [id number]    CT Facial Bones Without Contrast    Result Date: 10/4/2023  Impression: Intracranial hemorrhage is present, with areas of presumed traumatic subarachnoid hemorrhage present within the right sylvian fissure and along the right inferior frontal lobe which also demonstrates small hemorrhagic contusion. There is mild associated right-sided cerebral edema without global mass effect or midline shift. Acute right-sided facial bone fractures including nondisplaced right frontal bone fracture extending across the frontal sinus and involving the nasion as well as the orbital roof. There is also a minimally displaced acute fracture of the right lamina papyracea and tiny nondisplaced fracture of the right zygoma at its root. The orbits are otherwise normal without evidence of intraconal hemorrhage or mass effect. No acute fracture or traumatic malalignment of the cervical spine. Above findings relayed to the emergency room by Connor Cavazos via the epic messaging system at 2:57 p.m. 10/4/2023 Electronically Signed: Caden Cavazos MD  10/4/2023 2:58 PM EDT  Workstation ID: [id number]                                        Medical Decision Making  My interpretation patient CT scan of her head without contrast shows right frontal intracerebral hemorrhage as well as subarachnoid hemorrhage.  Patient is noted to have multiple right facial fractures including her right frontal bone that extends through the sinus.  Laboratory data shows no anemia or other abnormality.    Amount and/or Complexity of Data Reviewed  Labs: ordered. Decision-making details documented in ED Course.  Radiology: ordered and independent interpretation performed.    Risk  Prescription drug management.  Decision regarding hospitalization.        Final diagnoses:   Traumatic right-sided intracerebral hemorrhage without loss of consciousness, initial encounter   Subarachnoid hemorrhage   Multiple  closed fractures of facial bone, initial encounter   Laceration of forehead, initial encounter   Fall, initial encounter       ED Disposition  ED Disposition       ED Disposition   Decision to Admit    Condition   --    Comment   Level of Care: Critical Care [6]   Diagnosis: Subarachnoid bleed [449699]   Admitting Physician: EMILIA NORIEGA [[id number]]   Attending Physician: EMILIA NORIEGA [[id number]]   Certification: I Certify That Inpatient Hospital Services Are Medically Necessary For Greater Than 2 Midnights                 No follow-up provider specified.       Medication List      No changes were made to your prescriptions during this visit.            Sabino Velez MD  10/04/23 1602

## 2023-10-04 NOTE — CONSULTS
Neurosurgery Consultation      Patient: Shanon Duvall    Sex: female    YOB: 1960    Date of Admission: 10/4/2023  2:18 PM    Admitting Dx: Subarachnoid bleed [I60.9]    Reason for Consult: Subarachnoid hemorrhage      Subjective     HPI:  Patient is a 62 y.o. female hypertension who presented to the ED today after suffering a fall down some stairs.  Patient struck her head but reports no loss of consciousness.  Neurosurgery was consulted due to intracranial hemorrhage seen on CT.    During my evaluation patient was awake, alert, and oriented.  Signs of craniofacial trauma secondary to her fall with repaired lacerations.  She reports pain associated with these areas as well as moderate headache.  She has no acute visual disturbance, outside of irritation from her right upper eyelid contusion.  She denies speech difficulties, lateralizing weakness, and clear rhinorrhea.      PMH:  Past Medical History:   Diagnosis Date    Hyperlipidemia     Hypertension     Subarachnoid bleed 10/4/2023         Current Facility-Administered Medications:     sodium chloride 0.9 % flush 10 mL, 10 mL, Intravenous, Q12H, Jerrod, Meagan, APRN    sodium chloride 0.9 % flush 10 mL, 10 mL, Intravenous, PRN, Jerrod, Meagan, APRN    sodium chloride 0.9 % infusion 40 mL, 40 mL, Intravenous, PRN, Jerrod, Meagan, APRN    Current Outpatient Medications:     ALPRAZolam (Xanax) 0.25 MG tablet, Take 1 tablet by mouth Every 8 (Eight) Hours As Needed for Anxiety for up to 30 doses., Disp: 30 tablet, Rfl: 0    aspirin 325 MG tablet, Take 1 tablet by mouth Daily., Disp: , Rfl:     atorvastatin (LIPITOR) 20 MG tablet, Take 1 tablet by mouth Daily., Disp: 90 tablet, Rfl: 3    Cholecalciferol (VITAMIN D) 50 MCG (2000 UT) tablet, Daily., Disp: , Rfl:     FLUoxetine (PROzac) 40 MG capsule, Take 2 capsules by mouth Daily., Disp: 180 capsule, Rfl: 1    losartan-hydrochlorothiazide (HYZAAR) 100-25 MG per tablet, Take 1 tablet by  mouth Daily., Disp: 90 tablet, Rfl: 3    Multiple Vitamins-Minerals (ZINC PO), Take  by mouth., Disp: , Rfl:     pantoprazole (PROTONIX) 40 MG EC tablet, Take 1 tablet by mouth Daily., Disp: , Rfl:     No Known Allergies    Past Surgical History:   Procedure Laterality Date    AUGMENTATION MAMMAPLASTY Bilateral     CARDIAC CATHETERIZATION  20 years ago approx    I was in rehab, developed chest pain, sent to [facility], had a heart cath. There was some blockage but insignificant. No treatment.    CARDIAC CATHETERIZATION N/A 10/3/2023    Procedure: LEFT HEART CATH with possible PCI,radial;  Surgeon: Ranjit Crespo DO;  Location: [facility] CATH INVASIVE LOCATION;  Service: Cardiovascular;  Laterality: N/A;  Local and IV sedation  !!! RADIAL !!!       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 Dr. Brady    Heart disease Sister     Hypertension Brother     Hyperlipidemia Brother     Parkinsonism Half-Brother          Current Medications:  Scheduled Meds:sodium chloride, 10 mL, Intravenous, Q12H      Continuous Infusions:   PRN Meds:   sodium chloride    sodium chloride    ROS: 14 point review of systems was completed and is negative  except for what is noted in HPI      Objective     Vitals:    10/04/23 1556 10/04/23 1601 10/04/23 1605 10/04/23 1611   BP:  127/70 126/71 108/75   BP Location:       Patient Position:       Pulse: 80   85   Resp:       Temp:       SpO2: 95%   99%   Weight:       Height:           Physical exam  General  - awake, cooperative, in no acute distress  HEENT  - Predominantly right-sided craniofacial trauma  - Right-sided forehead laceration, sutured  - Right periorbital ecchymosis and upper eyelid edema  - PERRLA, EOM intact  Cardiac  - RRR, no peripheral edema  Respiratory  - Normal respiratory rate and effort on room air  Skin  - Craniofacial trauma as above      NEUROLOGIC    MENTAL STATUS:  - A/O x3  - GCS 4-6-5  CRANIAL NERVES:  II:      Pupils equal and reactive  III, IV, VI: EOM intact, no gaze preference or deviation, no nystagmus.  V:      Normal sensation in V1, V2, and V3 segments bilaterally  VII:      No asymmetry, no nasolabial fold flattening  VIII:      Normal hearing to speech  IX, X:      Normal palatal elevation, no uvular deviation  XI:      5/5 head turn and 5/5 shoulder shrug bilaterally  XII:      Midline tongue protrusion  MOTOR:  - JEFFERY symmetrically  - 5/5 strength all 4 extremities  SENSORY:  - Normal to touch and pinprick, axial and peripheral  COORDINATION:  - No dysmetria  GAIT:  - Deferred            RESULTS REVIEW:  Results from last 7 days   Lab Units 09/30/23  1240   WBC 10*3/mm3 9.00   HEMOGLOBIN g/dL 15.9   HEMATOCRIT % 47.7*   PLATELETS 10*3/mm3 278        Results from last 7 days   Lab Units 09/30/23  1240   SODIUM mmol/L 137   POTASSIUM mmol/L 3.9   CHLORIDE mmol/L 99   CO2 mmol/L 24.2   BUN mg/dL 18   CREATININE mg/dL 0.74   CALCIUM mg/dL 9.5   GLUCOSE mg/dL 83           CT Cervical Spine Without Contrast    Result Date: 10/4/2023  CT FACIAL BONES WO CONTRAST, CT HEAD WO CONTRAST, CT CERVICAL SPINE WO CONTRAST Date of Exam: 10/4/2023 2:44 PM EDT Indication: Facial trauma.  Comparison: None available. Technique: Axial CT images were obtained of the head, face and cervical spine without contrast administration.  Sagittal and coronal reconstructions were performed.  Automated exposure control and iterative reconstruction methods were used. Findings: CT head: There is evidence of subarachnoid hemorrhage involving the right sylvian fissure extending along the anterior margin of the suprasellar cistern as well as involving the right frontal lobe, with some component of small contusion present involving  the inferior right frontal lobe. Mild right frontal cerebral edema is present, without mass effect. The ventricles are normal in size and configuration. The orbits appear normal. CT face: There is acute fracture present spanning the right frontal bone and orbital roof, with involvement of the anterior wall of the right frontal sinus with associated presumed hemorrhage within the frontal sinus. There is also likely a subtle acute fracture involving the right zygoma, best appreciated on axial image 46, nondisplaced. The right frontal fracture likely also extends to involve the right nasion. The pterygoid plates are intact. There is no evidence of mandibular fracture. There is no evidence of intraconal hemorrhage or mass effect. The lamina papyracea on the right appears to demonstrate minimal acute fracture best appreciated on image 45 with some adjacent blood products within the ethmoid sinuses. The superficial facial soft tissues demonstrate some edema along the right frontal region. The orbits are otherwise normal with the extraocular muscles appearing intact and globes symmetric. CT cervical spine: Cortical margins are intact and alignment is maintained, without evidence of acute fracture or traumatic malalignment. There is no suspicious lytic or sclerotic lesion. Minimal spondylosis changes are evident. The prevertebral soft tissues are normal. The dens is intact.     Impression:  Impression: Intracranial hemorrhage is present, with areas of presumed traumatic subarachnoid hemorrhage present within the right sylvian fissure and along the right inferior frontal lobe which also demonstrates small hemorrhagic contusion. There is mild associated right-sided cerebral edema without global mass effect or midline shift. Acute right-sided facial bone fractures including nondisplaced right frontal bone fracture extending across the frontal sinus and involving the nasion as well as the orbital roof. There is also a minimally displaced acute fracture of the right lamina papyracea and tiny nondisplaced fracture of the right zygoma at its root. The orbits are otherwise normal without evidence of intraconal hemorrhage or mass effect. No acute fracture or traumatic malalignment of the cervical spine. Above findings relayed to the emergency room by Connor Cavazos via the epic messaging system at 2:57 p.m. 10/4/2023 Electronically Signed: Caden Cavazos MD  10/4/2023 2:58 PM EDT  Workstation ID: [id number]      CT Head Without Contrast    Result Date: 10/4/2023  CT FACIAL BONES WO CONTRAST, CT HEAD WO CONTRAST, CT CERVICAL SPINE WO CONTRAST Date of Exam: 10/4/2023 2:44 PM EDT Indication: Facial trauma. Comparison: None available. Technique: Axial CT images were obtained of the head, face and cervical spine without contrast administration.  Sagittal and coronal reconstructions were performed.  Automated exposure control and iterative reconstruction methods were used. Findings: CT head: There is evidence of subarachnoid hemorrhage involving the right sylvian fissure extending along the anterior margin of the suprasellar cistern as well as involving the right frontal lobe, with some component of small contusion present involving  the inferior right frontal lobe. Mild right frontal cerebral edema is present, without mass effect. The ventricles are normal in size and configuration. The orbits appear normal. CT  face: There is acute fracture present spanning the right frontal bone and orbital roof, with involvement of the anterior wall of the right frontal sinus with associated presumed hemorrhage within the frontal sinus. There is also likely a subtle acute fracture involving the right zygoma, best appreciated on axial image 46, nondisplaced. The right frontal fracture likely also extends to involve the right nasion. The pterygoid plates are intact. There is no evidence of mandibular fracture. There is no evidence of intraconal hemorrhage or mass effect. The lamina papyracea on the right appears to demonstrate minimal acute fracture best appreciated on image 45 with some adjacent blood products within the ethmoid sinuses. The superficial facial soft tissues demonstrate some edema along the right frontal region. The orbits are otherwise normal with the extraocular muscles appearing intact and globes symmetric. CT cervical spine: Cortical margins are intact and alignment is maintained, without evidence of acute fracture or traumatic malalignment. There is no suspicious lytic or sclerotic lesion. Minimal spondylosis changes are evident. The prevertebral soft tissues are normal. The dens is intact.     Impression: Impression: Intracranial hemorrhage is present, with areas of presumed traumatic subarachnoid hemorrhage present within the right sylvian fissure and along the right inferior frontal lobe which also demonstrates small hemorrhagic contusion. There is mild associated right-sided cerebral edema without global mass effect or midline shift. Acute right-sided facial bone fractures including nondisplaced right frontal bone fracture extending across the frontal sinus and involving the nasion as well as the orbital roof. There is also a minimally displaced acute fracture of the right lamina papyracea and tiny nondisplaced fracture of the right zygoma at its root. The orbits are otherwise normal without evidence of intraconal  hemorrhage or mass effect. No acute fracture or traumatic malalignment of the cervical spine. Above findings relayed to the emergency room by Connor Cavazos via the epic messaging system at 2:57 p.m. 10/4/2023 Electronically Signed: Caden Cavazos MD  10/4/2023 2:58 PM EDT  Workstation ID: [id number]            Assessment     MDM: This is a 62 y.o. female who presents with posttraumatic right frontal subarachnoid hemorrhage and intraparenchymal hematoma secondary to head trauma after falling down some stairs.  She also has a nondisplaced skull fracture involving the right frontal bone, frontal sinus, and orbital roof.  She is neurologically intact and hemodynamically stable.    Patient does not require urgent/emergent neurosurgical intervention at this time.  We will repeat CT head 6 hours from the initial image.  She should be admitted to the ICU for hourly neurochecks, tight blood pressure control, and medical management.  Stat CT head for any acute neurologic decline.  We will also continue to monitor for development of CSF rhinorrhea given the presence of these acute skull base fractures.          Plan     Posttraumatic subarachnoid hemorrhage  Right frontal lobe intraparenchymal hemorrhage  - Repeat CT head 6 hours from initial image  - SBP <150  - Hourly neurochecks  - Avoid anticoagulant and platelet inhibiting medications  - Avoid sedating medications while on hourly neurochecks      Fall, initial counter  Nondisplaced skull base fractures  - Pain control per primary team; avoid heavily sedating medications  - Notify neurosurgery if clear/watery rhinorrhea develops    Hypertension  - SBP <150    Medical management and pain control per primary team.  Neurosurgery will continue to follow closely.  Please call with any questions or concerns.    I discussed my assessment and recommendations with Dr. Blackwood, Dr. Velez, the patient and her family members at bedside      Jim Solitario PA-C  10/4/2023  16:33  EDT      Part of this note may be an electronic transcription/translation of spoken language to printed text using the Dragon Dictation System.

## 2023-10-04 NOTE — Clinical Note
Level of Care: Critical Care [6]   Diagnosis: Subarachnoid bleed [[id number]]   Admitting Physician: EMILIA NORIEGA [[id number]]   Attending Physician: EMILIA NORIEGA [[id number]]   Certification: I Certify That Inpatient Hospital Services Are Medically Necessary For Greater Than 2 Midnights

## 2023-10-04 NOTE — H&P
Critical Care History and Physical     Shanon Duvall : 1960 MRN:[id number] LOS:0 ROOM: 2308/1     Reason for admission: Subarachnoid bleed     Assessment / Plan     Posttraumatic subarachnoid hemorrhage  Right frontal lobe intraparenchymal hemorrhage  Fall, initial counter  Nondisplaced skull base fracture  -Repeat CT head 6 hours from initial image   -SBP <150  -Hourly neurochecks  -Avoid anticoagulant and platelet inhibiting medications; no ASA   -Pain control per primary team; avoid heavily sedating medications, until hourly neurochecks can be changed  -Neurosurgery Following  -Monitor for any sings of clear rhinorrhea, which could be indicative of CSF leak and notify neurosurgery   -Notify neurosurgery if clear/watery rhinorrhea develops    Essential hypertension  -Continue home hydrochlorothiazide  -Continue home losartan    Hyperlipidemia  -Continue home Lipitor    Vitamin D deficiency  -Continue home cholecalciferol    Reflux disease  -PPI    Tobacco use  -Nicotine patch applied  -Currently smokes 0.25 pack a day  -Counseled on lifestyle modification and smoking cessation     Code Status (Patient has no pulse and is not breathing): CPR (Attempt to Resuscitate)  Medical Interventions (Patient has pulse or is breathing): Full Support       Nutrition:   NPO Diet NPO Type: Strict NPO     DVT prophylaxis:  Mechanical DVT prophylaxis orders are present.     History of Present illness     Shanon Duvall is a 62 y.o. female with PMH of hypertension, hyperlipidemia, vitamin D deficiency, GERD who presented after falling at home, and was admitted with a principal diagnosis of Subarachnoid bleed.  She has slipped and fallen down some steps of her stairway and struck her head.  She has not lost consciousness but has been nauseated since.  Complains of pain to her neck and her head.  Patient had a 4 cm forehead laceration, which was cleaned and had, and had closed with 5-0 nylon.  There was no foreign body noted  "in the wound.      ED course: In the emergency room patient underwent CT of head, facial bones, cervical spine, and skull.  Which showed \"Intracranial hemorrhage is present, with areas of presumed traumatic subarachnoid hemorrhage present within the right sylvian fissure and along the right inferior frontal lobe which also demonstrates small hemorrhagic contusion. There is mild associated right-sided cerebral edema without global mass effect or midline shift. Acute right-sided facial bone fractures including nondisplaced right frontal bone fracture extending across the frontal sinus and involving the nasion as well as the orbital roof. There is also a minimally displaced acute fracture of the right lamina papyracea and tiny nondisplaced fracture of the right zygoma at its root. The orbits are otherwise normal without evidence of intraconal hemorrhage or mass effect.  No acute fracture or traumatic malalignment of the cervical spine\".    ACP: Patient does not have advanced directive on file at this facility, however she is alert and oriented x4 and declares herself full code.    Patient was seen and examined on 10/04/23 at 19:39 EDT .    Subjective / Review of systems     Review of Systems   Constitutional:  Negative for chills and fever.   HENT:  Negative for congestion and sore throat.    Respiratory:  Negative for cough and shortness of breath.    Cardiovascular:  Negative for chest pain and palpitations.   Gastrointestinal:  Negative for abdominal pain and nausea.   Endocrine: Negative for heat intolerance and polyuria.   Genitourinary:  Negative for dysuria and urgency.   Musculoskeletal:  Negative for arthralgias and myalgias.   Skin:  Negative for rash and wound.   Neurological:  Positive for headaches. Negative for weakness.   Psychiatric/Behavioral:  Negative for suicidal ideas. The patient is not nervous/anxious.       Past Medical/Surgical/Social/Family History & Allergies     Past Medical History: "   Diagnosis Date    Hyperlipidemia     Hypertension     Subarachnoid bleed 10/4/2023      Past Surgical History:   Procedure Laterality Date    AUGMENTATION MAMMAPLASTY Bilateral     CARDIAC CATHETERIZATION  20 years ago approx    I was in rehab, developed chest pain, sent to [facility], had a heart cath. There was some blockage but insignificant. No treatment.    CARDIAC CATHETERIZATION N/A 10/3/2023    Procedure: LEFT HEART CATH with possible PCI,radial;  Surgeon: Ranjit Crespo DO;  Location: [facility] CATH INVASIVE LOCATION;  Service: Cardiovascular;  Laterality: N/A;  Local and IV sedation  !!! RADIAL !!!      Social History     Socioeconomic History    Marital status:    Tobacco Use    Smoking status: Some Days     Packs/day: 0.25     Years: 0.50     Pack years: 0.13     Types: Cigarettes    Smokeless tobacco: Never    Tobacco comments:     intermittent smoking for 40 years   Vaping Use    Vaping Use: Never used   Substance and Sexual Activity    Alcohol use: Not Currently     Comment: sober for 12 years    Drug use: Never    Sexual activity: Yes     Partners: Male     Birth control/protection: Post-menopausal      Family History   Problem Relation Age of Onset    Breast cancer Maternal Aunt         50's    Coronary artery disease Father     Heart attack Father          8 years ago, probable heart  80 years old    Hyperlipidemia Father     Hypertension Sister         90% circumflex artery stent placed ? Dr. Brady    Heart disease Sister     Hypertension Brother     Hyperlipidemia Brother     Parkinsonism Half-Brother       No Known Allergies   Social Determinants of Health     Tobacco Use: High Risk    Smoking Tobacco Use: Some Days    Smokeless Tobacco Use: Never    Passive Exposure: Not on file   Alcohol Use: Not At Risk    Frequency of Alcohol Consumption: Never    Average Number of Drinks: Patient does not drink    Frequency of Binge Drinking: Never   Financial Resource  Strain: Not on file   Food Insecurity: Not on file   Transportation Needs: Not on file   Physical Activity: Not on file   Stress: Not on file   Social Connections: Not on file   Intimate Partner Violence: Not on file   Depression: Not on file   Housing Stability: Not on file        Home Medications     Prior to Admission medications    Medication Sig Start Date End Date Taking? Authorizing Provider   ALPRAZolam (Xanax) 0.25 MG tablet Take 1 tablet by mouth Every 8 (Eight) Hours As Needed for Anxiety for up to 30 doses. 5/12/22   Shay Espinoza MD   aspirin 325 MG tablet Take 1 tablet by mouth Daily.    Olegario Green MD   atorvastatin (LIPITOR) 20 MG tablet Take 1 tablet by mouth Daily. 10/3/23   Ranjit Crespo,    Cholecalciferol (VITAMIN D) 50 MCG (2000 UT) tablet Daily. 10/26/18   Olegario Green MD   FLUoxetine (PROzac) 40 MG capsule Take 2 capsules by mouth Daily. 8/30/23   Shay Espinoza MD   losartan-hydrochlorothiazide (HYZAAR) 100-25 MG per tablet Take 1 tablet by mouth Daily. 7/10/23   Shay Espinoza MD   Multiple Vitamins-Minerals (ZINC PO) Take  by mouth.    Olegario Green MD   pantoprazole (PROTONIX) 40 MG EC tablet Take 1 tablet by mouth Daily.    Olegario Green MD        Objective / Physical Exam     Vital signs:  Temp: 98.5 °F (36.9 °C)  BP: 134/77  Heart Rate: 82  Resp: 12  SpO2: 98 %  Weight: 68.4 kg (150 lb 12.7 oz)    Admission Weight: Weight: 68.4 kg (150 lb 12.7 oz)    Physical Exam  Constitutional:       Appearance: She is normal weight.   HENT:      Head: Normocephalic. Right periorbital erythema present.      Jaw: Swelling present.      Comments: Sutured in ED     Right Ear: Hearing and tympanic membrane normal.      Left Ear: Hearing and tympanic membrane normal.      Nose: Nose normal.      Mouth/Throat:      Mouth: Mucous membranes are moist.   Eyes:      General:         Left eye: Discharge present.     Comments: Unable to assess  EOM on right eye, as it is bruised an almost completely swollen shut; Normal EOM on left eye   Cardiovascular:      Rate and Rhythm: Normal rate and regular rhythm.      Pulses: Normal pulses.      Heart sounds: Normal heart sounds.   Pulmonary:      Effort: Pulmonary effort is normal.      Breath sounds: Decreased breath sounds present.   Abdominal:      General: Abdomen is flat. Bowel sounds are normal.      Palpations: Abdomen is soft.   Musculoskeletal:         General: Normal range of motion.   Skin:     General: Skin is warm.      Coloration: Skin is jaundiced and pale.   Neurological:      General: No focal deficit present.      Mental Status: She is alert.   Psychiatric:         Mood and Affect: Mood normal.         Behavior: Behavior normal.        Labs     Results from last 7 days   Lab Units 09/30/23  1240   WBC 10*3/mm3 9.00   HEMATOCRIT % 47.7*   PLATELETS 10*3/mm3 278        Results from last 7 days   Lab Units 09/30/23  1240   SODIUM mmol/L 137   POTASSIUM mmol/L 3.9   CHLORIDE mmol/L 99   CO2 mmol/L 24.2   BUN mg/dL 18   CREATININE mg/dL 0.74          Imaging     Chest X ray: My independent assessment showed no infiltrates or effusions    EKG: Pending at time of note    Current Medications     Scheduled Meds:  atorvastatin, 20 mg, Oral, Daily  FLUoxetine, 80 mg, Oral, Daily  losartan, 100 mg, Oral, Q24H   And  hydroCHLOROthiazide, 25 mg, Oral, Q24H  nicotine, 1 patch, Transdermal, Q24H  sodium chloride, 10 mL, Intravenous, Q12H         Continuous Infusions:      Patient continues to be critically ill, remains at risk of clinical deterioration or death and needed high complexity decision making. I have spent a total of 35 minutes providing critical care services to this patient including but not limited to: review of labs/ microbiology/imaging/medications, serial monitoring of vital signs, review of other consultant's notes, review of events in the last 24 hrs, monitoring input/output, review of  treatment plan with bedside nurse, RT and other treatment team, management of life support and nutrition needs. I also spoke with patient  about the plan of care and answered all questions.    Time spent in performing separately billable procedures and updating family is not included in the critical care time.       Electronically signed by GEORGE Mari,  10/04/23  7:39 PM EDT.

## 2023-10-05 ENCOUNTER — APPOINTMENT (OUTPATIENT)
Dept: CT IMAGING | Facility: HOSPITAL | Age: 63
DRG: 087 | End: 2023-10-05
Payer: COMMERCIAL

## 2023-10-05 LAB
AMPHET+METHAMPHET UR QL: NEGATIVE
BARBITURATES UR QL SCN: POSITIVE
BENZODIAZ UR QL SCN: NEGATIVE
CANNABINOIDS SERPL QL: NEGATIVE
CHOLEST SERPL-MCNC: 168 MG/DL (ref 0–200)
COCAINE UR QL: NEGATIVE
GLUCOSE BLDC GLUCOMTR-MCNC: 115 MG/DL (ref 70–105)
HBA1C MFR BLD: 5.1 % (ref 4.8–5.6)
HDLC SERPL-MCNC: 36 MG/DL (ref 40–60)
LDLC SERPL CALC-MCNC: 107 MG/DL (ref 0–100)
LDLC/HDLC SERPL: 2.89 {RATIO}
METHADONE UR QL SCN: NEGATIVE
OPIATES UR QL: NEGATIVE
OXYCODONE UR QL SCN: NEGATIVE
TRIGL SERPL-MCNC: 140 MG/DL (ref 0–150)
VLDLC SERPL-MCNC: 25 MG/DL (ref 5–40)

## 2023-10-05 PROCEDURE — 80307 DRUG TEST PRSMV CHEM ANLYZR: CPT | Performed by: NURSE PRACTITIONER

## 2023-10-05 PROCEDURE — 70450 CT HEAD/BRAIN W/O DYE: CPT

## 2023-10-05 PROCEDURE — 83036 HEMOGLOBIN GLYCOSYLATED A1C: CPT

## 2023-10-05 PROCEDURE — 97161 PT EVAL LOW COMPLEX 20 MIN: CPT

## 2023-10-05 PROCEDURE — 80061 LIPID PANEL: CPT

## 2023-10-05 PROCEDURE — 97165 OT EVAL LOW COMPLEX 30 MIN: CPT

## 2023-10-05 PROCEDURE — 82948 REAGENT STRIP/BLOOD GLUCOSE: CPT

## 2023-10-05 PROCEDURE — 25010000002 ONDANSETRON PER 1 MG: Performed by: NURSE PRACTITIONER

## 2023-10-05 PROCEDURE — 99232 SBSQ HOSP IP/OBS MODERATE 35: CPT

## 2023-10-05 PROCEDURE — 36415 COLL VENOUS BLD VENIPUNCTURE: CPT

## 2023-10-05 RX ORDER — ONDANSETRON 2 MG/ML
4 INJECTION INTRAMUSCULAR; INTRAVENOUS EVERY 6 HOURS PRN
Status: DISCONTINUED | OUTPATIENT
Start: 2023-10-05 | End: 2023-10-06 | Stop reason: HOSPADM

## 2023-10-05 RX ADMIN — LOSARTAN POTASSIUM 100 MG: 50 TABLET, FILM COATED ORAL at 09:40

## 2023-10-05 RX ADMIN — BUTALBITAL, ACETAMINOPHEN, AND CAFFEINE 2 TABLET: 50; 325; 40 TABLET ORAL at 17:21

## 2023-10-05 RX ADMIN — FLUOXETINE 80 MG: 20 CAPSULE ORAL at 09:40

## 2023-10-05 RX ADMIN — Medication 5000 UNITS: at 09:40

## 2023-10-05 RX ADMIN — HYDROCHLOROTHIAZIDE 25 MG: 12.5 TABLET ORAL at 09:40

## 2023-10-05 RX ADMIN — Medication 10 ML: at 20:15

## 2023-10-05 RX ADMIN — NICOTINE 1 PATCH: 14 PATCH, EXTENDED RELEASE TRANSDERMAL at 09:40

## 2023-10-05 RX ADMIN — BUTALBITAL, ACETAMINOPHEN, AND CAFFEINE 2 TABLET: 50; 325; 40 TABLET ORAL at 01:41

## 2023-10-05 RX ADMIN — BUTALBITAL, ACETAMINOPHEN, AND CAFFEINE 2 TABLET: 50; 325; 40 TABLET ORAL at 23:41

## 2023-10-05 RX ADMIN — Medication 10 ML: at 09:40

## 2023-10-05 RX ADMIN — ONDANSETRON 4 MG: 2 INJECTION INTRAMUSCULAR; INTRAVENOUS at 01:41

## 2023-10-05 RX ADMIN — BUTALBITAL, ACETAMINOPHEN, AND CAFFEINE 2 TABLET: 50; 325; 40 TABLET ORAL at 09:40

## 2023-10-05 RX ADMIN — ATORVASTATIN CALCIUM 20 MG: 20 TABLET, FILM COATED ORAL at 09:40

## 2023-10-05 NOTE — PLAN OF CARE
Goal Outcome Evaluation:  Plan of Care Reviewed With: patient, spouse        Progress: improving  Outcome Evaluation: 63 yo female adm 10/4/23 following fall at home. Pt and  report she was carrying a cat carrier down a flight of steps from loft in barn when she fell. Suffered SAH (now noted to be stable per CT), as well as non-displaced fx's of the R frontal bone, frontal sinus, and orbital bone. PMH: cad, remote hx of etoh (sober x 12 yrs). At baseline, pt is completely independent, and works as RN at [employer]. Lives w/  in single level function home w/ no stairs to enter. Pt reports that there are stairs at home, but she does not have to do them. Today, pt is alert and oriented x 4. She has extensive bruising throughout the R side of the face, particularly in the periorbital region. Her R eye is nearly swollen shut. Pt has normal strength. Comes to sit, stand, and ambulates 400 ft w/o any gait deviations or balance deficits. Scores perfect 28/28 on Tinetti balance scale. No need for skilled PT at this time. Recommend home w/  when stable. Will sign off.

## 2023-10-05 NOTE — THERAPY EVALUATION
Patient Name: Shanon Duvall  : 1960    MRN: [id number]                              Today's Date: 10/5/2023       Admit Date: 10/4/2023    Visit Dx:     ICD-10-CM ICD-9-CM   1. Traumatic right-sided intracerebral hemorrhage without loss of consciousness, initial encounter  S06.340A 853.01   2. Subarachnoid hemorrhage  I60.9 430   3. Multiple closed fractures of facial bone, initial encounter  S02.92XA 802.8   4. Laceration of forehead, initial encounter  S01.81XA 873.42   5. Fall, initial encounter  W19.XXXA E888.9     Patient Active Problem List   Diagnosis    Alcoholism    Gastroesophageal reflux disease    Hypertension    Vitamin D deficiency    Preventative health care    Screening for cervical cancer    Atherosclerosis of native coronary artery of native heart without angina pectoris    Subarachnoid bleed     Past Medical History:   Diagnosis Date    Hyperlipidemia     Hypertension     Subarachnoid bleed 10/4/2023     Past Surgical History:   Procedure Laterality Date    AUGMENTATION MAMMAPLASTY Bilateral     CARDIAC CATHETERIZATION  20 years ago approx    I was in rehab, developed chest pain, sent to [facility], had a heart cath. There was some blockage but insignificant. No treatment.    CARDIAC CATHETERIZATION N/A 10/3/2023    Procedure: LEFT HEART CATH with possible PCI,radial;  Surgeon: Ranjit Crespo DO;  Location: [facility] CATH INVASIVE LOCATION;  Service: Cardiovascular;  Laterality: N/A;  Local and IV sedation  !!! RADIAL !!!      General Information       Row Name 10/05/23 1049          Physical Therapy Time and Intention    Document Type evaluation  -     Mode of Treatment physical therapy  -       Row Name 10/05/23 1049          General Information    Patient Profile Reviewed yes  -CM     Prior Level of Function independent:;community mobility;gait;ADL's;driving;shopping;work  works as RN at [employer]     Existing Precautions/Restrictions other (see comments)  must  keep SBP < 150  -CM       Row Name 10/05/23 1049          Living Environment    People in Home spouse   is retired RN  -CM       Row Name 10/05/23 1049          Home Main Entrance    Number of Stairs, Main Entrance none  -CM       Row Name 10/05/23 1049          Stairs Within Home, Primary    Stairs, Within Home, Primary can function on single level if needed  -CM     Number of Stairs, Within Home, Primary other (see comments)  -CM       Row Name 10/05/23 1049          Cognition    Orientation Status (Cognition) oriented x 4  -CM       Row Name 10/05/23 1049          Safety Issues, Functional Mobility    Impairments Affecting Function (Mobility) pain  -CM               User Key  (r) = Recorded By, (t) = Taken By, (c) = Cosigned By      Initials Name Provider Type    Carline Alejandro, PT Physical Therapist                   Mobility       Row Name 10/05/23 1050          Bed Mobility    Bed Mobility bed mobility (all) activities  -CM     All Activities, [place] (Bed Mobility) independent  -CM       Row Name 10/05/23 1050          Bed-Chair Transfer    Bed-Chair [place] (Transfers) supervision  -CM     Comment, (Bed-Chair Transfer) on/off bsc  -CM       Row Name 10/05/23 1050          Sit-Stand Transfer    Sit-Stand [place] (Transfers) independent  -CM     Assistive Device (Sit-Stand Transfers) other (see comments)  gait belt, non skid socks  -CM       Row Name 10/05/23 1050          Gait/Stairs (Locomotion)    [place] Level (Gait) independent  -CM     Assistive Device (Gait) other (see comments)  gait belt, non skid socks  -CM     Distance in Feet (Gait) 400 ft; no lateral sway, normal mandie, no evidence of any significant gait deviations.  -CM               User Key  (r) = Recorded By, (t) = Taken By, (c) = Cosigned By      Initials Name Provider Type    Carline Alejandro, PT Physical Therapist                   Obj/Interventions       Row Name 10/05/23 1051          Range of  Motion Comprehensive    General Range of Motion no range of motion deficits identified  -CM     Comment, General Range of Motion R facial bruising w/ edema of R eye, causing it to be nearly shut  -CM       Row Name 10/05/23 1051          Strength Comprehensive (MMT)    General Manual Muscle Testing (MMT) Assessment no strength deficits identified  -CM       Row Name 10/05/23 1051          Balance    Balance Assessment sitting static balance;sitting dynamic balance;standing dynamic balance;standing static balance  -CM     Static Sitting Balance independent  -CM     Dynamic Sitting Balance independent  -CM     Position, Sitting Balance unsupported;sitting edge of bed  -CM     Static Standing Balance independent  -CM     Dynamic Standing Balance independent  -CM     Position/Device Used, Standing Balance unsupported  -CM       Row Name 10/05/23 1051          Sensory Assessment (Somatosensory)    Sensory Assessment (Somatosensory) sensation intact  -CM               User Key  (r) = Recorded By, (t) = Taken By, (c) = Cosigned By      Initials Name Provider Type    Carline Alejandro, PT Physical Therapist                   Goals/Plan    No documentation.                  Clinical Impression       Row Name 10/05/23 1052          Pain    Pretreatment Pain Rating 2/10  -CM     Posttreatment Pain Rating 5/10  -CM     Pain Location - Side/Orientation Right  -CM     Pain Location - head  -CM     Pain Intervention(s) Ambulation/increased activity;Repositioned;Emotional support  -CM       Row Name 10/05/23 1057 10/05/23 1052       Plan of Care Review    Plan of Care Reviewed With -- patient;spouse  -CM    Progress -- improving  -CM    Outcome Evaluation 63 yo female adm 10/4/23 following fall at home. Pt and  report she was carrying a cat carrier down a flight of steps from loft in barn when she fell. Suffered SAH (now noted to be stable per CT), as well as non-displaced fx's of the R frontal bone, frontal sinus, and  orbital bone. PMH: cad, remote hx of etoh (sober x 12 yrs). At baseline, pt is completely independent, and works as RN at [employer]. Lives w/  in single level function home w/ no stairs to enter. Pt reports that there are stairs at home, but she does not have to do them. Today, pt is alert and oriented x 4. She has extensive bruising throughout the R side of the face, particularly in the periorbital region. Her R eye is nearly swollen shut. Pt has normal strength. Comes to sit, stand, and ambulates 400 ft w/o any gait deviations or balance deficits. Scores perfect 28/28 on Tinetti balance scale. No need for skilled PT at this time. Recommend home w/  when stable. Will sign off.  -CM 61 yo female adm 10/4/23 following fall at home. Pt and  report she was carrying a cat carrier down a flight of steps from loft in barn when she fell. Suffered SAH (now noted to be stable per CT), as well as non-displaced fx's of the R frontal bone, frontal sinus, and orbital bone. PMH: cad, remote hx of etoh (sober x 12 yrs). At baseline, pt is completely independent, and works as RN at [employer]. Lives w/  in single level function home w/ no stairs to enter. Pt reports that there are stairs at home, but she does not have to do them. Today, pt is alert and oriented x 4. She has extensive bruising throughout the R side of the face, particularly in the periorbital region. Her R eye is nearly swollen shut. Pt has normal strength. Comes to sit, stand, and ambulates 400 ft w/o any gait deviations or balance deficits.  -CM      Row Name 10/05/23 1051          Therapy Assessment/Plan (PT)    Criteria for Skilled Interventions Met (PT) no;no problems identified which require skilled intervention  -CM     Therapy Frequency (PT) evaluation only  -CM       Row Name 10/05/23 1056          Vital Signs    Pre Systolic BP Rehab 150  supine  -CM     Pre Treatment Diastolic BP 95  -CM     Intra Systolic BP Rehab 141  sitting on bsc prior  to amb  -CM     Intra Treatment Diastolic BP 94  -CM     Post Systolic BP Rehab 139  following amb  -CM     Post Treatment Diastolic BP 83  -CM     Pretreatment Heart Rate (beats/min) 93  -CM     Intratreatment Heart Rate (beats/min) 83  -CM     Posttreatment Heart Rate (beats/min) 81  -CM     Pre SpO2 (%) 95  -CM     O2 Delivery Pre Treatment room air  -CM     O2 Delivery Intra Treatment room air  -CM     Post SpO2 (%) 94  -CM     O2 Delivery Post Treatment room air  -CM       Row Name 10/05/23 1058          Positioning and Restraints    Pre-Treatment Position in bed  -CM     Post Treatment Position chair  -CM     In Chair notified nsg;sitting;call light within reach;encouraged to call for assist;with family/caregiver;legs elevated  -CM               User Key  (r) = Recorded By, (t) = Taken By, (c) = Cosigned By      Initials Name Provider Type    Carline Alejandro, PT Physical Therapist                   Outcome Measures       Row Name 10/05/23 1056          How much help from another person do you currently need...    Turning from your back to your side while in flat bed without using bedrails? 4  -CM     Moving from lying on back to sitting on the side of a flat bed without bedrails? 4  -CM     Moving to and from a bed to a chair (including a wheelchair)? 4  -CM     Standing up from a chair using your arms (e.g., wheelchair, bedside chair)? 4  -CM     Climbing 3-5 steps with a railing? 4  -CM     To walk in hospital room? 4  -CM     AM-PAC 6 Clicks Score (PT) 24  -CM     Highest level of mobility 8 --> Walked 250 feet or more  -CM       Row Name 10/05/23 1056          Modified [place] Scale    Pre-Stroke Modified [place] Scale 0 - No Symptoms at all.  -CM     Modified [place] Scale 0 - No Symptoms at all.  -CM       Row Name 10/05/23 1056          Tinetti Assessment    Tinetti Assessment yes  -CM     Sitting Balance 1  -CM     Arises 2  -CM     Attempts to Rise 2  -CM     Immediate Standing Balance (first 5 sec)  "2  -CM     Standing Balance 2  -CM     Sternal Nudge (feet close together) 2  -CM     Eyes Closed (feet close together) 1  -CM     Turning 360 Degrees- Steps 1  -CM     Turning 360 Degrees- Steadiness 1  -CM     Sitting Down 2  -CM     Tinetti Balance Score 16  -CM     Gait Initiation (immediate after told \"go\") 1  -CM     Step Length- Right Swing 1  -CM     Step Length- Left Swing 1  -CM     Foot Clearance- Right Foot 1  -CM     Foot Clearance- Left Foot 1  -CM     Step Symmetry 1  -CM     Step Continuity 1  -CM     Path (excursion) 2  -CM     Trunk 2  -CM     Base of Support 1  -CM     Gait Score 12  -CM     Tinetti Total Score 28  -CM       Row Name 10/05/23 1056          Functional Assessment    Outcome Measure Options Modified [place];AM-PAC 6 Clicks Basic Mobility (PT);Tinetti  -CM               User Key  (r) = Recorded By, (t) = Taken By, (c) = Cosigned By      Initials Name Provider Type    Carline Alejandro, PT Physical Therapist                                 Physical Therapy Education       Title: PT OT SLP Therapies (Done)       Topic: Physical Therapy (Done)       Point: Mobility training (Done)       Learning Progress Summary             Patient Acceptance, E,TB, VU,DU by  at 10/5/2023 1100   Significant Other Acceptance, E,TB, VU,DU by  at 10/5/2023 1100                                         User Key       Initials Effective Dates Name Provider Type Discipline     06/16/21 -  Carline Sanchez, PT Physical Therapist PT                  PT Recommendation and Plan     Plan of Care Reviewed With: patient, spouse  Progress: improving  Outcome Evaluation: 63 yo female adm 10/4/23 following fall at home. Pt and  report she was carrying a cat carrier down a flight of steps from [employer] in [place] when she fell. Suffered SAH (now noted to be stable per CT), as well as non-displaced fx's of the R frontal bone, frontal sinus, and orbital bone. PMH: cad, remote hx of etoh (sober x 12 yrs). At " baseline, pt is completely independent, and works as RN at [employer]. Lives w/  in single level function home w/ no stairs to enter. Pt reports that there are stairs at home, but she does not have to do them. Today, pt is alert and oriented x 4. She has extensive bruising throughout the R side of the face, particularly in the periorbital region. Her R eye is nearly swollen shut. Pt has normal strength. Comes to sit, stand, and ambulates 400 ft w/o any gait deviations or balance deficits. Scores perfect 28/28 on Tinetti balance scale. No need for skilled PT at this time. Recommend home w/  when stable. Will sign off.     Time Calculation:   PT Evaluation Complexity  History, PT Evaluation Complexity: no personal factors and/or comorbidities  Examination of Body Systems (PT Eval Complexity): total of 4 or more elements  Clinical Presentation (PT Evaluation Complexity): evolving  Clinical Decision Making (PT Evaluation Complexity): low complexity  Overall Complexity (PT Evaluation Complexity): low complexity     PT Charges       Row Name 10/05/23 1101             Time Calculation    Start Time 0855  -CM      Stop Time 0911  -CM      Time Calculation (min) 16 min  -CM      PT Received On 10/05/23  -CM         Time Calculation- PT    Total Timed Code Minutes- PT 0 minute(s)  -CM                User Key  (r) = Recorded By, (t) = Taken By, (c) = Cosigned By      Initials Name Provider Type    CM Carline Sanchez, PT Physical Therapist                  Therapy Charges for Today       Code Description Service Date Service Provider Modifiers Qty    [id number]  PT EVAL LOW COMPLEXITY 3 10/5/2023 Carline Sanchez, PT GP 1            PT G-Codes  Outcome Measure Options: Modified [place], AM-PAC 6 Clicks Basic Mobility (PT), Tinetti  AM-PAC 6 Clicks Score (PT): 24  Modified [place] Scale: 0 - No Symptoms at all.  Tinetti Total Score: 28  PT Discharge Summary  Anticipated Discharge Disposition (PT): home    Carline HASTINGS  Daniel, PT  10/5/2023

## 2023-10-05 NOTE — SIGNIFICANT NOTE
Patient is a downgrade from ICU for subarachnoid hemorrhage.  Patient examined and chart reviewed.  Patient is doing generally doing good today except for some headache which is controlled with the pain medications.  Her neurological examination as well as lung and heart examination is pretty normal.  One of my partners will be rounding on her tomorrow onwards.

## 2023-10-05 NOTE — THERAPY EVALUATION
Patient Name: Shanon Duvall  : 1960    MRN: [id number]                              Today's Date: 10/5/2023       Admit Date: 10/4/2023    Visit Dx:     ICD-10-CM ICD-9-CM   1. Traumatic right-sided intracerebral hemorrhage without loss of consciousness, initial encounter  S06.340A 853.01   2. Subarachnoid hemorrhage  I60.9 430   3. Multiple closed fractures of facial bone, initial encounter  S02.92XA 802.8   4. Laceration of forehead, initial encounter  S01.81XA 873.42   5. Fall, initial encounter  W19.XXXA E888.9     Patient Active Problem List   Diagnosis    Alcoholism    Gastroesophageal reflux disease    Hypertension    Vitamin D deficiency    Preventative health care    Screening for cervical cancer    Atherosclerosis of native coronary artery of native heart without angina pectoris    Subarachnoid bleed     Past Medical History:   Diagnosis Date    Hyperlipidemia     Hypertension     Subarachnoid bleed 10/4/2023     Past Surgical History:   Procedure Laterality Date    AUGMENTATION MAMMAPLASTY Bilateral     CARDIAC CATHETERIZATION  20 years ago approx    I was in rehab, developed chest pain, sent to [facility], had a heart cath. There was some blockage but insignificant. No treatment.    CARDIAC CATHETERIZATION N/A 10/3/2023    Procedure: LEFT HEART CATH with possible PCI,radial;  Surgeon: Ranjit Crespo DO;  Location: [facility] CATH INVASIVE LOCATION;  Service: Cardiovascular;  Laterality: N/A;  Local and IV sedation  !!! RADIAL !!!      General Information       Row Name 10/05/23 1138          OT Time and Intention    Document Type evaluation  -LS     Mode of Treatment occupational therapy  -LS       Row Name 10/05/23 1138          General Information    Patient Profile Reviewed yes  -LS     Prior Level of Function independent:;ADL's;driving;work;all household mobility;community mobility  -LS     Existing Precautions/Restrictions other (see comments)  SBP <150  -LS     Barriers  to Rehab none identified  -       Row Name 10/05/23 1138          Living Environment    People in Home spouse  -       Row Name 10/05/23 1138          Home Main Entrance    Number of Stairs, Main Entrance none  -       Row Name 10/05/23 1138          Cognition    Orientation Status (Cognition) oriented x 4  -               User Key  (r) = Recorded By, (t) = Taken By, (c) = Cosigned By      Initials Name Provider Type    Arnaldo Couch OT Occupational Therapist                     Mobility/ADL's       Row Name 10/05/23 1139          Bed Mobility    Bed Mobility bed mobility (all) activities  -     All Activities, Bath (Bed Mobility) independent  -       Row Name 10/05/23 1139          Transfers    Comment, (Transfers) Independent with transfers  -       Row Name 10/05/23 1139          Functional Mobility    Functional Mobility- Ind. Level independent  -       Row Name 10/05/23 1139          Activities of Daily Living    BADL Assessment/Intervention other (see comments)  Pt remains independent with ADLs  -               User Key  (r) = Recorded By, (t) = Taken By, (c) = Cosigned By      Initials Name Provider Type    Arnaldo Couch OT Occupational Therapist                   Obj/Interventions       Row Name 10/05/23 1139          Sensory Assessment (Somatosensory)    Sensory Assessment (Somatosensory) sensation intact  -       Row Name 10/05/23 1139          Range of Motion Comprehensive    General Range of Motion no range of motion deficits identified  -       Row Name 10/05/23 1139          Strength Comprehensive (MMT)    General Manual Muscle Testing (MMT) Assessment no strength deficits identified  -       Row Name 10/05/23 1139          Balance    Comment, Balance No functional balance deficits noted  -               User Key  (r) = Recorded By, (t) = Taken By, (c) = Cosigned By      Initials Name Provider Type    Arnaldo Couch OT Occupational Therapist                    Goals/Plan    No documentation.                  Clinical Impression       Row Name 10/05/23 1140          Pain Assessment    Pretreatment Pain Rating 2/10  -LS     Posttreatment Pain Rating 4/10  -LS     Pain Location - Side/Orientation Right  -LS     Pain Location - head  -LS       Row Name 10/05/23 1140          Plan of Care Review    Plan of Care Reviewed With patient;spouse  -     Outcome Evaluation Shanon Duvall is a 62 y.o. female with PMH of hypertension, hyperlipidemia, vitamin D deficiency, GERD who presented after falling down stairs at home while carrying a cat carrier, striking her head. She was admitted with a principal diagnosis of Subarachnoid bleed. Imaging shows posttraumatic subarachnoid hemorrhage, right frontal lobe intraparenchymal hemorrhage, and acute right-sided facial bone fractures including nondisplaced right frontal bone fracture extending across the frontal sinus and involving the nasion as well as the orbital roof. At baseline, pt is completely independent, and works as RN at [employer]. Lives w/  in single level function home w/ no stairs to enter. Pt reports that there are stairs at home, but she does not have to do them. Today, pt is alert and oriented x 4. Patient presents with normal strength and sensation, no coordination deficits, and overall remains IND with functional tasks including mobility. She has no continued need for acute skilled OT, thus will sign off.  -       Row Name 10/05/23 1140          Therapy Assessment/Plan (OT)    Criteria for Skilled Therapeutic Interventions Met (OT) no;no problems identified which require skilled intervention  -     Therapy Frequency (OT) evaluation only  -     Predicted Duration of Therapy Intervention (OT) eval only  -       Row Name 10/05/23 1140          Therapy Plan Review/Discharge Plan (OT)    Anticipated Discharge Disposition (OT) home  -       Row Name 10/05/23 1140          Vital Signs    Pre Systolic BP Rehab  150  -LS     Pre Treatment Diastolic BP 95  -LS     Intra Systolic BP Rehab 141  -LS     Intra Treatment Diastolic BP 94  -LS     Post Systolic BP Rehab 139  -LS     Post Treatment Diastolic BP 83  -LS     Post SpO2 (%) 95  -LS     Pre Patient Position Supine  -LS     Intra Patient Position Standing  -LS     Post Patient Position Sitting  -LS       Row Name 10/05/23 1140          Positioning and Restraints    Pre-Treatment Position in bed  -LS     Post Treatment Position chair  -LS     In Chair notified nsg;reclined;call light within reach;encouraged to call for assist;with family/caregiver  -               User Key  (r) = Recorded By, (t) = Taken By, (c) = Cosigned By      Initials Name Provider Type    LS Arnaldo Bloom OT Occupational Therapist                   Outcome Measures       Row Name 10/05/23 1143          How much help from another is currently needed...    Putting on and taking off regular lower body clothing? 4  -LS     Bathing (including washing, rinsing, and drying) 4  -LS     Toileting (which includes using toilet bed pan or urinal) 4  -LS     Putting on and taking off regular upper body clothing 4  -LS     Taking care of personal grooming (such as brushing teeth) 4  -LS     Eating meals 4  -LS     AM-PAC 6 Clicks Score (OT) 24  -LS       Row Name 10/05/23 1056          How much help from another person do you currently need...    Turning from your back to your side while in flat bed without using bedrails? 4  -CM     Moving from lying on back to sitting on the side of a flat bed without bedrails? 4  -CM     Moving to and from a bed to a chair (including a wheelchair)? 4  -CM     Standing up from a chair using your arms (e.g., wheelchair, bedside chair)? 4  -CM     Climbing 3-5 steps with a railing? 4  -CM     To walk in hospital room? 4  -CM     AM-PAC 6 Clicks Score (PT) 24  -CM     Highest level of mobility 8 --> Walked 250 feet or more  -CM       Row Name 10/05/23 1143 10/05/23 1056        Modified Shannon Scale    Pre-Stroke Modified Shannon Scale -- 0 - No Symptoms at all.  -CM    Modified Lavon Scale 0 - No Symptoms at all.  -LS 0 - No Symptoms at all.  -CM      Row Name 10/05/23 1143 10/05/23 1056       Functional Assessment    Outcome Measure Options AM-PAC 6 Clicks Daily Activity (OT);Modified Lavon  -LS Modified Lavon;AM-PAC 6 Clicks Basic Mobility (PT);Tinetti  -CM              User Key  (r) = Recorded By, (t) = Taken By, (c) = Cosigned By      Initials Name Provider Type    CM Carline Sanchez, PT Physical Therapist    Arnaldo Couch OT Occupational Therapist                    Occupational Therapy Education       Title: PT OT SLP Therapies (Done)       Topic: Occupational Therapy (Done)       Point: ADL training (Done)       Description:   Instruct learner(s) on proper safety adaptation and remediation techniques during self care or transfers.   Instruct in proper use of assistive devices.                  Learning Progress Summary             Patient Acceptance, E,TB, VU by  at 10/5/2023 1144                         Point: Precautions (Done)       Description:   Instruct learner(s) on prescribed precautions during self-care and functional transfers.                  Learning Progress Summary             Patient Acceptance, E,TB, VU by  at 10/5/2023 1144                                         User Key       Initials Effective Dates Name Provider Type Discipline     09/22/22 -  Arnaldo Bloom OT Occupational Therapist OT                  OT Recommendation and Plan  Therapy Frequency (OT): evaluation only  Plan of Care Review  Plan of Care Reviewed With: patient, spouse  Outcome Evaluation: Shanon Duvall is a 62 y.o. female with PMH of hypertension, hyperlipidemia, vitamin D deficiency, GERD who presented after falling down stairs at home while carrying a cat carrier, striking her head. She was admitted with a principal diagnosis of Subarachnoid bleed. Imaging shows posttraumatic  subarachnoid hemorrhage, right frontal lobe intraparenchymal hemorrhage, and acute right-sided facial bone fractures including nondisplaced right frontal bone fracture extending across the frontal sinus and involving the nasion as well as the orbital roof. At baseline, pt is completely independent, and works as RN at [employer]. Lives w/  in single level function home w/ no stairs to enter. Pt reports that there are stairs at home, but she does not have to do them. Today, pt is alert and oriented x 4. Patient presents with normal strength and sensation, no coordination deficits, and overall remains IND with functional tasks including mobility. She has no continued need for acute skilled OT, thus will sign off.     Time Calculation:         Time Calculation- OT       Row Name 10/05/23 1144             Time Calculation- OT    OT Start Time 0855  -LS      OT Stop Time 0912  -      OT Time Calculation (min) 17 min  -LS      OT Received On 10/05/23  -LS         Untimed Charges    OT Eval/Re-eval Minutes 17  -LS         Total Minutes    Untimed Charges Total Minutes 17  -LS       Total Minutes 17  -LS                User Key  (r) = Recorded By, (t) = Taken By, (c) = Cosigned By      Initials Name Provider Type     Arnaldo Bloom OT Occupational Therapist                  Therapy Charges for Today       Code Description Service Date Service Provider Modifiers Qty    13969950176  OT EVAL LOW COMPLEXITY 3 10/5/2023 Arnaldo Bloom OT GO 1                 Arnaldo Bloom OT  10/5/2023

## 2023-10-05 NOTE — PROGRESS NOTES
"  Neurosurgery Progress Note    Date: 10/05/23     Patient: Shanon Duvall   Sex: female   : 1960      SUBJECTIVE    Interval history:  No adverse events overnight.  Patient continues with mild headache but otherwise has no new complaints.  She did feel like may be here nose was running a little bit last night.  No clear rhinorrhea present.        Current Medications:  Scheduled Meds:atorvastatin, 20 mg, Oral, Daily  FLUoxetine, 80 mg, Oral, Daily  losartan, 100 mg, Oral, Q24H   And  hydroCHLOROthiazide, 25 mg, Oral, Q24H  nicotine, 1 patch, Transdermal, Daily  sodium chloride, 10 mL, Intravenous, Q12H  vitamin D3, 5,000 Units, Oral, Daily      Continuous Infusions:   PRN Meds:   butalbital-acetaminophen-caffeine    ondansetron    sodium chloride    sodium chloride      OBJECTIVE  Vitals:    10/05/23 0500 10/05/23 0600 10/05/23 0800 10/05/23 0900   BP: 127/80 134/83 128/87 150/95   BP Location:   Left arm    Patient Position:   Lying    Pulse: 74 71 70 89   Resp: 16 17 16    Temp:   98.3 °F (36.8 °C)    TempSrc:   Oral    SpO2: 92% 92% 94% 94%   Weight: 70.9 kg (156 lb 4.9 oz)      Height: 157.5 cm (62.01\")          Physical exam    General  - Awake, cooperative, in no acute distress  HEENT  - Stable craniofacial trauma, primarily right-sided  - PERRLA, EOM intact  - No CSF rhinorrhea expressed on exam  - Nasal vault has some dried blood but is otherwise unremarkable  Respiratory  - Normal respiratory rate and effort on room air  Skin  - Right periorbital ecchymosis, stable  - Right frontotemporal laceration, sutured  - Scattered areas of ecchymosis and abrasions secondary to trauma  NEUROLOGIC  - A/O x3, GCS 4-6-5  - CN II-XII grossly intact  - Moves all extremities symmetrically and w/ 5/5 strength  - Sensation intact throughout  - Negative pronator drift  - Normal finger-nose coordination      Results review  CBC          3/8/2023    14:07 2023    12:40 10/4/2023    20:24   CBC   WBC 8.32  9.00  " 12.60    RBC 4.85  5.12  4.98    Hemoglobin 15.1  15.9  15.2    Hematocrit 44.6  47.7  46.5    MCV 92.0  93.2  93.3    MCH 31.1  31.1  30.5    MCHC 33.9  33.4  32.7    RDW 12.4  13.9  13.7    Platelets 281  278  243        BMP          1/18/2023    09:01 9/30/2023    12:40 10/4/2023    20:24   BMP   BUN 18  18  14    Creatinine 0.77  0.74  0.70    Sodium 140  137  138    Potassium 4.1  3.9  4.0    Chloride 102  99  102    CO2 30.0  24.2  26.0    Calcium 9.6  9.5  9.3             CT Cervical Spine Without Contrast    Result Date: 10/4/2023  CT FACIAL BONES WO CONTRAST, CT HEAD WO CONTRAST, CT CERVICAL SPINE WO CONTRAST Date of Exam: 10/4/2023 2:44 PM EDT Indication: Facial trauma. Comparison: None available. Technique: Axial CT images were obtained of the head, face and cervical spine without contrast administration.  Sagittal and coronal reconstructions were performed.  Automated exposure control and iterative reconstruction methods were used. Findings: CT head: There is evidence of subarachnoid hemorrhage involving the right sylvian fissure extending along the anterior margin of the suprasellar cistern as well as involving the right frontal lobe, with some component of small contusion present involving  the inferior right frontal lobe. Mild right frontal cerebral edema is present, without mass effect. The ventricles are normal in size and configuration. The orbits appear normal. CT face: There is acute fracture present spanning the right frontal bone and orbital roof, with involvement of the anterior wall of the right frontal sinus with associated presumed hemorrhage within the frontal sinus. There is also likely a subtle acute fracture involving the right zygoma, best appreciated on axial image 46, nondisplaced. The right frontal fracture likely also extends to involve the right nasion. The pterygoid plates are intact. There is no evidence of mandibular fracture. There is no evidence of intraconal hemorrhage or  mass effect. The lamina papyracea on the right appears to demonstrate minimal acute fracture best appreciated on image 45 with some adjacent blood products within the ethmoid sinuses. The superficial facial soft tissues demonstrate some edema along the right frontal region. The orbits are otherwise normal with the extraocular muscles appearing intact and globes symmetric. CT cervical spine: Cortical margins are intact and alignment is maintained, without evidence of acute fracture or traumatic malalignment. There is no suspicious lytic or sclerotic lesion. Minimal spondylosis changes are evident. The prevertebral soft tissues are normal. The dens is intact.     Impression: Impression: Intracranial hemorrhage is present, with areas of presumed traumatic subarachnoid hemorrhage present within the right sylvian fissure and along the right inferior frontal lobe which also demonstrates small hemorrhagic contusion. There is mild associated right-sided cerebral edema without global mass effect or midline shift. Acute right-sided facial bone fractures including nondisplaced right frontal bone fracture extending across the frontal sinus and involving the nasion as well as the orbital roof. There is also a minimally displaced acute fracture of the right lamina papyracea and tiny nondisplaced fracture of the right zygoma at its root. The orbits are otherwise normal without evidence of intraconal hemorrhage or mass effect. No acute fracture or traumatic malalignment of the cervical spine. Above findings relayed to the emergency room by Connor Cavazos via the epic messaging system at 2:57 p.m. 10/4/2023 Electronically Signed: Caden Cavazos MD  10/4/2023 2:58 PM EDT  Workstation ID: [id number]      CT Head Without Contrast    Result Date: 10/4/2023  CT HEAD WO CONTRAST Date of Exam: 10/4/2023 8:46 PM EDT Indication: Follow-up for intracranial hemorrhage. Comparison: 10/4/2023 at 2:37 p.m. Technique: Axial CT images were obtained  of the head without contrast administration.  Coronal reconstructions were performed.  Automated exposure control and iterative reconstruction methods were used. Findings: The ventricles are normal in size and midline. There is no significant change in right-sided subarachnoid hemorrhage along the sylvian fissure extending along the anterior margin of the suprasellar cistern as well as involving the right frontal lobe. Also small focal bone contusion in the right orbitofrontal lobe appears stable. There is no midline shift Again noticed fracture of the right frontal bone extending into the orbital roof involving the anterior wall of the right frontal sinus.     Impression: Impression: No significant interval change in right-sided intracranial hemorrhage as above. Close follow-up recommended No change in acute fractures as above Electronically Signed: Fran Tobar MD  10/4/2023 9:09 PM EDT  Workstation ID: [id number]    CT Head Without Contrast    Result Date: 10/4/2023  CT FACIAL BONES WO CONTRAST, CT HEAD WO CONTRAST, CT CERVICAL SPINE WO CONTRAST Date of Exam: 10/4/2023 2:44 PM EDT Indication: Facial trauma. Comparison: None available. Technique: Axial CT images were obtained of the head, face and cervical spine without contrast administration.  Sagittal and coronal reconstructions were performed.  Automated exposure control and iterative reconstruction methods were used. Findings: CT head: There is evidence of subarachnoid hemorrhage involving the right sylvian fissure extending along the anterior margin of the suprasellar cistern as well as involving the right frontal lobe, with some component of small contusion present involving  the inferior right frontal lobe. Mild right frontal cerebral edema is present, without mass effect. The ventricles are normal in size and configuration. The orbits appear normal. CT face: There is acute fracture present spanning the right frontal bone and orbital roof, with  involvement of the anterior wall of the right frontal sinus with associated presumed hemorrhage within the frontal sinus. There is also likely a subtle acute fracture involving the right zygoma, best appreciated on axial image 46, nondisplaced. The right frontal fracture likely also extends to involve the right nasion. The pterygoid plates are intact. There is no evidence of mandibular fracture. There is no evidence of intraconal hemorrhage or mass effect. The lamina papyracea on the right appears to demonstrate minimal acute fracture best appreciated on image 45 with some adjacent blood products within the ethmoid sinuses. The superficial facial soft tissues demonstrate some edema along the right frontal region. The orbits are otherwise normal with the extraocular muscles appearing intact and globes symmetric. CT cervical spine: Cortical margins are intact and alignment is maintained, without evidence of acute fracture or traumatic malalignment. There is no suspicious lytic or sclerotic lesion. Minimal spondylosis changes are evident. The prevertebral soft tissues are normal. The dens is intact.     Impression: Impression: Intracranial hemorrhage is present, with areas of presumed traumatic subarachnoid hemorrhage present within the right sylvian fissure and along the right inferior frontal lobe which also demonstrates small hemorrhagic contusion. There is mild associated right-sided cerebral edema without global mass effect or midline shift. Acute right-sided facial bone fractures including nondisplaced right frontal bone fracture extending across the frontal sinus and involving the nasion as well as the orbital roof. There is also a minimally displaced acute fracture of the right lamina papyracea and tiny nondisplaced fracture of the right zygoma at its root. The orbits are otherwise normal without evidence of intraconal hemorrhage or mass effect. No acute fracture or traumatic malalignment of the cervical spine.  Above findings relayed to the emergency room by Connor Cavazos via the epic messaging system at 2:57 p.m. 10/4/2023 Electronically Signed: Caden Cavazos MD  10/4/2023 2:58 PM EDT  Workstation ID: [id number]          ASSESSMENT/PLAN  This is a 62 y.o. female being followed by neurosurgery for posttraumatic subarachnoid hemorrhage and intraparenchymal hemorrhage of the right frontal lobe.  She is neurologically and hemodynamically stable.  No signs of CSF rhinorrhea.  Repeat CT head shows no interval changes of intracranial hemorrhage.    Patient okay to transfer out of the ICU and onto neuro floor.  We will get a final repeat CT head tomorrow morning.  She is okay to eat and take p.o. medications from a neurosurgical standpoint.  We will continue routine neurochecks and blood pressure control as below.  If she remains clinically and radiographically stable then anticipate she may be okay to discharge as early as tomorrow morning.      - Okay to transfer to neuro floor  - Repeat CT head tomorrow morning  - Every 4 hour neurochecks  - SBP <160  - Okay to mobilize once cleared by PT/OT  - Tylenol or Fioricet for headache  - Okay for normal diet and p.o. meds from neurosurgical standpoint  - Call with any questions or concerns        I discussed my assessment and recommendations with Dr. Blackwood, the ICU nursing staff, the patient, and her spouse    Jim Solitario PA-C  10/05/23  09:21 EDT      Part of this note may be an electronic transcription/translation of spoken language to printed text using the Dragon Dictation System.

## 2023-10-05 NOTE — PLAN OF CARE
Goal Outcome Evaluation:    Pt remains on RA. No gtts.     Repeat head CT completed - see results. No neurological change over the course of the day. Pt continues to complain of headache - treated with PRN medication.     Bedrest DC today - pt up with PT and ad lavern.   Diet ordered.     Downgrade orders received.     Plan to discharge tomorrow.     VSS at this time.   Pt and family updated at bedside.

## 2023-10-05 NOTE — PLAN OF CARE
Goal Outcome Evaluation:  Plan of Care Reviewed With: patient, spouse           Outcome Evaluation: Shanon Duvall is a 62 y.o. female with PMH of hypertension, hyperlipidemia, vitamin D deficiency, GERD who presented after falling down stairs at home while carrying a cat carrier, striking her head. She was admitted with a principal diagnosis of Subarachnoid bleed. Imaging shows posttraumatic subarachnoid hemorrhage, right frontal lobe intraparenchymal hemorrhage, and acute right-sided facial bone fractures including nondisplaced right frontal bone fracture extending across the frontal sinus and involving the nasion as well as the orbital roof. At baseline, pt is completely independent, and works as RN at [employer]. Lives w/  in single level function home w/ no stairs to enter. Pt reports that there are stairs at home, but she does not have to do them. Today, pt is alert and oriented x 4. Patient presents with normal strength and sensation, no coordination deficits, and overall remains IND with functional tasks including mobility. She has no continued need for acute skilled OT, thus will sign off.      Anticipated Discharge Disposition (OT): home

## 2023-10-05 NOTE — PLAN OF CARE
24-hour interval CT head reviewed and is stable.  Patient has remained neurologically stable with no new complaints or focal deficits.  Headache is well controlled.  No evidence of CSF rhinorrhea at this time.  She remains hemodynamically stable.    From a neurosurgical standpoint patient is fine to discharge home.  I have repeatedly discussed with her and her family to watch for clear fluid draining/dripping from her nose as this may indicate CSF rhinorrhea and necessitate return to the hospital immediately.  Encouraged her to call our office with any questions or concerns in the interim.  We will have her follow-up with me in clinic in 2 weeks with a repeat CT head at that time for final interval evaluation.    Further medical management and pain control per primary team.  Neurosurgery will sign off at this time.    I discussed my assessment and recommendations with Dr. Blackwood and the ICU nursing staff    Jim Solitario PA-C  10/5/2023

## 2023-10-05 NOTE — CONSULTS
Diabetes Education    Patient Name:  Shanon Duvall  YOB: 1960  MRN: [id number]  Admit Date:  10/4/2023    Consult received per stroke protocol being ordered. Pt does not have hx of diabetes per problem list. Pt does not take diabetes medication at home and is not receiving diabetes medication in hospital. A1c this adm 5.1%. Diabetes education not needed at this time.         Electronically signed by:  Kim Craft RN  10/05/23 10:13 EDT

## 2023-10-05 NOTE — PLAN OF CARE
Goal Outcome Evaluation:  Plan of Care Reviewed With: patient, spouse        Progress: improving. Neurologically remains intact. No changes to neuro assessment this shift. Moving all extremities. Denies numbness and tingling. C/O dull throbbing headache. CT repeated as ordered with no changes. Neurosurgery notified via answering machine. Pt c/o nausea related to pain medication. Zofran administered. Call to Neurosurgery NP. Pt felt like nose was running. Scant drop of clear fluid in the base of left nare. [place] NP assessed and unable to see anything. Call to Neurosurgery NP. New orders noted.

## 2023-10-05 NOTE — PROGRESS NOTES
Critical Care Progress Note   Shanon Duvall : 1960 MRN:[id number] LOS:1     Principal Problem: Subarachnoid bleed     Reason for follow up: All the medical problems listed below    Summary     A 62 y.o. female admitted with a principal diagnosis of Subarachnoid bleed.      Significant events     10/05/23 : Afeb, VSS.  800 mL UOP since admission, net -380 mL.  WBC minimally elevated at 12.6k w/ 77% N.      Assessment / Plan     Posttraumatic subarachnoid hemorrhage  Right frontal lobe intraparenchymal hemorrhage  Fall, initial counter  Nondisplaced skull base fracture  -Repeat CT head last night was negative for further change.  Repeat CT pending today.   -SBP <150  -Avoid anticoagulant and platelet inhibiting medications; no ASA   -Neurosurgery following; no intervention planned       Pulmonary rales  Tobacco use  -Nicotine patch applied  -Currently smokes 0.25 pack a day  -Counseled on lifestyle modification and smoking cessation  -Hasn't received an excessive amt of fluids--recommend OP comprehensive PFT's      Essential hypertension  -Continue home hydrochlorothiazide  -Continue home losartan       Hyperlipidemia  -Continue home Lipitor       Vitamin D deficiency  -Continue home cholecalciferol       Reflux disease  -PPI           Dispo:  Downgrade to [facility].        Code status:   Code Status (Patient has no pulse and is not breathing): CPR (Attempt to Resuscitate)  Medical Interventions (Patient has pulse or is breathing): Full Support       Nutrition: Diet: Cardiac Diets; Healthy Heart (2-3 Na+); Texture: Regular Texture (IDDSI 7); Fluid Consistency: Thin (IDDSI 0)   Patient isn't on Tube Feeding    DVT prophylaxis:  Mechanical DVT prophylaxis orders are present.     Subjective / Review of systems     Review of Systems   Pt denies CP or SOB.  Denies f/c/s/n/v.  Has a H/A.  Denies asymmetric weakness, change in vision (other than from swelling on right eye).  Objective / Physical Exam   Vital signs:  Temp:  98.4 °F (36.9 °C)  BP: 128/82  Heart Rate: 69  Resp: 14  SpO2: 96 %  Weight: 70.9 kg (156 lb 4.9 oz)    Admission Weight: Weight: 68.4 kg (150 lb 12.7 oz)  Current Weight: Weight: 70.9 kg (156 lb 4.9 oz)    Input/Output in last 24 hours:    Intake/Output Summary (Last 24 hours) at 10/5/2023 1225  Last data filed at 10/5/2023 1105  Gross per 24 hour   Intake 420 ml   Output 800 ml   Net -380 ml      Physical Exam     GEN:  Pleasant.  Appears appropriate for stated age.  WD/WN/WH.  Sitting up in bed on RA.  NAD.  NEURO:  Brainstem reflexes intact.  No obvious focal deficit.  Moves all 4 ext.  HEENT:  PERRL.  MMM.  Oropharynx non-erythematous.  No drainage from the eyes/ears/nose.  No conjunctival petechiae.  No oral thrush.  Auditory and visual acuity grossly wnl.  Good dentition.  Voice normal.  Extensive facial and periorbital ecchymosis around R eye and forehead.    NECK:  Supple, NT, trachea midline.  No meningismus.  No ROM limitation.  No torticollis.  No JVD.     CHEST/LUNGS:  Breath sounds are noted for bibasilar crackles. Chest excursion equal bilaterally.    CARDIOVASCULAR:  RRR w/o murmur noted.    GI:  Abdomen soft, NT, ND, +BS.   :  Deferred.  EXTREMITIES:  No deformity or amputation.  No cyanosis, edema, or asymmetry.  Pulses 2+ and equal in BLE's.    SKIN:  Warm, dry, and pink.  No rash, breakdown, or track marks noted.  LYMPHATICS/HEME:  No overt LAD or abnormal bruising.  No lymphedema.  MSK:  Normal ROM.  No joint abnormalities noted.  Strength is 5/5 and equal in BUE and BLE's.  PSYCH:  Pleasant.  A&Ox 3.  Normal mood and affect.  Responds appropriately to commands and appears to comprehend instructions.          Radiology and Labs     Results from last 7 days   Lab Units 10/04/23  2024 09/30/23  1240   WBC 10*3/mm3 12.60* 9.00   HEMATOCRIT % 46.5 47.7*   PLATELETS 10*3/mm3 243 278      Results from last 7 days   Lab Units 10/04/23  2024 09/30/23  1240   SODIUM mmol/L 138 137   POTASSIUM mmol/L  4.0 3.9   CHLORIDE mmol/L 102 99   CO2 mmol/L 26.0 24.2   BUN mg/dL 14 18   CREATININE mg/dL 0.70 0.74      Current medications   Scheduled Meds: atorvastatin, 20 mg, Oral, Daily  FLUoxetine, 80 mg, Oral, Daily  losartan, 100 mg, Oral, Q24H   And  hydroCHLOROthiazide, 25 mg, Oral, Q24H  nicotine, 1 patch, Transdermal, Daily  sodium chloride, 10 mL, Intravenous, Q12H  vitamin D3, 5,000 Units, Oral, Daily      Continuous Infusions:      Plan discussed with RN. Reviewed all other data in the last 24 hours, including but not limited to vitals, labs, microbiology, imaging and pertinent notes from other providers.     Ranjit Jacobsen,    Critical Care  10/05/23   12:25 EDT

## 2023-10-05 NOTE — PAYOR COMM NOTE
"AUTHORIZATION PENDING:   PLEASE CALL OR FAX DETERMINATION TO CONTACT BELOW. THANK YOU.        Richa Barton RN MSN  /UR  [facility]  [phone] office  [phone] fax  [email or web address]    [facility]  NPI: [id number]  Tax: [phone]            Shanon Duvall (62 y.o. Female)       Date of Birth   1960    Social Security Number       Address   [address] [place] [address]    Home Phone   [phone]    MRN   [id number]       Latter day   Orthodox    Marital Status                               Admission Date   10/4/23    Admission Type   Emergency    Admitting Provider   Ranjit Jacobsen DO    Attending Provider   Ranjit Jacobsen DO    Department, Room/Bed   [facility] INTENSIVE CARE UNIT, 2308/1       Discharge Date       Discharge Disposition       Discharge Destination                                 Attending Provider: Ranjit Jacobsen DO    Allergies: No Known Allergies    Isolation: None   Infection: None   Code Status: CPR    Ht: 157.5 cm (62.01\")   Wt: 70.9 kg (156 lb 4.9 oz)    Admission Cmt: None   Principal Problem: Subarachnoid bleed [I60.9]                   Active Insurance as of 10/4/2023       Primary Coverage       Payor Plan Insurance Group Employer/Plan Group    [employer]HEM BLUE CROSS ANTHEM BLUE CROSS BLUE SHIELD PPO [id number]       Payor Plan Address Payor Plan Phone Number Payor Plan Fax Number Effective Dates    [address] [phone]  1/1/2022 - None Entered    [facility] [id number]         Subscriber Name Subscriber Birth Date Member ID       JACE DUVALL 3/30/1962 [id number]               Secondary Coverage       Payor Plan Insurance Group Employer/Plan Group    [facility]       Payor Plan Address Payor Plan Phone Number Payor Plan Fax Number Effective Dates    [id number]   1/1/2022 - None Entered    [place] [id number]    "      Subscriber Name Subscriber Birth Date Member ID       MYLA DUVALL 1960 [id number]                     Emergency Contacts        (Rel.) Home Phone Work Phone Mobile Phone    JACE DUVALL (Spouse) [phone] -- [phone]    Carmen Shultz (Daughter) -- -- [phone]          10/04/23 1608  Inpatient Admission  Once     Completed     Level of Care: Critical Care  Diagnosis: Subarachnoid bleed [732831]  Admitting Physician: RANJIT JACOBSEN [[id number]]  Attending Physician: RANJIT JACOBSEN [[id number]]  Certification: I Certify That Inpatient Hospital Services Are Medically Necessary For Greater Than 2 Midnights               History & Physical        Vivien Espinoza APRN at 10/04/23 1939       Attestation signed by Ranjit Jacobsen DO at 10/05/23 0820    I have reviewed this documentation and agree.                    Critical Care History and Physical     Myla Duvall : 1960 MRN:[id number] LOS:0 ROOM: [facility]     Reason for admission: Subarachnoid bleed     Assessment / Plan     Posttraumatic subarachnoid hemorrhage  Right frontal lobe intraparenchymal hemorrhage  Fall, initial counter  Nondisplaced skull base fracture  -Repeat CT head 6 hours from initial image   -SBP <150  -Hourly neurochecks  -Avoid anticoagulant and platelet inhibiting medications; no ASA   -Pain control per primary team; avoid heavily sedating medications, until hourly neurochecks can be changed  -Neurosurgery Following  -Monitor for any sings of clear rhinorrhea, which could be indicative of CSF leak and notify neurosurgery   -Notify neurosurgery if clear/watery rhinorrhea develops    Essential hypertension  -Continue home hydrochlorothiazide  -Continue home losartan    Hyperlipidemia  -Continue home Lipitor    Vitamin D deficiency  -Continue home cholecalciferol    Reflux disease  -PPI    Tobacco use  -Nicotine patch applied  -Currently smokes 0.25 pack a day  -Counseled on lifestyle  "modification and smoking cessation     Code Status (Patient has no pulse and is not breathing): CPR (Attempt to Resuscitate)  Medical Interventions (Patient has pulse or is breathing): Full Support       Nutrition:   NPO Diet NPO Type: Strict NPO     DVT prophylaxis:  Mechanical DVT prophylaxis orders are present.     History of Present illness     Shanon Duvall is a 62 y.o. female with PMH of hypertension, hyperlipidemia, vitamin D deficiency, GERD who presented after falling at home, and was admitted with a principal diagnosis of Subarachnoid bleed.  She has slipped and fallen down some steps of her stairway and struck her head.  She has not lost consciousness but has been nauseated since.  Complains of pain to her neck and her head.  Patient had a 4 cm forehead laceration, which was cleaned and had, and had closed with 5-0 nylon.  There was no foreign body noted in the wound.      ED course: In the emergency room patient underwent CT of head, facial bones, cervical spine, and skull.  Which showed \"Intracranial hemorrhage is present, with areas of presumed traumatic subarachnoid hemorrhage present within the right sylvian fissure and along the right inferior frontal lobe which also demonstrates small hemorrhagic contusion. There is mild associated right-sided cerebral edema without global mass effect or midline shift. Acute right-sided facial bone fractures including nondisplaced right frontal bone fracture extending across the frontal sinus and involving the nasion as well as the orbital roof. There is also a minimally displaced acute fracture of the right lamina papyracea and tiny nondisplaced fracture of the right zygoma at its root. The orbits are otherwise normal without evidence of intraconal hemorrhage or mass effect.  No acute fracture or traumatic malalignment of the cervical spine\".    ACP: Patient does not have advanced directive on file at this facility, however she is alert and oriented x4 and " declares herself full code.    Patient was seen and examined on 10/04/23 at 19:39 EDT .    Subjective / Review of systems     Review of Systems   Constitutional:  Negative for chills and fever.   HENT:  Negative for congestion and sore throat.    Respiratory:  Negative for cough and shortness of breath.    Cardiovascular:  Negative for chest pain and palpitations.   Gastrointestinal:  Negative for abdominal pain and nausea.   Endocrine: Negative for heat intolerance and polyuria.   Genitourinary:  Negative for dysuria and urgency.   Musculoskeletal:  Negative for arthralgias and myalgias.   Skin:  Negative for rash and wound.   Neurological:  Positive for headaches. Negative for weakness.   Psychiatric/Behavioral:  Negative for suicidal ideas. The patient is not nervous/anxious.       Past Medical/Surgical/Social/Family History & Allergies     Past Medical History:   Diagnosis Date    Hyperlipidemia     Hypertension     Subarachnoid bleed 10/4/2023      Past Surgical History:   Procedure Laterality Date    AUGMENTATION MAMMAPLASTY Bilateral 1999    CARDIAC CATHETERIZATION  20 years ago approx    I was in rehab, developed chest pain, sent to [facility], had a heart cath. There was some blockage but insignificant. No treatment.    CARDIAC CATHETERIZATION N/A 10/3/2023    Procedure: LEFT HEART CATH with possible PCI,radial;  Surgeon: Ranjit Crespo DO;  Location: [facility] CATH INVASIVE LOCATION;  Service: Cardiovascular;  Laterality: N/A;  Local and IV sedation  !!! RADIAL !!!      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 Dr. Brady    Heart disease Sister     Hypertension Brother     Hyperlipidemia Brother     Parkinsonism Half-Brother       No Known Allergies   Social Determinants of Health     Tobacco Use: High Risk    Smoking Tobacco Use: Some Days    Smokeless Tobacco Use: Never    Passive Exposure: Not on file   Alcohol Use: Not At Risk    Frequency of Alcohol Consumption: Never    Average Number of Drinks: Patient does not drink    Frequency of Binge Drinking: Never   Financial Resource Strain: Not on file   Food Insecurity: Not on file   Transportation Needs: Not on file   Physical Activity: Not on file   Stress: Not on file   Social Connections: Not on file   Intimate Partner Violence: Not on file   Depression: Not on file   Housing Stability: Not on file        Home Medications     Prior to Admission medications    Medication Sig Start Date End Date Taking? Authorizing Provider   ALPRAZolam (Xanax) 0.25 MG tablet Take 1 tablet by mouth Every 8 (Eight) Hours As Needed for Anxiety for up to 30 doses. 22   Shay Espinoza MD   aspirin 325 MG tablet Take 1 tablet by mouth Daily.    Provider, MD Olegario   atorvastatin (LIPITOR) 20 MG tablet Take 1 tablet by mouth Daily. 10/3/23   Ranjit Crespo,    Cholecalciferol (VITAMIN D) 50 MCG ( UT) tablet Daily. 10/26/18   Provider, MD Olegario   FLUoxetine (PROzac) 40 MG capsule Take 2 capsules by mouth Daily. 23   Shay Espinoza MD   losartan-hydrochlorothiazide (HYZAAR) 100-25 MG per tablet Take 1 tablet by mouth Daily. 7/10/23   Shay Espinoza  MD   Multiple Vitamins-Minerals (ZINC PO) Take  by mouth.    Provider, MD Olegario   pantoprazole (PROTONIX) 40 MG EC tablet Take 1 tablet by mouth Daily.    Provider, Olegario, MD        Objective / Physical Exam     Vital signs:  Temp: 98.5 °F (36.9 °C)  BP: 134/77  Heart Rate: 82  Resp: 12  SpO2: 98 %  Weight: 68.4 kg (150 lb 12.7 oz)    Admission Weight: Weight: 68.4 kg (150 lb 12.7 oz)    Physical Exam  Constitutional:       Appearance: She is normal weight.   HENT:      Head: Normocephalic. Right periorbital erythema present.      Jaw: Swelling present.      Comments: Sutured in ED     Right Ear: Hearing and tympanic membrane normal.      Left Ear: Hearing and tympanic membrane normal.      Nose: Nose normal.      Mouth/Throat:      Mouth: Mucous membranes are moist.   Eyes:      General:         Left eye: Discharge present.     Comments: Unable to assess EOM on right eye, as it is bruised an almost completely swollen shut; Normal EOM on left eye   Cardiovascular:      Rate and Rhythm: Normal rate and regular rhythm.      Pulses: Normal pulses.      Heart sounds: Normal heart sounds.   Pulmonary:      Effort: Pulmonary effort is normal.      Breath sounds: Decreased breath sounds present.   Abdominal:      General: Abdomen is flat. Bowel sounds are normal.      Palpations: Abdomen is soft.   Musculoskeletal:         General: Normal range of motion.   Skin:     General: Skin is warm.      Coloration: Skin is jaundiced and pale.   Neurological:      General: No focal deficit present.      Mental Status: She is alert.   Psychiatric:         Mood and Affect: Mood normal.         Behavior: Behavior normal.        Labs     Results from last 7 days   Lab Units 09/30/23  1240   WBC 10*3/mm3 9.00   HEMATOCRIT % 47.7*   PLATELETS 10*3/mm3 278        Results from last 7 days   Lab Units 09/30/23  1240   SODIUM mmol/L 137   POTASSIUM mmol/L 3.9   CHLORIDE mmol/L 99   CO2 mmol/L 24.2   BUN mg/dL 18   CREATININE mg/dL  0.74          Imaging     Chest X ray: My independent assessment showed no infiltrates or effusions    EKG: Pending at time of note    Current Medications     Scheduled Meds:  atorvastatin, 20 mg, Oral, Daily  FLUoxetine, 80 mg, Oral, Daily  losartan, 100 mg, Oral, Q24H   And  hydroCHLOROthiazide, 25 mg, Oral, Q24H  nicotine, 1 patch, Transdermal, Q24H  sodium chloride, 10 mL, Intravenous, Q12H         Continuous Infusions:      Patient continues to be critically ill, remains at risk of clinical deterioration or death and needed high complexity decision making. I have spent a total of 35 minutes providing critical care services to this patient including but not limited to: review of labs/ microbiology/imaging/medications, serial monitoring of vital signs, review of other consultant's notes, review of events in the last 24 hrs, monitoring input/output, review of treatment plan with bedside nurse, RT and other treatment team, management of life support and nutrition needs. I also spoke with patient  about the plan of care and answered all questions.    Time spent in performing separately billable procedures and updating family is not included in the critical care time.       Electronically signed by GEORGE Mari,  10/04/23  7:39 PM EDT.            Electronically signed by Ranjit Jacobsen DO at 10/05/23 0849          Emergency Department Notes        Sabino Velez MD at 10/04/23 1600          Subjective   History of Present Illness  Patient is a 62-year-old female who had slipped and fallen down some steps and struck her head.  Patient states she had no loss of consciousness but is nauseated.  Complains of pain to her head and her face.    Review of Systems    Past Medical History:   Diagnosis Date    Hyperlipidemia     Hypertension     Subarachnoid bleed 10/4/2023       No Known Allergies    Past Surgical History:   Procedure Laterality Date    AUGMENTATION MAMMAPLASTY Bilateral 1999     CARDIAC CATHETERIZATION  20 years ago approx    I was in rehab, developed chest pain, sent to [facility], had a heart cath. There was some blockage but insignificant. No treatment.    CARDIAC CATHETERIZATION N/A 10/3/2023    Procedure: LEFT HEART CATH with possible PCI,radial;  Surgeon: Ranjit Crespo DO;  Location: [facility] CATH INVASIVE LOCATION;  Service: Cardiovascular;  Laterality: N/A;  Local and IV sedation  !!! RADIAL !!!       Family History   Problem Relation Age of Onset    Breast cancer Maternal Aunt         50's    Coronary artery disease Father     Heart attack Father          8 years ago, probable heart  80 years old    Hyperlipidemia Father     Hypertension Sister         90% circumflex artery stent placed ? Dr. 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  Pupils are equal and reactive to light.  TMs clear.  Neck is nontender.  Neurologic exam is nonfocal.  Chest is mildly tender.  Abdomen soft nontender.  Extremities M is unremarkable.  Procedures    Patient had her facial wound anesthetized with 1% lidocaine with epinephrine.  The wound was cleaned and closed under sterile prep drape using 5-0 nylon.  Sterile dressing was applied.  No foreign body was noted.      ED Course      Results for  orders placed or performed in visit on 09/30/23   CBC (No Diff)    Specimen: Blood   Result Value Ref Range    WBC 9.00 3.40 - 10.80 10*3/mm3    RBC 5.12 3.77 - 5.28 10*6/mm3    Hemoglobin 15.9 12.0 - 15.9 g/dL    Hematocrit 47.7 (H) 34.0 - 46.6 %    MCV 93.2 79.0 - 97.0 fL    MCH 31.1 26.6 - 33.0 pg    MCHC 33.4 31.5 - 35.7 g/dL    RDW 13.9 12.3 - 15.4 %    RDW-SD 45.5 37.0 - 54.0 fl    MPV 8.1 6.0 - 12.0 fL    Platelets 278 140 - 450 10*3/mm3   Lipid Panel    Specimen: Blood   Result Value Ref Range    Total Cholesterol 211 (H) 0 - 200 mg/dL    Triglycerides 291 (H) 0 - 150 mg/dL    HDL Cholesterol 36 (L) 40 - 60 mg/dL    LDL Cholesterol  124 (H) 0 - 100 mg/dL    VLDL Cholesterol 51 (H) 5 - 40 mg/dL    LDL/HDL Ratio 3.24    Basic Metabolic Panel    Specimen: Blood   Result Value Ref Range    Glucose 83 65 - 99 mg/dL    BUN 18 8 - 23 mg/dL    Creatinine 0.74 0.57 - 1.00 mg/dL    Sodium 137 136 - 145 mmol/L    Potassium 3.9 3.5 - 5.2 mmol/L    Chloride 99 98 - 107 mmol/L    CO2 24.2 22.0 - 29.0 mmol/L    Calcium 9.5 8.6 - 10.5 mg/dL    BUN/Creatinine Ratio 24.3 7.0 - 25.0    Anion Gap 13.8 5.0 - 15.0 mmol/L    eGFR 90.5 >60.0 mL/min/1.73   Protime-INR    Specimen: Blood   Result Value Ref Range    Protime 10.8 9.6 - 11.7 Seconds    INR 0.99 0.93 - 1.10     CT Head Without Contrast    Result Date: 10/4/2023  Impression: Intracranial hemorrhage is present, with areas of presumed traumatic subarachnoid hemorrhage present within the right sylvian fissure and along the right inferior frontal lobe which also demonstrates small hemorrhagic contusion. There is mild associated right-sided cerebral edema without global mass effect or midline shift. Acute right-sided facial bone fractures including nondisplaced right frontal bone fracture extending across the frontal sinus and involving the nasion as well as the orbital roof. There is also a minimally displaced acute fracture of the right lamina papyracea and tiny nondisplaced  fracture of the right zygoma at its root. The orbits are otherwise normal without evidence of intraconal hemorrhage or mass effect. No acute fracture or traumatic malalignment of the cervical spine. Above findings relayed to the emergency room by Connor Cavazos via the epic messaging system at 2:57 p.m. 10/4/2023 Electronically Signed: Caden Cavazos MD  10/4/2023 2:58 PM EDT  Workstation ID: [id number]    CT Cervical Spine Without Contrast    Result Date: 10/4/2023  Impression: Intracranial hemorrhage is present, with areas of presumed traumatic subarachnoid hemorrhage present within the right sylvian fissure and along the right inferior frontal lobe which also demonstrates small hemorrhagic contusion. There is mild associated right-sided cerebral edema without global mass effect or midline shift. Acute right-sided facial bone fractures including nondisplaced right frontal bone fracture extending across the frontal sinus and involving the nasion as well as the orbital roof. There is also a minimally displaced acute fracture of the right lamina papyracea and tiny nondisplaced fracture of the right zygoma at its root. The orbits are otherwise normal without evidence of intraconal hemorrhage or mass effect. No acute fracture or traumatic malalignment of the cervical spine. Above findings relayed to the emergency room by Connor Cavazos via the epic messaging system at 2:57 p.m. 10/4/2023 Electronically Signed: Caden Cavazos MD  10/4/2023 2:58 PM EDT  Workstation ID: [id number]    CT Facial Bones Without Contrast    Result Date: 10/4/2023  Impression: Intracranial hemorrhage is present, with areas of presumed traumatic subarachnoid hemorrhage present within the right sylvian fissure and along the right inferior frontal lobe which also demonstrates small hemorrhagic contusion. There is mild associated right-sided cerebral edema without global mass effect or midline shift. Acute right-sided facial bone fractures  including nondisplaced right frontal bone fracture extending across the frontal sinus and involving the nasion as well as the orbital roof. There is also a minimally displaced acute fracture of the right lamina papyracea and tiny nondisplaced fracture of the right zygoma at its root. The orbits are otherwise normal without evidence of intraconal hemorrhage or mass effect. No acute fracture or traumatic malalignment of the cervical spine. Above findings relayed to the emergency room by Connor Cavazos via the epic messaging system at 2:57 p.m. 10/4/2023 Electronically Signed: Caden Cavazos MD  10/4/2023 2:58 PM EDT  Workstation ID: [id number]                                        Medical Decision Making  My interpretation patient CT scan of her head without contrast shows right frontal intracerebral hemorrhage as well as subarachnoid hemorrhage.  Patient is noted to have multiple right facial fractures including her right frontal bone that extends through the sinus.  Laboratory data shows no anemia or other abnormality.    Amount and/or Complexity of Data Reviewed  Labs: ordered. Decision-making details documented in ED Course.  Radiology: ordered and independent interpretation performed.    Risk  Prescription drug management.  Decision regarding hospitalization.        Final diagnoses:   Traumatic right-sided intracerebral hemorrhage without loss of consciousness, initial encounter   Subarachnoid hemorrhage   Multiple closed fractures of facial bone, initial encounter   Laceration of forehead, initial encounter   Fall, initial encounter       ED Disposition  ED Disposition       ED Disposition   Decision to Admit    Condition   --    Comment   Level of Care: Critical Care [6]   Diagnosis: Subarachnoid bleed [104753]   Admitting Physician: EMILIA NORIEGA [[id number]]   Attending Physician: EMILIA NORIEGA [[id number]]   Certification: I Certify That Inpatient Hospital Services Are Medically Necessary For  Greater Than 2 Midnights                 No follow-up provider specified.       Medication List      No changes were made to your prescriptions during this visit.            Sabino Velez MD  10/04/23 1608      Electronically signed by Sabino Vleez MD at 10/04/23 1608       Sheri Curiel at 10/04/23 1427          [facility] transport requested to CT rm 2     Electronically signed by Sheri Curiel at 10/04/23 1427       Operative/Procedure Notes (all)    No notes of this type exist for this encounter.          Physician Progress Notes (all)        Ranjit Jacobsen DO at 10/05/23 1225            Critical Care Progress Note   Shanon Duvall : 1960 MRN:[id number] LOS:1     Principal Problem: Subarachnoid bleed     Reason for follow up: All the medical problems listed below    Summary     A 62 y.o. female admitted with a principal diagnosis of Subarachnoid bleed.      Significant events     10/05/23 : Afeb, VSS.  800 mL UOP since admission, net -380 mL.  WBC minimally elevated at 12.6k w/ 77% N.      Assessment / Plan     Posttraumatic subarachnoid hemorrhage  Right frontal lobe intraparenchymal hemorrhage  Fall, initial counter  Nondisplaced skull base fracture  -Repeat CT head last night was negative for further change.  Repeat CT pending today.   -SBP <150  -Avoid anticoagulant and platelet inhibiting medications; no ASA   -Neurosurgery following; no intervention planned       Pulmonary rales  Tobacco use  -Nicotine patch applied  -Currently smokes 0.25 pack a day  -Counseled on lifestyle modification and smoking cessation  -Hasn't received an excessive amt of fluids--recommend OP comprehensive PFT's      Essential hypertension  -Continue home hydrochlorothiazide  -Continue home losartan       Hyperlipidemia  -Continue home Lipitor       Vitamin D deficiency  -Continue home cholecalciferol       Reflux disease  -PPI           Dispo:  Downgrade to [facility].        Code status:   Code Status (Patient has  no pulse and is not breathing): CPR (Attempt to Resuscitate)  Medical Interventions (Patient has pulse or is breathing): Full Support       Nutrition: Diet: Cardiac Diets; Healthy Heart (2-3 Na+); Texture: Regular Texture (IDDSI 7); Fluid Consistency: Thin (IDDSI 0)   Patient isn't on Tube Feeding    DVT prophylaxis:  Mechanical DVT prophylaxis orders are present.     Subjective / Review of systems     Review of Systems   Pt denies CP or SOB.  Denies f/c/s/n/v.  Has a H/A.  Denies asymmetric weakness, change in vision (other than from swelling on right eye).  Objective / Physical Exam   Vital signs:  Temp: 98.4 °F (36.9 °C)  BP: 128/82  Heart Rate: 69  Resp: 14  SpO2: 96 %  Weight: 70.9 kg (156 lb 4.9 oz)    Admission Weight: Weight: 68.4 kg (150 lb 12.7 oz)  Current Weight: Weight: 70.9 kg (156 lb 4.9 oz)    Input/Output in last 24 hours:    Intake/Output Summary (Last 24 hours) at 10/5/2023 1225  Last data filed at 10/5/2023 1105  Gross per 24 hour   Intake 420 ml   Output 800 ml   Net -380 ml      Physical Exam     GEN:  Pleasant.  Appears appropriate for stated age.  WD/WN/WH.  Sitting up in bed on RA.  NAD.  NEURO:  Brainstem reflexes intact.  No obvious focal deficit.  Moves all 4 ext.  HEENT:  PERRL.  MMM.  Oropharynx non-erythematous.  No drainage from the eyes/ears/nose.  No conjunctival petechiae.  No oral thrush.  Auditory and visual acuity grossly wnl.  Good dentition.  Voice normal.  Extensive facial and periorbital ecchymosis around R eye and forehead.    NECK:  Supple, NT, trachea midline.  No meningismus.  No ROM limitation.  No torticollis.  No JVD.     CHEST/LUNGS:  Breath sounds are noted for bibasilar crackles. Chest excursion equal bilaterally.    CARDIOVASCULAR:  RRR w/o murmur noted.    GI:  Abdomen soft, NT, ND, +BS.   :  Deferred.  EXTREMITIES:  No deformity or amputation.  No cyanosis, edema, or asymmetry.  Pulses 2+ and equal in BLE's.    SKIN:  Warm, dry, and pink.  No rash, breakdown,  or track marks noted.  LYMPHATICS/HEME:  No overt LAD or abnormal bruising.  No lymphedema.  MSK:  Normal ROM.  No joint abnormalities noted.  Strength is 5/5 and equal in BUE and BLE's.  PSYCH:  Pleasant.  A&Ox 3.  Normal mood and affect.  Responds appropriately to commands and appears to comprehend instructions.          Radiology and Labs     Results from last 7 days   Lab Units 10/04/23  2024 09/30/23  1240   WBC 10*3/mm3 12.60* 9.00   HEMATOCRIT % 46.5 47.7*   PLATELETS 10*3/mm3 243 278      Results from last 7 days   Lab Units 10/04/23  2024 09/30/23  1240   SODIUM mmol/L 138 137   POTASSIUM mmol/L 4.0 3.9   CHLORIDE mmol/L 102 99   CO2 mmol/L 26.0 24.2   BUN mg/dL 14 18   CREATININE mg/dL 0.70 0.74      Current medications   Scheduled Meds: atorvastatin, 20 mg, Oral, Daily  FLUoxetine, 80 mg, Oral, Daily  losartan, 100 mg, Oral, Q24H   And  hydroCHLOROthiazide, 25 mg, Oral, Q24H  nicotine, 1 patch, Transdermal, Daily  sodium chloride, 10 mL, Intravenous, Q12H  vitamin D3, 5,000 Units, Oral, Daily      Continuous Infusions:      Plan discussed with RN. Reviewed all other data in the last 24 hours, including but not limited to vitals, labs, microbiology, imaging and pertinent notes from other providers.     Ranjit Jacobsen DO   Critical Care  10/05/23   12:25 EDT       Electronically signed by Ranjit Jacobsen DO at 10/05/23 1244       Jim Solitario PA at 10/05/23 0921       Attestation signed by Jose Maria Blackwood MD at 10/05/23 1014    I have reviewed this documentation and agree.                    Neurosurgery Progress Note    Date: 10/05/23     Patient: Shanon Duvall   Sex: female   : 1960      SUBJECTIVE    Interval history:  No adverse events overnight.  Patient continues with mild headache but otherwise has no new complaints.  She did feel like may be here nose was running a little bit last night.  No clear rhinorrhea present.        Current Medications:  Scheduled  "Meds:atorvastatin, 20 mg, Oral, Daily  FLUoxetine, 80 mg, Oral, Daily  losartan, 100 mg, Oral, Q24H   And  hydroCHLOROthiazide, 25 mg, Oral, Q24H  nicotine, 1 patch, Transdermal, Daily  sodium chloride, 10 mL, Intravenous, Q12H  vitamin D3, 5,000 Units, Oral, Daily      Continuous Infusions:   PRN Meds:   butalbital-acetaminophen-caffeine    ondansetron    sodium chloride    sodium chloride      OBJECTIVE  Vitals:    10/05/23 0500 10/05/23 0600 10/05/23 0800 10/05/23 0900   BP: 127/80 134/83 128/87 150/95   BP Location:   Left arm    Patient Position:   Lying    Pulse: 74 71 70 89   Resp: 16 17 16    Temp:   98.3 °F (36.8 °C)    TempSrc:   Oral    SpO2: 92% 92% 94% 94%   Weight: 70.9 kg (156 lb 4.9 oz)      Height: 157.5 cm (62.01\")          Physical exam    General  - Awake, cooperative, in no acute distress  HEENT  - Stable craniofacial trauma, primarily right-sided  - PERRLA, EOM intact  - No CSF rhinorrhea expressed on exam  - Nasal vault has some dried blood but is otherwise unremarkable  Respiratory  - Normal respiratory rate and effort on room air  Skin  - Right periorbital ecchymosis, stable  - Right frontotemporal laceration, sutured  - Scattered areas of ecchymosis and abrasions secondary to trauma  NEUROLOGIC  - A/O x3, GCS 4-6-5  - CN II-XII grossly intact  - Moves all extremities symmetrically and w/ 5/5 strength  - Sensation intact throughout  - Negative pronator drift  - Normal finger-nose coordination      Results review  CBC          3/8/2023    14:07 9/30/2023    12:40 10/4/2023    20:24   CBC   WBC 8.32  9.00  12.60    RBC 4.85  5.12  4.98    Hemoglobin 15.1  15.9  15.2    Hematocrit 44.6  47.7  46.5    MCV 92.0  93.2  93.3    MCH 31.1  31.1  30.5    MCHC 33.9  33.4  32.7    RDW 12.4  13.9  13.7    Platelets 281  278  243        BMP          1/18/2023    09:01 9/30/2023    12:40 10/4/2023    20:24   BMP   BUN 18  18  14    Creatinine 0.77  0.74  0.70    Sodium 140  137  138    Potassium 4.1  3.9  " 4.0    Chloride 102  99  102    CO2 30.0  24.2  26.0    Calcium 9.6  9.5  9.3             CT Cervical Spine Without Contrast    Result Date: 10/4/2023  CT FACIAL BONES WO CONTRAST, CT HEAD WO CONTRAST, CT CERVICAL SPINE WO CONTRAST Date of Exam: 10/4/2023 2:44 PM EDT Indication: Facial trauma. Comparison: None available. Technique: Axial CT images were obtained of the head, face and cervical spine without contrast administration.  Sagittal and coronal reconstructions were performed.  Automated exposure control and iterative reconstruction methods were used. Findings: CT head: There is evidence of subarachnoid hemorrhage involving the right sylvian fissure extending along the anterior margin of the suprasellar cistern as well as involving the right frontal lobe, with some component of small contusion present involving  the inferior right frontal lobe. Mild right frontal cerebral edema is present, without mass effect. The ventricles are normal in size and configuration. The orbits appear normal. CT face: There is acute fracture present spanning the right frontal bone and orbital roof, with involvement of the anterior wall of the right frontal sinus with associated presumed hemorrhage within the frontal sinus. There is also likely a subtle acute fracture involving the right zygoma, best appreciated on axial image 46, nondisplaced. The right frontal fracture likely also extends to involve the right nasion. The pterygoid plates are intact. There is no evidence of mandibular fracture. There is no evidence of intraconal hemorrhage or mass effect. The lamina papyracea on the right appears to demonstrate minimal acute fracture best appreciated on image 45 with some adjacent blood products within the ethmoid sinuses. The superficial facial soft tissues demonstrate some edema along the right frontal region. The orbits are otherwise normal with the extraocular muscles appearing intact and globes symmetric. CT cervical spine:  Cortical margins are intact and alignment is maintained, without evidence of acute fracture or traumatic malalignment. There is no suspicious lytic or sclerotic lesion. Minimal spondylosis changes are evident. The prevertebral soft tissues are normal. The dens is intact.     Impression: Impression: Intracranial hemorrhage is present, with areas of presumed traumatic subarachnoid hemorrhage present within the right sylvian fissure and along the right inferior frontal lobe which also demonstrates small hemorrhagic contusion. There is mild associated right-sided cerebral edema without global mass effect or midline shift. Acute right-sided facial bone fractures including nondisplaced right frontal bone fracture extending across the frontal sinus and involving the nasion as well as the orbital roof. There is also a minimally displaced acute fracture of the right lamina papyracea and tiny nondisplaced fracture of the right zygoma at its root. The orbits are otherwise normal without evidence of intraconal hemorrhage or mass effect. No acute fracture or traumatic malalignment of the cervical spine. Above findings relayed to the emergency room by Connor Cavazos via the epic messaging system at 2:57 p.m. 10/4/2023 Electronically Signed: Caden Cavazos MD  10/4/2023 2:58 PM EDT  Workstation ID: [id number]      CT Head Without Contrast    Result Date: 10/4/2023  CT HEAD WO CONTRAST Date of Exam: 10/4/2023 8:46 PM EDT Indication: Follow-up for intracranial hemorrhage. Comparison: 10/4/2023 at 2:37 p.m. Technique: Axial CT images were obtained of the head without contrast administration.  Coronal reconstructions were performed.  Automated exposure control and iterative reconstruction methods were used. Findings: The ventricles are normal in size and midline. There is no significant change in right-sided subarachnoid hemorrhage along the sylvian fissure extending along the anterior margin of the suprasellar cistern as well as  involving the right frontal lobe. Also small focal bone contusion in the right orbitofrontal lobe appears stable. There is no midline shift Again noticed fracture of the right frontal bone extending into the orbital roof involving the anterior wall of the right frontal sinus.     Impression: Impression: No significant interval change in right-sided intracranial hemorrhage as above. Close follow-up recommended No change in acute fractures as above Electronically Signed: Fran Tobar MD  10/4/2023 9:09 PM EDT  Workstation ID: [id number]    CT Head Without Contrast    Result Date: 10/4/2023  CT FACIAL BONES WO CONTRAST, CT HEAD WO CONTRAST, CT CERVICAL SPINE WO CONTRAST Date of Exam: 10/4/2023 2:44 PM EDT Indication: Facial trauma. Comparison: None available. Technique: Axial CT images were obtained of the head, face and cervical spine without contrast administration.  Sagittal and coronal reconstructions were performed.  Automated exposure control and iterative reconstruction methods were used. Findings: CT head: There is evidence of subarachnoid hemorrhage involving the right sylvian fissure extending along the anterior margin of the suprasellar cistern as well as involving the right frontal lobe, with some component of small contusion present involving  the inferior right frontal lobe. Mild right frontal cerebral edema is present, without mass effect. The ventricles are normal in size and configuration. The orbits appear normal. CT face: There is acute fracture present spanning the right frontal bone and orbital roof, with involvement of the anterior wall of the right frontal sinus with associated presumed hemorrhage within the frontal sinus. There is also likely a subtle acute fracture involving the right zygoma, best appreciated on axial image 46, nondisplaced. The right frontal fracture likely also extends to involve the right nasion. The pterygoid plates are intact. There is no evidence of mandibular fracture.  There is no evidence of intraconal hemorrhage or mass effect. The lamina papyracea on the right appears to demonstrate minimal acute fracture best appreciated on image 45 with some adjacent blood products within the ethmoid sinuses. The superficial facial soft tissues demonstrate some edema along the right frontal region. The orbits are otherwise normal with the extraocular muscles appearing intact and globes symmetric. CT cervical spine: Cortical margins are intact and alignment is maintained, without evidence of acute fracture or traumatic malalignment. There is no suspicious lytic or sclerotic lesion. Minimal spondylosis changes are evident. The prevertebral soft tissues are normal. The dens is intact.     Impression: Impression: Intracranial hemorrhage is present, with areas of presumed traumatic subarachnoid hemorrhage present within the right sylvian fissure and along the right inferior frontal lobe which also demonstrates small hemorrhagic contusion. There is mild associated right-sided cerebral edema without global mass effect or midline shift. Acute right-sided facial bone fractures including nondisplaced right frontal bone fracture extending across the frontal sinus and involving the nasion as well as the orbital roof. There is also a minimally displaced acute fracture of the right lamina papyracea and tiny nondisplaced fracture of the right zygoma at its root. The orbits are otherwise normal without evidence of intraconal hemorrhage or mass effect. No acute fracture or traumatic malalignment of the cervical spine. Above findings relayed to the emergency room by Connor Cavazos via the epic messaging system at 2:57 p.m. 10/4/2023 Electronically Signed: Caden Cavazos MD  10/4/2023 2:58 PM EDT  Workstation ID: [id number]          ASSESSMENT/PLAN  This is a 62 y.o. female being followed by neurosurgery for posttraumatic subarachnoid hemorrhage and intraparenchymal hemorrhage of the right frontal lobe.  She is  neurologically and hemodynamically stable.  No signs of CSF rhinorrhea.  Repeat CT head shows no interval changes of intracranial hemorrhage.    Patient okay to transfer out of the ICU and onto neuro floor.  We will get a final repeat CT head tomorrow morning.  She is okay to eat and take p.o. medications from a neurosurgical standpoint.  We will continue routine neurochecks and blood pressure control as below.  If she remains clinically and radiographically stable then anticipate she may be okay to discharge as early as tomorrow morning.      - Okay to transfer to neuro floor  - Repeat CT head tomorrow morning  - Every 4 hour neurochecks  - SBP <160  - Okay to mobilize once cleared by PT/OT  - Tylenol or Fioricet for headache  - Okay for normal diet and p.o. meds from neurosurgical standpoint  - Call with any questions or concerns        I discussed my assessment and recommendations with Dr. Blackwood, the ICU nursing staff, the patient, and her spouse    Jim Solitario PA-C  10/05/23  09:21 EDT      Part of this note may be an electronic transcription/translation of spoken language to printed text using the Dragon Dictation System.    Electronically signed by Jose Maria Blackwood MD at 10/05/23 1014          Consult Notes (all)        Jim Solitario PA at 10/04/23 1633        Consult Orders    1. Inpatient Neurosurgery Consult [[id number]] ordered by Meagan Elder APRN at 10/04/23 1606              Summary:Neurosurgery consult                   Neurosurgery Consultation      Patient: Shanon Duvall    Sex: female    YOB: 1960    Date of Admission: 10/4/2023  2:18 PM    Admitting Dx: Subarachnoid bleed [I60.9]    Reason for Consult: Subarachnoid hemorrhage      Subjective     HPI:  Patient is a 62 y.o. female hypertension who presented to the ED today after suffering a fall down some stairs.  Patient struck her head but reports no loss of consciousness.  Neurosurgery was consulted due to  intracranial hemorrhage seen on CT.    During my evaluation patient was awake, alert, and oriented.  Signs of craniofacial trauma secondary to her fall with repaired lacerations.  She reports pain associated with these areas as well as moderate headache.  She has no acute visual disturbance, outside of irritation from her right upper eyelid contusion.  She denies speech difficulties, lateralizing weakness, and clear rhinorrhea.      PMH:  Past Medical History:   Diagnosis Date    Hyperlipidemia     Hypertension     Subarachnoid bleed 10/4/2023         Current Facility-Administered Medications:     sodium chloride 0.9 % flush 10 mL, 10 mL, Intravenous, Q12H, Jerrod, Meagan, APRN    sodium chloride 0.9 % flush 10 mL, 10 mL, Intravenous, PRN, Jerrod, Meagan, APRN    sodium chloride 0.9 % infusion 40 mL, 40 mL, Intravenous, PRN, Jerrod, Meagan, APRN    Current Outpatient Medications:     ALPRAZolam (Xanax) 0.25 MG tablet, Take 1 tablet by mouth Every 8 (Eight) Hours As Needed for Anxiety for up to 30 doses., Disp: 30 tablet, Rfl: 0    aspirin 325 MG tablet, Take 1 tablet by mouth Daily., Disp: , Rfl:     atorvastatin (LIPITOR) 20 MG tablet, Take 1 tablet by mouth Daily., Disp: 90 tablet, Rfl: 3    Cholecalciferol (VITAMIN D) 50 MCG (2000 UT) tablet, Daily., Disp: , Rfl:     FLUoxetine (PROzac) 40 MG capsule, Take 2 capsules by mouth Daily., Disp: 180 capsule, Rfl: 1    losartan-hydrochlorothiazide (HYZAAR) 100-25 MG per tablet, Take 1 tablet by mouth Daily., Disp: 90 tablet, Rfl: 3    Multiple Vitamins-Minerals (ZINC PO), Take  by mouth., Disp: , Rfl:     pantoprazole (PROTONIX) 40 MG EC tablet, Take 1 tablet by mouth Daily., Disp: , Rfl:     No Known Allergies    Past Surgical History:   Procedure Laterality Date    AUGMENTATION MAMMAPLASTY Bilateral 1999    CARDIAC CATHETERIZATION  20 years ago approx    I was in rehab, developed chest pain, sent to [facility], had a heart cath. There was some  blockage but insignificant. No treatment.    CARDIAC CATHETERIZATION N/A 10/3/2023    Procedure: LEFT HEART CATH with possible PCI,radial;  Surgeon: Ranjit Crespo DO;  Location: [facility] CATH INVASIVE LOCATION;  Service: Cardiovascular;  Laterality: N/A;  Local and IV sedation  !!! RADIAL !!!       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 Dr. Brady    Heart disease Sister     Hypertension Brother     Hyperlipidemia Brother     Parkinsonism Half-Brother          Current Medications:  Scheduled Meds:sodium chloride, 10 mL, Intravenous, Q12H      Continuous Infusions:   PRN Meds:   sodium chloride    sodium chloride    ROS: 14 point review of systems was completed and is negative except for what is noted in HPI      Objective     Vitals:    10/04/23 1556 10/04/23 1601 10/04/23 1605 10/04/23 1611   BP:  127/70 126/71 108/75   BP Location:       Patient Position:       Pulse: 80   85   Resp:       Temp:       SpO2: 95%   99%   Weight:       Height:           Physical exam  General  - awake, cooperative, in no acute distress  HEENT  - Predominantly right-sided craniofacial trauma  - Right-sided forehead laceration, sutured  - Right  periorbital ecchymosis and upper eyelid edema  - PERRLA, EOM intact  Cardiac  - RRR, no peripheral edema  Respiratory  - Normal respiratory rate and effort on room air  Skin  - Craniofacial trauma as above      NEUROLOGIC    MENTAL STATUS:  - A/O x3  - GCS 4-6-5  CRANIAL NERVES:  II:      Pupils equal and reactive  III, IV, VI: EOM intact, no gaze preference or deviation, no nystagmus.  V:      Normal sensation in V1, V2, and V3 segments bilaterally  VII:      No asymmetry, no nasolabial fold flattening  VIII:      Normal hearing to speech  IX, X:      Normal palatal elevation, no uvular deviation  XI:      5/5 head turn and 5/5 shoulder shrug bilaterally  XII:      Midline tongue protrusion  MOTOR:  - JEFFERY symmetrically  - 5/5 strength all 4 extremities  SENSORY:  - Normal to touch and pinprick, axial and peripheral  COORDINATION:  - No dysmetria  GAIT:  - Deferred            RESULTS REVIEW:  Results from last 7 days   Lab Units 09/30/23  1240   WBC 10*3/mm3 9.00   HEMOGLOBIN g/dL 15.9   HEMATOCRIT % 47.7*   PLATELETS 10*3/mm3 278        Results from last 7 days   Lab Units 09/30/23  1240   SODIUM mmol/L 137   POTASSIUM mmol/L 3.9   CHLORIDE mmol/L 99   CO2 mmol/L 24.2   BUN mg/dL 18   CREATININE mg/dL 0.74   CALCIUM mg/dL 9.5   GLUCOSE mg/dL 83           CT Cervical Spine Without Contrast    Result Date: 10/4/2023  CT FACIAL BONES WO CONTRAST, CT HEAD WO CONTRAST, CT CERVICAL SPINE WO CONTRAST Date of Exam: 10/4/2023 2:44 PM EDT Indication: Facial trauma. Comparison: None available. Technique: Axial CT images were obtained of the head, face and cervical spine without contrast administration.  Sagittal and coronal reconstructions were performed.  Automated exposure control and iterative reconstruction methods were used. Findings: CT head: There is evidence of subarachnoid hemorrhage involving the right sylvian fissure extending along the anterior margin of the suprasellar cistern as well as involving the right  frontal lobe, with some component of small contusion present involving  the inferior right frontal lobe. Mild right frontal cerebral edema is present, without mass effect. The ventricles are normal in size and configuration. The orbits appear normal. CT face: There is acute fracture present spanning the right frontal bone and orbital roof, with involvement of the anterior wall of the right frontal sinus with associated presumed hemorrhage within the frontal sinus. There is also likely a subtle acute fracture involving the right zygoma, best appreciated on axial image 46, nondisplaced. The right frontal fracture likely also extends to involve the right nasion. The pterygoid plates are intact. There is no evidence of mandibular fracture. There is no evidence of intraconal hemorrhage or mass effect. The lamina papyracea on the right appears to demonstrate minimal acute fracture best appreciated on image 45 with some adjacent blood products within the ethmoid sinuses. The superficial facial soft tissues demonstrate some edema along the right frontal region. The orbits are otherwise normal with the extraocular muscles appearing intact and globes symmetric. CT cervical spine: Cortical margins are intact and alignment is maintained, without evidence of acute fracture or traumatic malalignment. There is no suspicious lytic or sclerotic lesion. Minimal spondylosis changes are evident. The prevertebral soft tissues are normal. The dens is intact.     Impression: Impression: Intracranial hemorrhage is present, with areas of presumed traumatic subarachnoid hemorrhage present within the right sylvian fissure and along the right inferior frontal lobe which also demonstrates small hemorrhagic contusion. There is mild associated right-sided cerebral edema without global mass effect or midline shift. Acute right-sided facial bone fractures including nondisplaced right frontal bone fracture extending across the frontal sinus and  involving the nasion as well as the orbital roof. There is also a minimally displaced acute fracture of the right lamina papyracea and tiny nondisplaced fracture of the right zygoma at its root. The orbits are otherwise normal without evidence of intraconal hemorrhage or mass effect. No acute fracture or traumatic malalignment of the cervical spine. Above findings relayed to the emergency room by Connor Cavazos via the epic messaging system at 2:57 p.m. 10/4/2023 Electronically Signed: Caden Cavazos MD  10/4/2023 2:58 PM EDT  Workstation ID: [id number]      CT Head Without Contrast    Result Date: 10/4/2023  CT FACIAL BONES WO CONTRAST, CT HEAD WO CONTRAST, CT CERVICAL SPINE WO CONTRAST Date of Exam: 10/4/2023 2:44 PM EDT Indication: Facial trauma. Comparison: None available. Technique: Axial CT images were obtained of the head, face and cervical spine without contrast administration.  Sagittal and coronal reconstructions were performed.  Automated exposure control and iterative reconstruction methods were used. Findings: CT head: There is evidence of subarachnoid hemorrhage involving the right sylvian fissure extending along the anterior margin of the suprasellar cistern as well as involving the right frontal lobe, with some component of small contusion present involving  the inferior right frontal lobe. Mild right frontal cerebral edema is present, without mass effect. The ventricles are normal in size and configuration. The orbits appear normal. CT face: There is acute fracture present spanning the right frontal bone and orbital roof, with involvement of the anterior wall of the right frontal sinus with associated presumed hemorrhage within the frontal sinus. There is also likely a subtle acute fracture involving the right zygoma, best appreciated on axial image 46, nondisplaced. The right frontal fracture likely also extends to involve the right nasion. The pterygoid plates are intact. There is no evidence of  mandibular fracture. There is no evidence of intraconal hemorrhage or mass effect. The lamina papyracea on the right appears to demonstrate minimal acute fracture best appreciated on image 45 with some adjacent blood products within the ethmoid sinuses. The superficial facial soft tissues demonstrate some edema along the right frontal region. The orbits are otherwise normal with the extraocular muscles appearing intact and globes symmetric. CT cervical spine: Cortical margins are intact and alignment is maintained, without evidence of acute fracture or traumatic malalignment. There is no suspicious lytic or sclerotic lesion. Minimal spondylosis changes are evident. The prevertebral soft tissues are normal. The dens is intact.     Impression: Impression: Intracranial hemorrhage is present, with areas of presumed traumatic subarachnoid hemorrhage present within the right sylvian fissure and along the right inferior frontal lobe which also demonstrates small hemorrhagic contusion. There is mild associated right-sided cerebral edema without global mass effect or midline shift. Acute right-sided facial bone fractures including nondisplaced right frontal bone fracture extending across the frontal sinus and involving the nasion as well as the orbital roof. There is also a minimally displaced acute fracture of the right lamina papyracea and tiny nondisplaced fracture of the right zygoma at its root. The orbits are otherwise normal without evidence of intraconal hemorrhage or mass effect. No acute fracture or traumatic malalignment of the cervical spine. Above findings relayed to the emergency room by Connor Cavazos via the epic messaging system at 2:57 p.m. 10/4/2023 Electronically Signed: Caden Cavazos MD  10/4/2023 2:58 PM EDT  Workstation ID: [id number]            Assessment     MDM: This is a 62 y.o. female who presents with posttraumatic right frontal subarachnoid hemorrhage and intraparenchymal hematoma secondary to  head trauma after falling down some stairs.  She also has a nondisplaced skull fracture involving the right frontal bone, frontal sinus, and orbital roof.  She is neurologically intact and hemodynamically stable.    Patient does not require urgent/emergent neurosurgical intervention at this time.  We will repeat CT head 6 hours from the initial image.  She should be admitted to the ICU for hourly neurochecks, tight blood pressure control, and medical management.  Stat CT head for any acute neurologic decline.  We will also continue to monitor for development of CSF rhinorrhea given the presence of these acute skull base fractures.          Plan     Posttraumatic subarachnoid hemorrhage  Right frontal lobe intraparenchymal hemorrhage  - Repeat CT head 6 hours from initial image  - SBP <150  - Hourly neurochecks  - Avoid anticoagulant and platelet inhibiting medications  - Avoid sedating medications while on hourly neurochecks      Fall, initial counter  Nondisplaced skull base fractures  - Pain control per primary team; avoid heavily sedating medications  - Notify neurosurgery if clear/watery rhinorrhea develops    Hypertension  - SBP <150    Medical management and pain control per primary team.  Neurosurgery will continue to follow closely.  Please call with any questions or concerns.    I discussed my assessment and recommendations with Dr. Blackwood, Dr. Velez, the patient and her family members at bedside      Jim Solitario PA-C  10/4/2023  16:33 EDT      Part of this note may be an electronic transcription/translation of spoken language to printed text using the Dragon Dictation System.    Electronically signed by Jim Solitario PA at 10/04/23 6408

## 2023-10-05 NOTE — CASE MANAGEMENT/SOCIAL WORK
Discharge Planning Assessment  [facility]     Patient Name: Shanon Duvall  MRN: [id number]  Today's Date: 10/5/2023    Admit Date: 10/4/2023    Plan: Anticipate routine home with spouse.   Discharge Needs Assessment       Row Name 10/05/23 1013       Living Environment    People in Home spouse    Name(s) of People in Home Spouse- Connor    Current Living Arrangements home    Potentially Unsafe Housing Conditions none    Primary Care Provided by self    Provides Primary Care For no one    Family Caregiver if Needed child(teo), adult;spouse    Family Caregiver Names Daughter- Carmen    Quality of Family Relationships supportive;involved;helpful    Able to Return to Prior Arrangements yes       Resource/Environmental Concerns    Resource/Environmental Concerns none    Transportation Concerns none       Transition Planning    Patient/Family Anticipates Transition to home with family    Patient/Family Anticipated Services at Transition none    Transportation Anticipated family or friend will provide;car, drives self       Discharge Needs Assessment    Readmission Within the Last 30 Days no previous admission in last 30 days    Equipment Currently Used at Home none    Concerns to be Addressed denies needs/concerns at this time    Anticipated Changes Related to Illness none    Equipment Needed After Discharge none    Provided Post Acute Provider List? N/A                   Discharge Plan       Row Name 10/05/23 1014       Plan    Plan Anticipate routine home with spouse.    Patient/Family in Agreement with Plan yes    Plan Comments CM met with patient and spouse at bedside to discuss dc planning. PCP and pharmacy confirmed, reported no trouble affording medications, agreeable to meds to bed, and declined needs at this time for any DME/HH/PT services. Patient reported that she is usually independent and does not use any DME. Reported that therapy told her she was doing well and she does not plan to have HH or OP PT.               Demographic Summary       Row Name 10/05/23 1013       General Information    Admission Type inpatient    Referral Source admission list    Reason for Consult discharge planning    Preferred Language English       Contact Information    Permission Granted to Share Info With                    Functional Status       Row Name 10/05/23 1013       Functional Status    Usual Activity Tolerance good    Current Activity Tolerance good       Functional Status, IADL    Medications independent    Meal Preparation independent    Housekeeping independent    Laundry independent    Shopping independent       Employment/    Employment Status employed full-time    Employment/ Comments 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

## 2023-10-06 ENCOUNTER — TELEPHONE (OUTPATIENT)
Dept: NEUROSURGERY | Facility: CLINIC | Age: 63
End: 2023-10-06
Payer: COMMERCIAL

## 2023-10-06 ENCOUNTER — READMISSION MANAGEMENT (OUTPATIENT)
Dept: CALL CENTER | Facility: HOSPITAL | Age: 63
End: 2023-10-06
Payer: COMMERCIAL

## 2023-10-06 VITALS
RESPIRATION RATE: 20 BRPM | SYSTOLIC BLOOD PRESSURE: 95 MMHG | OXYGEN SATURATION: 94 % | BODY MASS INDEX: 28.76 KG/M2 | HEART RATE: 80 BPM | HEIGHT: 62 IN | TEMPERATURE: 98.7 F | DIASTOLIC BLOOD PRESSURE: 60 MMHG | WEIGHT: 156.31 LBS

## 2023-10-06 RX ORDER — BUTALBITAL, ACETAMINOPHEN AND CAFFEINE 50; 325; 40 MG/1; MG/1; MG/1
1-2 TABLET ORAL EVERY 6 HOURS PRN
Qty: 10 TABLET | Refills: 0 | Status: SHIPPED | OUTPATIENT
Start: 2023-10-06

## 2023-10-06 RX ADMIN — FLUOXETINE 80 MG: 20 CAPSULE ORAL at 08:03

## 2023-10-06 RX ADMIN — ATORVASTATIN CALCIUM 20 MG: 20 TABLET, FILM COATED ORAL at 08:04

## 2023-10-06 RX ADMIN — HYDROCHLOROTHIAZIDE 25 MG: 12.5 TABLET ORAL at 08:04

## 2023-10-06 RX ADMIN — Medication 5000 UNITS: at 08:04

## 2023-10-06 RX ADMIN — BUTALBITAL, ACETAMINOPHEN, AND CAFFEINE 2 TABLET: 50; 325; 40 TABLET ORAL at 08:03

## 2023-10-06 RX ADMIN — Medication 10 ML: at 08:04

## 2023-10-06 RX ADMIN — LOSARTAN POTASSIUM 100 MG: 50 TABLET, FILM COATED ORAL at 08:04

## 2023-10-06 NOTE — DISCHARGE INSTRUCTIONS
May return to work after neurosurgery clearance in two weeks follow up.  Monitor blood pressure at home, may need to take half dose of blood pressure medication in next following days until normal activity and blood pressure resumes.

## 2023-10-06 NOTE — OUTREACH NOTE
Prep Survey      Flowsheet Row Responses   Denominational facility patient discharged from? [facility]   Is LACE score < 7 ? Yes   Eligibility [facility]   Date of Admission 10/04/23   Date of Discharge 10/06/23   Discharge Disposition Home or Self Care   Discharge diagnosis Subarachnoid bleed-Multiple closed fractures of facial bone,fall   Does the patient have one of the following disease processes/diagnoses(primary or secondary)? Other   Does the patient have Home health ordered? No   Is there a DME ordered? No   Prep survey completed? Yes            AD LOWE - Registered Nurse

## 2023-10-06 NOTE — PLAN OF CARE
Problem: Fall Injury Risk  Goal: Absence of Fall and Fall-Related Injury  Outcome: Ongoing, Not Progressing  Intervention: Identify and Manage Contributors  Recent Flowsheet Documentation  Taken 10/6/2023 0530 by Brianda Baxter RN  Medication Review/Management: medications reviewed  Taken 10/6/2023 0400 by Brianda Baxter RN  Medication Review/Management: medications reviewed  Taken 10/6/2023 0200 by Brianda Baxter RN  Medication Review/Management: medications reviewed  Taken 10/6/2023 0000 by Brianda Baxter RN  Medication Review/Management: medications reviewed  Taken 10/5/2023 2200 by Brianda Baxter RN  Medication Review/Management: medications reviewed  Taken 10/5/2023 2000 by Brianda Baxter RN  Medication Review/Management: medications reviewed  Intervention: Promote Injury-Free Environment  Recent Flowsheet Documentation  Taken 10/6/2023 0530 by Brianda Baxter RN  Safety Promotion/Fall Prevention:   activity supervised   clutter free environment maintained   fall prevention program maintained   lighting adjusted  Taken 10/6/2023 0400 by Brianda Baxter RN  Safety Promotion/Fall Prevention:   activity supervised   clutter free environment maintained   fall prevention program maintained   lighting adjusted  Taken 10/6/2023 0200 by Brianda Baxter RN  Safety Promotion/Fall Prevention:   activity supervised   clutter free environment maintained   fall prevention program maintained   lighting adjusted  Taken 10/6/2023 0000 by Brianda Baxter RN  Safety Promotion/Fall Prevention:   activity supervised   fall prevention program maintained   clutter free environment maintained   lighting adjusted   room organization consistent   safety round/check completed  Taken 10/5/2023 2200 by Brianda Baxter RN  Safety Promotion/Fall Prevention: activity supervised  Taken 10/5/2023 2000 by Brianda Baxter RN  Safety Promotion/Fall Prevention:   activity supervised   clutter free environment  maintained   fall prevention program maintained   lighting adjusted   safety round/check completed   room organization consistent     Problem: Skin Injury Risk Increased  Goal: Skin Health and Integrity  Outcome: Ongoing, Not Progressing  Intervention: Optimize Skin Protection  Recent Flowsheet Documentation  Taken 10/6/2023 0000 by Brianda Baxter RN  Pressure Reduction Techniques: heels elevated off bed  Pressure Reduction Devices: heel offloading device utilized  Skin Protection: adhesive use limited  Taken 10/5/2023 2000 by Brianda Baxter RN  Pressure Reduction Techniques: heels elevated off bed  Pressure Reduction Devices: heel offloading device utilized  Skin Protection: adhesive use limited     Problem: Pain Acute  Goal: Acceptable Pain Control and Functional Ability  Outcome: Ongoing, Not Progressing  Intervention: Prevent or Manage Pain  Recent Flowsheet Documentation  Taken 10/6/2023 0530 by Brianda Baxter RN  Medication Review/Management: medications reviewed  Taken 10/6/2023 0400 by Brianda Baxter RN  Medication Review/Management: medications reviewed  Taken 10/6/2023 0200 by Brianda Baxter RN  Medication Review/Management: medications reviewed  Taken 10/6/2023 0000 by Brianda Baxter RN  Sensory Stimulation Regulation: auditory stimulation minimized  Sleep/Rest Enhancement: awakenings minimized  Medication Review/Management: medications reviewed  Taken 10/5/2023 2200 by Brianda Baxter RN  Medication Review/Management: medications reviewed  Taken 10/5/2023 2000 by Brianda Baxter RN  Sensory Stimulation Regulation: auditory stimulation minimized  Sleep/Rest Enhancement: awakenings minimized  Medication Review/Management: medications reviewed  Intervention: Optimize Psychosocial Wellbeing  Recent Flowsheet Documentation  Taken 10/6/2023 0400 by Brianda Baxter RN  Supportive Measures: active listening utilized  Diversional Activities: television  Taken 10/6/2023 0000 by Sahil  JONATHAN Lamar  Supportive Measures:   active listening utilized   problem-solving facilitated  Diversional Activities:   television   smartphone  Taken 10/5/2023 2000 by Brianda Baxter RN  Supportive Measures: positive reinforcement provided  Diversional Activities: television     Problem: Cerebral Tissue Perfusion (Stroke, Hemorrhagic)  Goal: Optimal Cerebral Tissue Perfusion  Outcome: Ongoing, Not Progressing  Intervention: Protect and Optimize Cerebral Perfusion  Recent Flowsheet Documentation  Taken 10/6/2023 0000 by Brianda Baxter RN  Sensory Stimulation Regulation: auditory stimulation minimized  Cerebral Perfusion Promotion: blood pressure monitored  Taken 10/5/2023 2000 by Brianda Baxter RN  Sensory Stimulation Regulation: auditory stimulation minimized  Cerebral Perfusion Promotion: blood pressure monitored     Problem: Cognitive Impairment (Stroke, Hemorrhagic)  Goal: Optimal Cognitive Function  Outcome: Ongoing, Not Progressing  Intervention: Optimize Cognitive Function  Recent Flowsheet Documentation  Taken 10/6/2023 0000 by Brianda Baxter RN  Sensory Stimulation Regulation: auditory stimulation minimized  Reorientation Measures: clock in view  Taken 10/5/2023 2000 by Brianda Baxter RN  Sensory Stimulation Regulation: auditory stimulation minimized  Reorientation Measures: clock in view     Problem: Functional Ability Impaired (Stroke, Hemorrhagic)  Goal: Optimal Functional Ability  Outcome: Ongoing, Not Progressing  Intervention: Optimize Functional Ability  Recent Flowsheet Documentation  Taken 10/5/2023 2000 by Brianda Baxter RN  Activity Management: activity encouraged     Problem: Swallowing Impairment (Stroke, Hemorrhagic)  Goal: Optimal Eating and Swallowing Without Aspiration  Outcome: Ongoing, Not Progressing     Problem: Urinary Elimination Impaired (Stroke, Hemorrhagic)  Goal: Effective Urinary Elimination  Outcome: Ongoing, Not Progressing  Intervention: Promote Effective  Bladder Elimination  Recent Flowsheet Documentation  Taken 10/5/2023 2000 by Brianda Baxter, RN  Urinary Elimination Promotion:   toileting scheduled   toileting offered   Goal Outcome Evaluation:                 Patient alert and oriented x 4 throughout the shift, patient neurologically intact through out shift, patient denies distress,plan of care is ongoing

## 2023-10-06 NOTE — TELEPHONE ENCOUNTER
"Left message for patient to call the office to schedule a 2 week follow up from her most recent hospital admission on 10/4/23. \" Patient will also need to get a new CT scan closer to that Follow up Appointment\"  referral has been put in.  "

## 2023-10-06 NOTE — NURSING NOTE
Dr. Cortes bedside to go over discharge questions with patient.  Pt understands what to monitor at home upon discharge and when to follow up.  Blood pressure slightly lower than pt baseline, pt instructed to monitor BP at home and take half dose of prescribed antihypertensive medications if low.   Incision care understood by patient. She will call to schedule appt with neurosurgery to follow up in two weeks.  Pt discharge home with all belongings and transported home by , Bobby.

## 2023-10-06 NOTE — DISCHARGE SUMMARY
[facility] Medicine Services  Discharge Summary    Date of Service: 10/06/23  Patient condition: Stable    Patient Name: Shanon Duvall  : 1960  MRN: [id number]    Date of Admission: 10/4/2023  Discharge Diagnosis: posttraumatic subarachnoid hemorrhage and intraparenchymal hemorrhage  Date of Discharge:  10/06/23    Primary Care Physician: Shay Espinoza MD      Presenting Problem:   Subarachnoid hemorrhage [I60.9]  Subarachnoid bleed [I60.9]  Fall, initial encounter [W19.XXXA]  Laceration of forehead, initial encounter [S01.81XA]  Traumatic right-sided intracerebral hemorrhage without loss of consciousness, initial encounter [S06.340A]  Multiple closed fractures of facial bone, initial encounter [S02.92XA]    Active and Resolved Hospital Problems:  Active Hospital Problems    Diagnosis POA    **Subarachnoid bleed [I60.9] Yes      Resolved Hospital Problems   No resolved problems to display.         Hospital Course     Hospital Course:  Shanon Duvall is a 62 y.o. female Patient is a 62-year-old female who presented after mechanical fall was found to have posttraumatic subarachnoid hemorrhage and intraparenchymal hemorrhage in the right frontal lobe.  She seen initially in ICU and seen by neurosurgery.  Patient has been stable.  Was cleared by neurosurgery for discharge follow-up in 2 weeks with repeat CT.  Patient to hold aspirin until follow-up.  She had a recent cardiac cath which showed nonobstructive CAD and will follow-up with regular cardiologist as an outpatient.    Smoking cessation counseling 4 minutes. Not sure if she will quit         DISCHARGE Follow Up Recommendations for labs and diagnostics:       Reasons For Change In Medications and Indications for New Medications:      Day of Discharge     Vital Signs:  Temp:  [97.9 °F (36.6 °C)-98.9 °F (37.2 °C)] 98.7 °F (37.1 °C)  Heart Rate:  [69-87] 80  Resp:  [12-20] 20  BP: ()/(53-82) 95/60    Physical Exam:  Physical  Exam   General: No acute distress  HEENT: Neck supple, normal oral mucosa, no masses, no lymphadenopathy. Right periorbital bruising. Right forehead stitches   Lungs: Clear bilaterally, no wheezing, no crackles, no rhonchi. Equal excursions.   CV - Normal S1/S2, no murmur, regular rate and rhythm   Abdomen - Soft, nontender, nondistended, normal bowel sounds  Extremities - no edema, no erythema  Neuro - No focal weakness, normal sensation  Psych - Alert and oriented x3  Skin - no wounds or lesions.         Pertinent  and/or Most Recent Results     LAB RESULTS:      Lab 10/04/23  2024 09/30/23  1240   WBC 12.60* 9.00   HEMOGLOBIN 15.2 15.9   HEMATOCRIT 46.5 47.7*   PLATELETS 243 278   NEUTROS ABS 9.80*  --    LYMPHS ABS 1.80  --    MONOS ABS 0.80  --    EOS ABS 0.20  --    MCV 93.3 93.2   PROTIME 10.8 10.8   APTT 26.5  --          Lab 10/05/23  0446 10/04/23  2024 09/30/23  1240   SODIUM  --  138 137   POTASSIUM  --  4.0 3.9   CHLORIDE  --  102 99   CO2  --  26.0 24.2   ANION GAP  --  10.0 13.8   BUN  --  14 18   CREATININE  --  0.70 0.74   EGFR  --  96.7 90.5   GLUCOSE  --  111* 83   CALCIUM  --  9.3 9.5   MAGNESIUM  --  2.2  --    PHOSPHORUS  --  3.0  --    HEMOGLOBIN A1C 5.10  --   --          Lab 10/04/23  2024   TOTAL PROTEIN 6.5   ALBUMIN 4.0   GLOBULIN 2.5   ALT (SGPT) 25   AST (SGOT) 27   BILIRUBIN 0.6   ALK PHOS 81         Lab 10/04/23  2024 09/30/23  1240   PROTIME 10.8 10.8   INR 0.99 0.99         Lab 10/05/23  0446 09/30/23  1240   CHOLESTEROL 168 211*   LDL CHOL 107* 124*   HDL CHOL 36* 36*   TRIGLYCERIDES 140 291*             Brief Urine Lab Results  (Last result in the past 365 days)        Color   Clarity   Blood   Leuk Est   Nitrite   Protein   CREAT   Urine HCG        03/08/23 1407 Yellow   Clear   Negative   Moderate (2+)   Negative   Negative                 Microbiology Results (last 10 days)       ** No results found for the last 240 hours. **            XR Skull < 4 View    Result Date:  10/4/2023  Impression: The frontal bone fracture seen on CT scan of 10/4/2023 is poorly evaluated on this plain film examination. No gross pathology identified. Electronically Signed: Tano Harper MD  10/4/2023 7:11 PM EDT  Workstation ID: [id number]    CT Head Without Contrast    Result Date: 10/5/2023  Impression: Impression: 1.Stable right-sided subarachnoid hemorrhage. 2.Decrease in right periorbital soft tissue swelling. 3.No significant change otherwise. Electronically Signed: Gerard Sumner MD  10/5/2023 2:43 PM CDT  Workstation ID: [id number]    CT Head Without Contrast    Result Date: 10/4/2023  Impression: Impression: No significant interval change in right-sided intracranial hemorrhage as above. Close follow-up recommended No change in acute fractures as above Electronically Signed: Fran Tobar MD  10/4/2023 9:09 PM EDT  Workstation ID: [id number]    CT Head Without Contrast    Result Date: 10/4/2023  Impression: Impression: Intracranial hemorrhage is present, with areas of presumed traumatic subarachnoid hemorrhage present within the right sylvian fissure and along the right inferior frontal lobe which also demonstrates small hemorrhagic contusion. There is mild associated right-sided cerebral edema without global mass effect or midline shift. Acute right-sided facial bone fractures including nondisplaced right frontal bone fracture extending across the frontal sinus and involving the nasion as well as the orbital roof. There is also a minimally displaced acute fracture of the right lamina papyracea and tiny nondisplaced fracture of the right zygoma at its root. The orbits are otherwise normal without evidence of intraconal hemorrhage or mass effect. No acute fracture or traumatic malalignment of the cervical spine. Above findings relayed to the emergency room by Connor Cavazos via the epic messaging system at 2:57 p.m. 10/4/2023 Electronically Signed: Caden Cavazos MD  10/4/2023 2:58 PM EDT   Workstation ID: [id number]    CT Cervical Spine Without Contrast    Result Date: 10/4/2023  Impression: Impression: Intracranial hemorrhage is present, with areas of presumed traumatic subarachnoid hemorrhage present within the right sylvian fissure and along the right inferior frontal lobe which also demonstrates small hemorrhagic contusion. There is mild associated right-sided cerebral edema without global mass effect or midline shift. Acute right-sided facial bone fractures including nondisplaced right frontal bone fracture extending across the frontal sinus and involving the nasion as well as the orbital roof. There is also a minimally displaced acute fracture of the right lamina papyracea and tiny nondisplaced fracture of the right zygoma at its root. The orbits are otherwise normal without evidence of intraconal hemorrhage or mass effect. No acute fracture or traumatic malalignment of the cervical spine. Above findings relayed to the emergency room by Connor Cavazos via the epic messaging system at 2:57 p.m. 10/4/2023 Electronically Signed: Caden Cavazos MD  10/4/2023 2:58 PM EDT  Workstation ID: [id number]    XR Chest 1 View    Result Date: 10/4/2023  Impression: Impression: No acute process. Electronically Signed: Stacey Martino MD  10/4/2023 4:20 PM EDT  Workstation ID: [id number]    CT Cardiac Calcium Score Without Dye    Result Date: 9/28/2023  Impression: Impression: Calcium score 633.9 Calcium Score Interpretation 0 -- Negative examination, no identifiable atherosclerotic plaque.  Very low risk of cardiac events in the next 5 years. 1-10 -- Minimal plaque burden.  Low risk of cardiac events in the next 5 years. 11- 100 -- Mild Plaque burden.  Mild risk of cardiac events in the next 5 years. 101 - 400 -- Moderate plaque burden.  Moderate risk of cardiac events in the next 5 years. Over 400 -- Extensive plaque burden.  High risk of cardiac events in the next 5 years. Electronically Signed: Ceasar Negrete MD   9/28/2023 4:36 PM EDT  Workstation ID: [id number]    CT Facial Bones Without Contrast    Result Date: 10/4/2023  Impression: Impression: Intracranial hemorrhage is present, with areas of presumed traumatic subarachnoid hemorrhage present within the right sylvian fissure and along the right inferior frontal lobe which also demonstrates small hemorrhagic contusion. There is mild associated right-sided cerebral edema without global mass effect or midline shift. Acute right-sided facial bone fractures including nondisplaced right frontal bone fracture extending across the frontal sinus and involving the nasion as well as the orbital roof. There is also a minimally displaced acute fracture of the right lamina papyracea and tiny nondisplaced fracture of the right zygoma at its root. The orbits are otherwise normal without evidence of intraconal hemorrhage or mass effect. No acute fracture or traumatic malalignment of the cervical spine. Above findings relayed to the emergency room by Connor Cavazos via the epic messaging system at 2:57 p.m. 10/4/2023 Electronically Signed: Caden Cavazos MD  10/4/2023 2:58 PM EDT  Workstation ID: [id number]             Results for orders placed during the hospital encounter of 08/23/23    Adult Transthoracic Echo Complete W/ Cont if Necessary Per Protocol    Interpretation Summary    Left ventricular systolic function is normal. Left ventricular ejection fraction appears to be 56 - 60%.    Left ventricular diastolic function is consistent with (grade I) impaired relaxation.    Estimated right ventricular systolic pressure from tricuspid regurgitation is normal (<35 mmHg).      Labs Pending at Discharge:      Procedures Performed           Consults:   Consults       Date and Time Order Name Status Description    10/5/2023 12:25 PM Inpatient Hospitalist Consult      10/4/2023  4:06 PM Inpatient Neurosurgery Consult Completed               Discharge Details        Discharge Medications         New Medications        Instructions Start Date   butalbital-acetaminophen-caffeine -40 MG per tablet  Commonly known as: FIORICET, ESGIC   1-2 tablets, Oral, Every 6 Hours PRN             Continue These Medications        Instructions Start Date   atorvastatin 20 MG tablet  Commonly known as: LIPITOR   20 mg, Oral, Daily      FLUoxetine 40 MG capsule  Commonly known as: PROzac   80 mg, Oral, Daily      losartan-hydrochlorothiazide 100-25 MG per tablet  Commonly known as: HYZAAR   Take 1 tablet by mouth Daily.      Vitamin D 50 MCG (2000 UT) tablet   2,000 Units, Oral, Daily             Stop These Medications      aspirin 325 MG tablet              No Known Allergies      Discharge Disposition: home with OP follow up  Home or Self Care    Diet:  Hospital:  Diet Order   Procedures    Diet: Cardiac Diets; Healthy Heart (2-3 Na+); Texture: Regular Texture (IDDSI 7); Fluid Consistency: Thin (IDDSI 0)         Discharge Activity:         CODE STATUS:  Code Status and Medical Interventions:   Ordered at: 10/04/23 1606     Code Status (Patient has no pulse and is not breathing):    CPR (Attempt to Resuscitate)     Medical Interventions (Patient has pulse or is breathing):    Full Support         Future Appointments   Date Time Provider Department Center   11/20/2023  9:00 AM Liliana Thrasher APRN [facility]   11/29/2023 11:30 AM Shay Espinoza MD [facility]       Additional Instructions for the Follow-ups that You Need to Schedule       CT Head Without Contrast   Oct 19, 2023      Release to patient: Routine Release                Time spent on Discharge including face to face service:  31 minutes    This patient has been  and discussed with . 10/06/23      Signature: Electronically signed by Nasir Cortes MD, 10/06/23, 11:10 EDT.  [facility] Hospitalist Team

## 2023-10-09 ENCOUNTER — TRANSITIONAL CARE MANAGEMENT TELEPHONE ENCOUNTER (OUTPATIENT)
Dept: CALL CENTER | Facility: HOSPITAL | Age: 63
End: 2023-10-09
Payer: COMMERCIAL

## 2023-10-09 NOTE — OUTREACH NOTE
Call Center TCM Note      Flowsheet Row Responses   [facility] patient discharged from? [facility]   Does the patient have one of the following disease processes/diagnoses(primary or secondary)? Other   TCM attempt successful? Yes   Call start time 1248   Call end time 1256   Discharge diagnosis Subarachnoid bleed-Multiple closed fractures of facial bone,fall   Meds reviewed with patient/caregiver? Yes   Is the patient having any side effects they believe may be caused by any medication additions or changes? No   Does the patient have all medications ordered at discharge? Yes   Is the patient taking all medications as directed (includes completed medication regime)? Yes   Does the patient have an appointment with their PCP within 7-14 days of discharge? No   Nursing Interventions PCP office requested to make appointment - message sent   Has home health visited the patient within 72 hours of discharge? N/A   Psychosocial issues? No   Did the patient receive a copy of their discharge instructions? Yes   Nursing interventions Reviewed instructions with patient   What is the patient's perception of their health status since discharge? Improving   Is the patient/caregiver able to teach back signs and symptoms related to disease process for when to call PCP? Yes   Is the patient/caregiver able to teach back signs and symptoms related to disease process for when to call 911? Yes   Is the patient/caregiver able to teach back the hierarchy of who to call/visit for symptoms/problems? PCP, Specialist, Home health nurse, Urgent Care, ED, 911 Yes   If the patient is a current smoker, are they able to teach back resources for cessation? [id number]   TCM call completed? Yes   Call end time 1256   Would this patient benefit from a Referral to Amb Social Work? No   Is the patient interested in additional calls from an ambulatory ? No            Toña Vaughan LPN    10/9/2023, 12:59 EDT

## 2023-10-09 NOTE — PROGRESS NOTES
Spoke with patient and scheduled a hospital follow up appt with Dr Valladares on 10/19/2023 at 1:30pm.

## 2023-10-11 NOTE — PROGRESS NOTES
Subjective     Chief Complaint   Patient presents with    Head Injury     Follow up            HPI: Shanon Duvall is a 62 y.o. female with hypertension who presents for follow-up on posttraumatic intracranial hemorrhage.  Patient doing well since discharge from the hospital.  She reports mild intermittent headaches and dizziness, but these are well controlled.  She still have some mild right periorbital swelling.  She denies acute visual changes, speech difficulties, weakness, and incoordination.  She also denies watery nasal drainage.  She did have a vascular screening study done recently that showed moderate to severe stenosis of the left internal carotid artery.        PMH:  Past Medical History:   Diagnosis Date    Carotid stenosis, left 2023    Hyperlipidemia     Hypertension     Subarachnoid bleed 10/04/2023         Current Outpatient Medications:     atorvastatin (LIPITOR) 20 MG tablet, Take 1 tablet by mouth Daily., Disp: 90 tablet, Rfl: 3    Cholecalciferol (VITAMIN D) 50 MCG (2000 UT) tablet, Take 1 tablet by mouth Daily., Disp: , Rfl:     FLUoxetine (PROzac) 40 MG capsule, Take 2 capsules by mouth Daily., Disp: 180 capsule, Rfl: 1    losartan-hydrochlorothiazide (HYZAAR) 100-25 MG per tablet, Take 1 tablet by mouth Daily., Disp: 90 tablet, Rfl: 3     No Known Allergies     Past Surgical History:   Procedure Laterality Date    AUGMENTATION MAMMAPLASTY Bilateral 1999    CARDIAC CATHETERIZATION  20 years ago approx    I was in rehab, developed chest pain, sent to [facility], had a heart cath. There was some blockage but insignificant. No treatment.    CARDIAC CATHETERIZATION N/A 10/3/2023    Procedure: LEFT HEART CATH with possible PCI,radial;  Surgeon: Ranjit Crespo DO;  Location: [facility] CATH INVASIVE LOCATION;  Service: Cardiovascular;  Laterality: N/A;  Local and IV sedation  !!! RADIAL !!!        Family History   Problem Relation Age of Onset    Breast cancer Maternal Aunt          "50's    Coronary artery disease Father     Heart attack Father          8 years ago, probable heart  80 years old    Hyperlipidemia Father     Hypertension Sister         90% circumflex artery stent placed ? Dr. Brady    Heart disease Sister     Hypertension Brother     Hyperlipidemia Brother     Parkinsonism Half-Brother          Social Hx:  Social History     Tobacco Use   Smoking Status Some Days    Packs/day: 0.25    Years: 0.50    Additional pack years: 0.00    Total pack years: 0.13    Types: Cigarettes   Smokeless Tobacco Never   Tobacco Comments    intermittent smoking for 40 years      Alcohol Use: Not At Risk (10/4/2023)    AUDIT-C     Frequency of Alcohol Consumption: Never     Average Number of Drinks: Patient does not drink     Frequency of Binge Drinking: Never      Social History     Substance and Sexual Activity   Drug Use Never          Review of Systems   Constitutional:  Positive for activity change.   HENT:  Positive for tinnitus.    Eyes: Negative.    Respiratory: Negative.     Cardiovascular: Negative.    Gastrointestinal: Negative.    Endocrine: Negative.    Genitourinary: Negative.    Musculoskeletal:  Positive for back pain (thooracic/flank).   Skin: Negative.    Allergic/Immunologic: Negative.    Neurological:  Positive for headaches.   Hematological: Negative.    Psychiatric/Behavioral:  Positive for decreased concentration and sleep disturbance. The patient is nervous/anxious.          Objective     /78   Pulse 81   Ht 157.5 cm (62\")   Wt 68.9 kg (152 lb)   BMI 27.80 kg/m²    Body mass index is 27.8 kg/m².      Physical Exam  Vitals reviewed.   Constitutional:       General: She is not in acute distress.     Appearance: Normal appearance. She is well-developed and well-groomed.   HENT:      Head: Normocephalic and atraumatic.   Eyes:      General: Lids are normal.      Extraocular Movements: Extraocular movements intact.      Pupils: Pupils are equal, round, and reactive " to light.   Cardiovascular:      Rate and Rhythm: Normal rate and regular rhythm.      Pulses: Normal pulses.   Pulmonary:      Effort: Pulmonary effort is normal. No respiratory distress.   Musculoskeletal:         General: No swelling or tenderness. Normal range of motion.   Skin:     General: Skin is warm and dry.      Findings: No bruising or rash.   Neurological:      General: No focal deficit present.      Mental Status: She is oriented to person, place, and time.      Sensory: Sensation is intact.      Motor: Motor strength is normal.Motor function is intact.      Deep Tendon Reflexes: Reflexes are normal and symmetric.      Comments:        Psychiatric:         Speech: Speech normal.        Neurological Exam  Mental Status  Awake, alert and oriented to person, place and time. Oriented to person, place, and time. Speech is normal. Attention and concentration are normal.    Cranial Nerves  CN II: Visual acuity is normal. Visual fields full to confrontation.  CN III, IV, VI: Extraocular movements intact bilaterally. Normal lids and orbits bilaterally. Pupils equal round and reactive to light bilaterally.  CN V: Facial sensation is normal.  CN VII: Full and symmetric facial movement.  CN IX, X: Palate elevates symmetrically. Normal gag reflex.  CN XI: Shoulder shrug strength is normal.  CN XII: Tongue midline without atrophy or fasciculations.    Motor   Strength is 5/5 throughout all four extremities.    Sensory  Normal sensation.Sensation is intact to light touch, pinprick, vibration and proprioception in all four extremities.    Reflexes  Deep tendon reflexes are 2+ and symmetric in all four extremities.    Coordination  Right: Finger-to-nose normal.Left: Finger-to-nose normal.    Gait  Casual gait is normal including stance, stride, and arm swing.          Results Review  I personally reviewed and interpreted the images from the following studies:      CT Head Without Contrast    Result Date: 10/19/2023  CT  HEAD WO CONTRAST Date of Exam: 10/19/2023 10:17 AM EDT Indication: f/u on traumatic SAH and IPH. Comparison: CT head without contrast 10/5/2023 Technique: Axial CT images were obtained of the head without contrast administration.  Coronal reconstructions were performed.  Automated exposure control and iterative reconstruction methods were used. Findings: Previously seen subarachnoid hemorrhage abutting the inferior right frontal lobe extending into the right sylvian fissure appears nearly completely resolved. There is no hemorrhage in the sylvian fissure. There is a tiny amount of evolving subacute hemorrhage adjacent to the anterior right frontal lobe (image 24). No midline shift or mass effect. No new area of intraparenchymal hemorrhage. Area of low-attenuation related to parenchymal contusion involving the inferior right frontal lobe again noted. Posterior fossa without acute abnormality. Globes are symmetric. Fractures again noted involving the right frontal bone and the right superior orbital rim. Additional facial bone fractures better assessed on prior facial bone CT.     Impression: Impression: 1. Near complete resolution of subarachnoid hemorrhage with a small amount of residual subarachnoid hemorrhage abutting inferior right frontal lobe. 2. Parenchymal contusion in inferior right frontal lobe similar to prior study. 3. No new area of hemorrhage. 4. Fractures involving right frontal bone and right orbital rim, better assessed on prior facial bone CT. Electronically Signed: Jaleel Mederos MD  10/19/2023 10:35 AM EDT  Workstation ID: [id number]    CT Head Without Contrast    Result Date: 10/5/2023  CT HEAD WO CONTRAST Date of Exam: 10/5/2023 2:00 PM CDT Indication: Subarachnoid hemorrhage (SAH) 24-hour follow up. Comparison: 10/4/2023 Technique: Axial CT images were obtained of the head without contrast administration.  Coronal reconstructions were performed.  Automated exposure control and iterative  reconstruction methods were used. Findings: There is no evidence of acute territorial infarction. No significant change in right-sided subarachnoid hemorrhage within the sylvian fissure and extending along the anterior margin of the suprasellar cistern and right inferior frontal lobe. No new sites of hemorrhage are identified. Ventricles and CSF spaces are symmetric. No mass effect nor hydrocephalus. Brain parenchyma appears normal for age.  Fluid/blood is again noted within the right frontal sinus and anterior right ethmoid air cells.  Right frontal fractures are again noted. There has been decrease in right periorbital soft tissue edema.     Impression: Impression: 1.Stable right-sided subarachnoid hemorrhage. 2.Decrease in right periorbital soft tissue swelling. 3.No significant change otherwise. Electronically Signed: Gerard Sumner MD  10/5/2023 2:43 PM CDT  Workstation ID: [id number]    CT Head Without Contrast    Result Date: 10/4/2023  CT HEAD WO CONTRAST Date of Exam: 10/4/2023 8:46 PM EDT Indication: Follow-up for intracranial hemorrhage. Comparison: 10/4/2023 at 2:37 p.m. Technique: Axial CT images were obtained of the head without contrast administration.  Coronal reconstructions were performed.  Automated exposure control and iterative reconstruction methods were used. Findings: The ventricles are normal in size and midline. There is no significant change in right-sided subarachnoid hemorrhage along the sylvian fissure extending along the anterior margin of the suprasellar cistern as well as involving the right frontal lobe. Also small focal bone contusion in the right orbitofrontal lobe appears stable. There is no midline shift Again noticed fracture of the right frontal bone extending into the orbital roof involving the anterior wall of the right frontal sinus.     Impression: Impression: No significant interval change in right-sided intracranial hemorrhage as above. Close follow-up recommended No change  in acute fractures as above Electronically Signed: Fran Tobar MD  10/4/2023 9:09 PM EDT  Workstation ID: [id number]    CT Head Without Contrast    Result Date: 10/4/2023  CT FACIAL BONES WO CONTRAST, CT HEAD WO CONTRAST, CT CERVICAL SPINE WO CONTRAST Date of Exam: 10/4/2023 2:44 PM EDT Indication: Facial trauma. Comparison: None available. Technique: Axial CT images were obtained of the head, face and cervical spine without contrast administration.  Sagittal and coronal reconstructions were performed.  Automated exposure control and iterative reconstruction methods were used. Findings: CT head: There is evidence of subarachnoid hemorrhage involving the right sylvian fissure extending along the anterior margin of the suprasellar cistern as well as involving the right frontal lobe, with some component of small contusion present involving  the inferior right frontal lobe. Mild right frontal cerebral edema is present, without mass effect. The ventricles are normal in size and configuration. The orbits appear normal. CT face: There is acute fracture present spanning the right frontal bone and orbital roof, with involvement of the anterior wall of the right frontal sinus with associated presumed hemorrhage within the frontal sinus. There is also likely a subtle acute fracture involving the right zygoma, best appreciated on axial image 46, nondisplaced. The right frontal fracture likely also extends to involve the right nasion. The pterygoid plates are intact. There is no evidence of mandibular fracture. There is no evidence of intraconal hemorrhage or mass effect. The lamina papyracea on the right appears to demonstrate minimal acute fracture best appreciated on image 45 with some adjacent blood products within the ethmoid sinuses. The superficial facial soft tissues demonstrate some edema along the right frontal region. The orbits are otherwise normal with the extraocular muscles appearing intact and globes symmetric.  CT cervical spine: Cortical margins are intact and alignment is maintained, without evidence of acute fracture or traumatic malalignment. There is no suspicious lytic or sclerotic lesion. Minimal spondylosis changes are evident. The prevertebral soft tissues are normal. The dens is intact.     Impression: Impression: Intracranial hemorrhage is present, with areas of presumed traumatic subarachnoid hemorrhage present within the right sylvian fissure and along the right inferior frontal lobe which also demonstrates small hemorrhagic contusion. There is mild associated right-sided cerebral edema without global mass effect or midline shift. Acute right-sided facial bone fractures including nondisplaced right frontal bone fracture extending across the frontal sinus and involving the nasion as well as the orbital roof. There is also a minimally displaced acute fracture of the right lamina papyracea and tiny nondisplaced fracture of the right zygoma at its root. The orbits are otherwise normal without evidence of intraconal hemorrhage or mass effect. No acute fracture or traumatic malalignment of the cervical spine. Above findings relayed to the emergency room by Connor Cavazos via the epic messaging system at 2:57 p.m. 10/4/2023 Electronically Signed: Caden Cavazos MD  10/4/2023 2:58 PM EDT  Workstation ID: [id number]               Assessment & Plan     MDM: Shanon Duvall is a 62 y.o. female being seen for follow-up on posttraumatic intracranial hemorrhage and skull fracture.  Patient is neurologically stable with no focal deficits.  CT head done yesterday shows near complete resolution of the subarachnoid component of hemorrhage.  The right frontal lobe hematoma is stable.  There is no new hemorrhage present.  Patient has no complaints of CSF rhinorrhea.  Given her stability/improvement both clinically and radiographically I do not feel further serial imaging is required.  Patient may resume her home aspirin.   Instructed her to call our office or present to the hospital immediately if she experiences acute neurologic decline as before when she resumes this medication.    Regarding patient's left carotid stenosis seen on recent vascular study.  She is scheduled to undergo a dedicated angiography for more detailed evaluation.  I am referring her to neurology to establish care.      Patient is okay to return to work starting 10/30/2023 from a neurosurgical perspective.  She may follow-up with my office on an as-needed basis moving forward.  Patient is agreeable this plan.       Diagnosis Plan   1. Subarachnoid hemorrhage following injury, with loss of consciousness, subsequent encounter        2. Traumatic right-sided intracerebral hemorrhage with loss of consciousness, subsequent encounter        3. Carotid stenosis, asymptomatic, left  Ambulatory Referral to Neurology          Return if symptoms worsen or fail to improve.      Shanon HUMPHREY Eloina  reports that she has been smoking cigarettes. She has a 0.13 pack-year smoking history. She has never used smokeless tobacco.. I have educated her on the risk of diseases from using tobacco products such as cancer, COPD, and heart disease.        BMI is >= 25 and <30. (Overweight) The following options were offered after discussion;: exercise counseling/recommendations and nutrition counseling/recommendations         This patient was examined wearing appropriate personal protective equipment.            Jim Solitario PA-C    10/20/23  09:41 EDT      Part of this note may be an electronic transcription/translation of spoken language to printed text using the Dragon Dictation System.

## 2023-10-12 LAB
QT INTERVAL: 430 MS
QTC INTERVAL: 483 MS

## 2023-10-13 ENCOUNTER — TELEPHONE (OUTPATIENT)
Dept: NEUROSURGERY | Facility: CLINIC | Age: 63
End: 2023-10-13
Payer: COMMERCIAL

## 2023-10-13 NOTE — TELEPHONE ENCOUNTER
Called patient and let her know that we do not fill out FMLA paperwork unless they have surgery and she will need to obtain that from her PCP. Patient verbalized understanding.

## 2023-10-13 NOTE — TELEPHONE ENCOUNTER
PATIENT: MYLA REDDY    PROVIDER: ABELARDO CHEN    REASON: FMLA PAPERWORK    CALL BACK # [phone]     FAX # [phone]    PATIENT CALLED ASKING IF WE RECEIVED FMLA SHORT TERM DISABILITY PAPERWORK FROM [employer] FOR HER. SHE WAS SEEN IN THE ED ON 10/04/23 BY ABELARDO PRATHER. THE FMLA PAPERWORK WAS FAXED OVER LAST WEEK. THE PATIENT CANNOT GO BACK TO WORK UNTIL SHE IS SEEN FOR HER F/U ON 10/20/23. SHE NEEDS THE PAPERWORK FILLED OUT IN THE MEANTIME. PLEASE ADVISE. THANKS.

## 2023-10-19 ENCOUNTER — OFFICE VISIT (OUTPATIENT)
Dept: FAMILY MEDICINE CLINIC | Facility: CLINIC | Age: 63
End: 2023-10-19
Payer: COMMERCIAL

## 2023-10-19 ENCOUNTER — HOSPITAL ENCOUNTER (OUTPATIENT)
Dept: ULTRASOUND IMAGING | Facility: HOSPITAL | Age: 63
Discharge: HOME | End: 2023-10-19

## 2023-10-19 ENCOUNTER — HOSPITAL ENCOUNTER (OUTPATIENT)
Dept: CT IMAGING | Facility: HOSPITAL | Age: 63
Discharge: HOME | End: 2023-10-19
Payer: COMMERCIAL

## 2023-10-19 ENCOUNTER — TELEPHONE (OUTPATIENT)
Dept: FAMILY MEDICINE CLINIC | Facility: CLINIC | Age: 63
End: 2023-10-19

## 2023-10-19 VITALS
DIASTOLIC BLOOD PRESSURE: 80 MMHG | WEIGHT: 150 LBS | SYSTOLIC BLOOD PRESSURE: 118 MMHG | HEART RATE: 96 BPM | BODY MASS INDEX: 27.6 KG/M2 | OXYGEN SATURATION: 96 % | HEIGHT: 62 IN | RESPIRATION RATE: 18 BRPM

## 2023-10-19 DIAGNOSIS — E78.2 MIXED HYPERLIPIDEMIA: ICD-10-CM

## 2023-10-19 DIAGNOSIS — I65.22 LEFT CAROTID STENOSIS: ICD-10-CM
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
## 2023-10-19 DIAGNOSIS — R10.31 RIGHT LOWER QUADRANT ABDOMINAL PAIN: Primary | ICD-10-CM

## 2023-10-19 DIAGNOSIS — I25.10 ATHEROSCLEROSIS OF NATIVE CORONARY ARTERY OF NATIVE HEART WITHOUT ANGINA PECTORIS: ICD-10-CM

## 2023-10-19 DIAGNOSIS — S06.6X9D SUBARACHNOID HEMORRHAGE FOLLOWING INJURY, CONCUSSION, WITH LOSS OF CONSCIOUSNESS, SUBSEQUENT ENCOUNTER: Primary | ICD-10-CM

## 2023-10-19 DIAGNOSIS — S06.340A TRAUMATIC RIGHT-SIDED INTRACEREBRAL HEMORRHAGE WITHOUT LOSS OF CONSCIOUSNESS, INITIAL ENCOUNTER: ICD-10-CM

## 2023-10-19 DIAGNOSIS — I60.9 SUBARACHNOID HEMORRHAGE: ICD-10-CM
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
## 2023-10-19 LAB
BH CV XLRA MEAS - PAD LEFT ABI PT: 1.1
BH CV XLRA MEAS - PAD LEFT ARM: 125 MMHG
BH CV XLRA MEAS - PAD LEFT LEG PT: 135 MMHG
BH CV XLRA MEAS - PAD RIGHT ABI PT: 1
BH CV XLRA MEAS - PAD RIGHT ARM: 116 MMHG
BH CV XLRA MEAS - PAD RIGHT LEG PT: 125 MMHG
BH CV XLRA MEAS - PROX AO DIAM: 2.2 CM
BH CV XLRA MEAS LEFT MID CCA PSV: 89.9 CM/SEC
BH CV XLRA MEAS LEFT MID ICA PSV: 58.1 CM/SEC
BH CV XLRA MEAS RIGHT MID CCA PSV: 57.7 CM/SEC
BH CV XLRA MEAS RIGHT MID ICA PSV: 69.2 CM/SEC

## 2023-10-19 PROCEDURE — 70450 CT HEAD/BRAIN W/O DYE: CPT

## 2023-10-19 PROCEDURE — 93799 UNLISTED CV SVC/PROCEDURE: CPT

## 2023-10-19 NOTE — TELEPHONE ENCOUNTER
Caller: Shanon Duvall    Relationship: Self    Best call back number: [phone]     What orders are you requesting (i.e. lab or imaging): VEINOUS DOPPLER    In what timeframe would the patient need to come in: 10/20/23    Where will you receive your lab/imaging services: [facility]    Additional notes:

## 2023-10-19 NOTE — TELEPHONE ENCOUNTER
Caller: Shanon Duvall    Relationship: Self    Best call back number:     [phone] (Mobile)       What orders are you requesting (i.e. lab or imaging): CT SCAN OF RIGHT SIDE / FROM BREAST TO HIP / PAIN ON THAT SIDE      WANTED TO SEE IF YOU COULD ORDER THIS TO HAVE ALONG WITH THE SCAN ON HER HEAD TODAY     In what timeframe would the patient need to come in: ASAP     Where will you receive your lab/imaging services: [facility]

## 2023-10-20 ENCOUNTER — HOSPITAL ENCOUNTER (OUTPATIENT)
Dept: CT IMAGING | Facility: HOSPITAL | Age: 63
Discharge: HOME | End: 2023-10-20
Payer: COMMERCIAL

## 2023-10-20 ENCOUNTER — HOSPITAL ENCOUNTER (OUTPATIENT)
Dept: GENERAL RADIOLOGY | Facility: HOSPITAL | Age: 63
Discharge: HOME | End: 2023-10-20
Payer: COMMERCIAL

## 2023-10-20 ENCOUNTER — APPOINTMENT (OUTPATIENT)
Dept: OTHER | Facility: HOSPITAL | Age: 63
End: 2023-10-20
Payer: COMMERCIAL

## 2023-10-20 ENCOUNTER — OFFICE VISIT (OUTPATIENT)
Dept: NEUROSURGERY | Facility: CLINIC | Age: 63
End: 2023-10-20
Payer: COMMERCIAL

## 2023-10-20 ENCOUNTER — HOSPITAL ENCOUNTER (OUTPATIENT)
Dept: ULTRASOUND IMAGING | Facility: HOSPITAL | Age: 63
Discharge: HOME | End: 2023-10-20
Payer: COMMERCIAL

## 2023-10-20 VITALS
HEART RATE: 81 BPM | DIASTOLIC BLOOD PRESSURE: 78 MMHG | HEIGHT: 62 IN | WEIGHT: 152 LBS | SYSTOLIC BLOOD PRESSURE: 121 MMHG | BODY MASS INDEX: 27.97 KG/M2

## 2023-10-20 DIAGNOSIS — S06.349D TRAUMATIC RIGHT-SIDED INTRACEREBRAL HEMORRHAGE WITH LOSS OF CONSCIOUSNESS, SUBSEQUENT ENCOUNTER: ICD-10-CM

## 2023-10-20 DIAGNOSIS — R10.31 RIGHT LOWER QUADRANT ABDOMINAL PAIN: ICD-10-CM

## 2023-10-20 DIAGNOSIS — S06.6X9D SUBARACHNOID HEMORRHAGE FOLLOWING INJURY, WITH LOSS OF CONSCIOUSNESS, SUBSEQUENT ENCOUNTER: Primary | ICD-10-CM

## 2023-10-20 DIAGNOSIS — S06.6X9D SUBARACHNOID HEMORRHAGE FOLLOWING INJURY, CONCUSSION, WITH LOSS OF CONSCIOUSNESS, SUBSEQUENT ENCOUNTER: ICD-10-CM
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
## 2023-10-20 DIAGNOSIS — I65.22 CAROTID STENOSIS, ASYMPTOMATIC, LEFT: ICD-10-CM

## 2023-10-20 PROCEDURE — 73502 X-RAY EXAM HIP UNI 2-3 VIEWS: CPT

## 2023-10-20 PROCEDURE — 71250 CT THORAX DX C-: CPT

## 2023-10-20 PROCEDURE — 93880 EXTRACRANIAL BILAT STUDY: CPT

## 2023-10-20 PROCEDURE — 74176 CT ABD & PELVIS W/O CONTRAST: CPT

## 2023-10-21 NOTE — PROGRESS NOTES
Tell Shanon that she has some lung nodules that need close follow-up.  In 3 to 4 months lets repeat the CT scan.  Call us when she is ready to schedule it

## 2023-10-23 ENCOUNTER — TELEPHONE (OUTPATIENT)
Dept: FAMILY MEDICINE CLINIC | Facility: CLINIC | Age: 63
End: 2023-10-23
Payer: COMMERCIAL

## 2023-10-23 DIAGNOSIS — M85.80 OSTEOPENIA, UNSPECIFIED LOCATION: Primary | ICD-10-CM

## 2023-10-23 DIAGNOSIS — R91.1 LUNG NODULE SEEN ON IMAGING STUDY: Primary | ICD-10-CM

## 2023-10-23 NOTE — TELEPHONE ENCOUNTER
----- Message from Shlomo Valladares MD sent at 10/23/2023  7:29 AM EDT -----  Hip x-ray was negative for fracture.  Likely the point tenderness that she felt is either a bone bruise or bursitis.  We can continue to watch and see if she gets better.

## 2023-10-23 NOTE — TELEPHONE ENCOUNTER
Pt wants to know is you'll order her a DEXA before her physical. She has some time off and would like to have it done before her appt with you possibly.

## 2023-10-23 NOTE — TELEPHONE ENCOUNTER
----- Message from Shlomo Valladares MD sent at 10/23/2023  7:30 AM EDT -----  Ultrasound of her neck did not show significant stenosis that would need intervention.  She needs to take her statin medication now to prevent any future issues from developing.

## 2023-10-26 ENCOUNTER — HOSPITAL ENCOUNTER (OUTPATIENT)
Dept: BONE DENSITY | Facility: HOSPITAL | Age: 63
Discharge: HOME | End: 2023-10-26
Admitting: FAMILY MEDICINE
Payer: COMMERCIAL

## 2023-10-26 PROCEDURE — 77080 DXA BONE DENSITY AXIAL: CPT

## 2023-11-29 ENCOUNTER — OFFICE VISIT (OUTPATIENT)
Dept: FAMILY MEDICINE CLINIC | Facility: CLINIC | Age: 63
End: 2023-11-29
Payer: COMMERCIAL

## 2023-11-29 VITALS
OXYGEN SATURATION: 97 % | WEIGHT: 156 LBS | BODY MASS INDEX: 28.71 KG/M2 | HEIGHT: 62 IN | SYSTOLIC BLOOD PRESSURE: 140 MMHG | DIASTOLIC BLOOD PRESSURE: 76 MMHG | RESPIRATION RATE: 20 BRPM | HEART RATE: 87 BPM

## 2023-11-29 DIAGNOSIS — Z23 NEED FOR VACCINATION: ICD-10-CM

## 2023-11-29 DIAGNOSIS — I25.10 ATHEROSCLEROSIS OF NATIVE CORONARY ARTERY OF NATIVE HEART WITHOUT ANGINA PECTORIS: ICD-10-CM

## 2023-11-29 DIAGNOSIS — K21.00 GASTROESOPHAGEAL REFLUX DISEASE WITH ESOPHAGITIS WITHOUT HEMORRHAGE: ICD-10-CM

## 2023-11-29 DIAGNOSIS — I60.9 SUBARACHNOID BLEED: ICD-10-CM

## 2023-11-29 DIAGNOSIS — E55.9 VITAMIN D DEFICIENCY: ICD-10-CM

## 2023-11-29 DIAGNOSIS — Z00.00 PREVENTATIVE HEALTH CARE: Primary | ICD-10-CM

## 2023-11-29 DIAGNOSIS — I10 PRIMARY HYPERTENSION: ICD-10-CM

## 2023-11-29 DIAGNOSIS — Z12.31 ENCOUNTER FOR SCREENING MAMMOGRAM FOR MALIGNANT NEOPLASM OF BREAST: ICD-10-CM

## 2023-11-29 NOTE — PROGRESS NOTES
"Chief Complaint  Annual Exam    Subjective        Shanon Duvall presents to [facility]  History of Present Illness    The patient presents today for a follow-up.    She is doing well overall. She is due for a mammogram. She had a DEXA scan and was told that she has osteopenia. She does not want any infusions.    She had a fall in 2023. She was carrying a ladder in the [place]lo[place] and went to turn. She was in the ICU for 2 days. She had a subarachnoid and skull fracture. She was by herself when she fell. She was experiencing headaches for a while, but they have now resolved.    Her blood pressure is stable at home.    She has experienced acid reflux but denies any concerns.     She is experiencing issues with sciatica in her right hip that radiates into her right knee. She has never had it before.     She had a calcium score performed and her numbers were 633. She had a heart catheterization, and it showed a blockage, but not enough for a stent. She was put on atorvastatin, but she cannot take it because it locked up her joints. Her cholesterol was 210 to 220 mg/dL. She only took the simvastatin for 2 days.    She has not exercised for 2 to 3 months. She states that both of her horses  within 3 weeks of each other.    Her vision is doing well. She needs new glasses. She states that her eyelid does not open as well as the other eye does. She had her carotids checked and it was less than 70 percent. She was told to watch it.    She had lung nodules. She is a situational smoker. Her last CT scan was in 10/2023.    Her hearing has declined and she knows she needs hearing aids. She states that she did not like the ones that she got.    Objective   Vital Signs:  /76   Pulse 87   Resp 20   Ht 157.5 cm (62\")   Wt 70.8 kg (156 lb)   SpO2 97%   BMI 28.53 kg/m²   Estimated body mass index is 28.53 kg/m² as calculated from the following:    Height as of this encounter: 157.5 cm " "(62\").    Weight as of this encounter: 70.8 kg (156 lb).               Physical Exam  Constitutional:       Appearance: Normal appearance. She is well-developed and normal weight.   HENT:      Head: Normocephalic and atraumatic.      Right Ear: Tympanic membrane, ear canal and external ear normal.      Left Ear: Tympanic membrane, ear canal and external ear normal.      Nose: Nose normal.      Mouth/Throat:      Mouth: Mucous membranes are moist.      Pharynx: Oropharynx is clear. No oropharyngeal exudate.   Eyes:      Extraocular Movements: Extraocular movements intact.      Conjunctiva/sclera: Conjunctivae normal.      Pupils: Pupils are equal, round, and reactive to light.   Cardiovascular:      Rate and Rhythm: Normal rate and regular rhythm.      Pulses: Normal pulses.      Heart sounds: Normal heart sounds.   Pulmonary:      Effort: Pulmonary effort is normal.      Breath sounds: Normal breath sounds.   Abdominal:      General: Bowel sounds are normal.      Palpations: Abdomen is soft.   Musculoskeletal:         General: Normal range of motion.      Cervical back: Normal range of motion and neck supple.   Skin:     General: Skin is warm and dry.      Comments: Scar over right eyebrow.   Neurological:      General: No focal deficit present.      Mental Status: She is alert and oriented to person, place, and time. Mental status is at baseline.   Psychiatric:         Mood and Affect: Mood normal.         Behavior: Behavior normal.         Thought Content: Thought content normal.         Judgment: Judgment normal.        Result Review :                   Assessment and Plan     1. Preventative healthcare  - The patient is a 63-year-old white female who is in today for her preventative healthcare visit. She is basically pretty much up to date with everything except her mammogram. We are going to schedule her for that. She is also going to get some lab work.    2. Need for vaccination  - The patient probably needs " to get a Prevnar 20 sometime soon. If possible, we will give that to her today.    3. Encounter for screening mammogram  - The patient will be scheduled for a mammogram and will get that done soon.    4. Primary hypertension  - The patient's blood pressure today was 140/76 mmHg. Her home readings are reported to be good, so we will continue with current medicine.    5. Vitamin D deficiency  - We will recheck her vitamin D level and adjust therapy if needed.    6. Gastroesophageal reflux  - We will continue her use of antacids on an as needed basis. If that gets worse, she is usually put on PPIs or H2 blockers, but she has not needed that for a while.    7. Subarachnoid hemorrhage  - The patient had a fall a few months ago where she fell from a ladder and cut her right brow area, quite severely, had an orbital fracture, and then apparently had some subarachnoid bleeding. She was in the intensive care unit for several days, but she has fully recovered from the event and seems to be doing well. 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    8. Arteriosclerotic disease of the coronary arteries  - The patient had a very high coronary artery score on CT scanning above 600. That led to a stress test and eventually do a cardiac catheterization that revealed some blockage, but not enough that she needed any stenting or bypasses. Nonetheless, she was started on a statin that she did not tolerate very well. Her most recent lipid panel for some reason showed almost normal total cholesterol and she has not been on a statin. I am not quite sure why that dropped significantly like that. We will repeat that today or in the next few days and then make a decision whether or not she needs a statin or not.         Follow Up   Return in about 6 months (around 5/29/2024), or if symptoms worsen or fail to improve, for Recheck-6mos            MCW 1 year.  Patient was given instructions and counseling regarding her condition or  for health maintenance advice. Please see specific information pulled into the AVS if appropriate.       Transcribed from ambient dictation for Shay Espinoza MD by Libertad Nava.  11/29/23   15:13 EST    Patient or patient representative verbalized consent to the visit recording.  I have personally performed the services described in this document as transcribed by the above individual, and it is both accurate and complete.  Shay Espinoza MD  12/2/2023  21:14 EST

## 2023-11-30 ENCOUNTER — LAB (OUTPATIENT)
Dept: LAB | Facility: HOSPITAL | Age: 63
End: 2023-11-30
Payer: COMMERCIAL

## 2023-11-30 DIAGNOSIS — Z11.59 NEED FOR HEPATITIS C SCREENING TEST: ICD-10-CM

## 2023-11-30 DIAGNOSIS — E55.9 VITAMIN D DEFICIENCY: ICD-10-CM

## 2023-11-30 DIAGNOSIS — I10 PRIMARY HYPERTENSION: ICD-10-CM

## 2023-11-30 LAB
25(OH)D3 SERPL-MCNC: 43.6 NG/ML (ref 30–100)
ALBUMIN SERPL-MCNC: 4.5 G/DL (ref 3.5–5.2)
ALBUMIN/GLOB SERPL: 1.7 G/DL
ALP SERPL-CCNC: 91 U/L (ref 39–117)
ALT SERPL W P-5'-P-CCNC: 26 U/L (ref 1–33)
ANION GAP SERPL CALCULATED.3IONS-SCNC: 14.7 MMOL/L (ref 5–15)
AST SERPL-CCNC: 27 U/L (ref 1–32)
BACTERIA UR QL AUTO: ABNORMAL /HPF
BASOPHILS # BLD AUTO: 0.09 10*3/MM3 (ref 0–0.2)
BASOPHILS NFR BLD AUTO: 1.4 % (ref 0–1.5)
BILIRUB SERPL-MCNC: 0.6 MG/DL (ref 0–1.2)
BILIRUB UR QL STRIP: NEGATIVE
BUN SERPL-MCNC: 15 MG/DL (ref 8–23)
BUN/CREAT SERPL: 16.9 (ref 7–25)
CALCIUM SPEC-SCNC: 9.2 MG/DL (ref 8.6–10.5)
CHLORIDE SERPL-SCNC: 99 MMOL/L (ref 98–107)
CHOLEST SERPL-MCNC: 195 MG/DL (ref 0–200)
CLARITY UR: CLEAR
CO2 SERPL-SCNC: 27.3 MMOL/L (ref 22–29)
COLOR UR: ABNORMAL
CREAT SERPL-MCNC: 0.89 MG/DL (ref 0.57–1)
DEPRECATED RDW RBC AUTO: 42.1 FL (ref 37–54)
EGFRCR SERPLBLD CKD-EPI 2021: 72.1 ML/MIN/1.73
EOSINOPHIL # BLD AUTO: 0.34 10*3/MM3 (ref 0–0.4)
EOSINOPHIL NFR BLD AUTO: 5.3 % (ref 0.3–6.2)
ERYTHROCYTE [DISTWIDTH] IN BLOOD BY AUTOMATED COUNT: 12.3 % (ref 12.3–15.4)
GLOBULIN UR ELPH-MCNC: 2.7 GM/DL
GLUCOSE SERPL-MCNC: 91 MG/DL (ref 65–99)
GLUCOSE UR STRIP-MCNC: NEGATIVE MG/DL
HBA1C MFR BLD: 5.3 % (ref 4.8–5.6)
HCT VFR BLD AUTO: 45.9 % (ref 34–46.6)
HCV AB SER DONR QL: NORMAL
HDLC SERPL-MCNC: 38 MG/DL (ref 40–60)
HGB BLD-MCNC: 15.4 G/DL (ref 12–15.9)
HGB UR QL STRIP.AUTO: ABNORMAL
HOLD SPECIMEN: NORMAL
HYALINE CASTS UR QL AUTO: ABNORMAL /LPF
IMM GRANULOCYTES # BLD AUTO: 0.03 10*3/MM3 (ref 0–0.05)
IMM GRANULOCYTES NFR BLD AUTO: 0.5 % (ref 0–0.5)
KETONES UR QL STRIP: NEGATIVE
LDLC SERPL CALC-MCNC: 132 MG/DL (ref 0–100)
LDLC/HDLC SERPL: 3.39 {RATIO}
LEUKOCYTE ESTERASE UR QL STRIP.AUTO: ABNORMAL
LYMPHOCYTES # BLD AUTO: 1.52 10*3/MM3 (ref 0.7–3.1)
LYMPHOCYTES NFR BLD AUTO: 23.8 % (ref 19.6–45.3)
MCH RBC QN AUTO: 31.4 PG (ref 26.6–33)
MCHC RBC AUTO-ENTMCNC: 33.6 G/DL (ref 31.5–35.7)
MCV RBC AUTO: 93.5 FL (ref 79–97)
MONOCYTES # BLD AUTO: 0.41 10*3/MM3 (ref 0.1–0.9)
MONOCYTES NFR BLD AUTO: 6.4 % (ref 5–12)
NEUTROPHILS NFR BLD AUTO: 4 10*3/MM3 (ref 1.7–7)
NEUTROPHILS NFR BLD AUTO: 62.6 % (ref 42.7–76)
NITRITE UR QL STRIP: NEGATIVE
NRBC BLD AUTO-RTO: 0 /100 WBC (ref 0–0.2)
PH UR STRIP.AUTO: 7 [PH] (ref 5–8)
PLATELET # BLD AUTO: 267 10*3/MM3 (ref 140–450)
PMV BLD AUTO: 9.6 FL (ref 6–12)
POTASSIUM SERPL-SCNC: 4.1 MMOL/L (ref 3.5–5.2)
PROT SERPL-MCNC: 7.2 G/DL (ref 6–8.5)
PROT UR QL STRIP: NEGATIVE
RBC # BLD AUTO: 4.91 10*6/MM3 (ref 3.77–5.28)
RBC # UR STRIP: ABNORMAL /HPF
REF LAB TEST METHOD: ABNORMAL
SODIUM SERPL-SCNC: 141 MMOL/L (ref 136–145)
SP GR UR STRIP: 1.02 (ref 1–1.03)
SQUAMOUS #/AREA URNS HPF: ABNORMAL /HPF
T4 FREE SERPL-MCNC: 1.16 NG/DL (ref 0.93–1.7)
TRIGL SERPL-MCNC: 140 MG/DL (ref 0–150)
TSH SERPL DL<=0.05 MIU/L-ACNC: 1.05 UIU/ML (ref 0.27–4.2)
UROBILINOGEN UR QL STRIP: ABNORMAL
VIT B12 BLD-MCNC: 1140 PG/ML (ref 211–946)
VLDLC SERPL-MCNC: 25 MG/DL (ref 5–40)
WBC # UR STRIP: ABNORMAL /HPF
WBC NRBC COR # BLD AUTO: 6.39 10*3/MM3 (ref 3.4–10.8)

## 2023-11-30 PROCEDURE — 83036 HEMOGLOBIN GLYCOSYLATED A1C: CPT | Performed by: FAMILY MEDICINE

## 2023-11-30 PROCEDURE — 80050 GENERAL HEALTH PANEL: CPT

## 2023-11-30 PROCEDURE — 84439 ASSAY OF FREE THYROXINE: CPT | Performed by: FAMILY MEDICINE

## 2023-11-30 PROCEDURE — 82607 VITAMIN B-12: CPT | Performed by: FAMILY MEDICINE

## 2023-11-30 PROCEDURE — 36415 COLL VENOUS BLD VENIPUNCTURE: CPT

## 2023-11-30 PROCEDURE — 81001 URINALYSIS AUTO W/SCOPE: CPT

## 2023-11-30 PROCEDURE — 82306 VITAMIN D 25 HYDROXY: CPT

## 2023-11-30 PROCEDURE — 80061 LIPID PANEL: CPT | Performed by: FAMILY MEDICINE

## 2023-11-30 PROCEDURE — 86803 HEPATITIS C AB TEST: CPT

## 2023-12-20 ENCOUNTER — HOSPITAL ENCOUNTER (OUTPATIENT)
Dept: MAMMOGRAPHY | Facility: HOSPITAL | Age: 63
Discharge: HOME | End: 2023-12-20
Admitting: FAMILY MEDICINE
Payer: COMMERCIAL

## 2023-12-20 DIAGNOSIS — Z12.31 ENCOUNTER FOR SCREENING MAMMOGRAM FOR MALIGNANT NEOPLASM OF BREAST: ICD-10-CM

## 2023-12-20 PROCEDURE — 77063 BREAST TOMOSYNTHESIS BI: CPT

## 2023-12-20 PROCEDURE — 77067 SCR MAMMO BI INCL CAD: CPT

## 2024-01-31 NOTE — PROGRESS NOTES
Confirmed with Patient:  Site/Side: Right Wrist, Right Ring Finger  Insurance: [facility]  PCP: Dr. Nikita Love - Family Practice  Cardiologist: none  Address correct in chart: n/a  Phone as listed in chart: yes    CHECK BMI: <50 for AMCMA, [place], [place]: <40 FOR ANESTHESIA CASES;<48 LOCAL CASES .:  33.63  Message to [facility], [facility], [facility] for ok for urgent add on( 7 days or less): n/a  Case (Location and Date - if called in who it was scheduled with) :  Scheduled through Epic for February 23 - [place]  Added to [place] Calendar:  yes   Postop (location/date/time and who the appt is with):  [place] / March 4 / [address]: yes   PCP letter: n/a  Conf letter mailed on (date): Routed through patient's [employer] portal on January 31  Verbally reviewed location - [place]: yes  For ALL LOCATIONS (see below grid) patient will be called on February 22nd with arrival time: yes   Service to Family Practice: yes  Latex Allergy: no  Diabetic: no  Sleep Apnea: no  Phentermine: no  Review FLMA/Short Term Disability Process: not using    Call times: [place] - Mondays, called the Friday before in the afternoon                                   Fridays, called the day before in the afternoon                    [facility]: Called the Friday before in the afternoon                    [id number]: Called the day before in the afternoon                    [place]: Called the Monday before in the afternoon                    [facility] the Friday before in the afternoon   Rooming Tab(CC,VS,Pt Hx,Fall Screen)  Chief Complaint   Patient presents with   • Annual Exam       Subjective    Patient is here for a check up.  She feels great other then some extra weight.  ROS was entirely normal this year.  She is planning on getting back on a diet and starting some routine exercise after the holidays.  Overall she feels well and is working full-time.          I have reviewed and updated her medications, medical history and problem list during today's office visit.     Patient Care Team:  Shay Espinoza MD as PCP - General  Shay Espinoza MD as PCP - Family Medicine    Problem List Tab  Medications Tab  Synopsis Tab  Chart Review Tab  Care Everywhere Tab  Immunizations Tab  Patient History Tab    Social History     Tobacco Use   • Smoking status: Current Every Day Smoker   • Smokeless tobacco: Never Used   Substance Use Topics   • Alcohol use: No     Frequency: Never       Review of Systems   Constitutional: Negative.  Negative for diaphoresis, fatigue and fever.   HENT: Negative.  Negative for congestion, hearing loss, sinus pressure, tinnitus and trouble swallowing.    Eyes: Negative.    Respiratory: Negative.  Negative for cough, choking, chest tightness, shortness of breath and wheezing.    Cardiovascular: Negative.  Negative for chest pain and palpitations.   Gastrointestinal: Negative.  Negative for abdominal distention, abdominal pain and GERD.   Endocrine: Negative.    Genitourinary: Negative.  Negative for frequency.   Musculoskeletal: Negative.  Negative for arthralgias, gait problem, joint swelling, myalgias and neck stiffness.   Skin: Negative.  Negative for rash.   Allergic/Immunologic: Negative.  Negative for environmental allergies.   Neurological: Negative.  Negative for syncope and speech difficulty.   Hematological: Negative.  Negative for adenopathy.   Psychiatric/Behavioral: Negative for stress.   All other systems reviewed and are negative.      Objective  "    Rooming Tab(CC,VS,Pt Hx,Fall Screen)  /76 (BP Location: Left arm, Cuff Size: Adult)   Pulse 91   Temp 98.2 °F (36.8 °C) (Oral)   Resp 16   Ht 154.9 cm (61\")   Wt 70.3 kg (155 lb)   SpO2 97%   BMI 29.29 kg/m²     Body mass index is 29.29 kg/m².    Physical Exam   Constitutional: She is oriented to person, place, and time. She appears well-developed and well-nourished.   HENT:   Head: Normocephalic and atraumatic.   Right Ear: External ear normal.   Left Ear: External ear normal.   Nose: Nose normal.   Mouth/Throat: Oropharynx is clear and moist. No oropharyngeal exudate.   Eyes: Conjunctivae and EOM are normal. Pupils are equal, round, and reactive to light. Right eye exhibits no discharge. Left eye exhibits no discharge. No scleral icterus.   Neck: Normal range of motion. Neck supple. No JVD present. No thyromegaly present.   Cardiovascular: Normal rate, regular rhythm, normal heart sounds and intact distal pulses.   No murmur heard.  Pulmonary/Chest: Effort normal and breath sounds normal. No respiratory distress. She has no wheezes. She has no rales. She exhibits no tenderness.   Abdominal: Soft. Bowel sounds are normal. She exhibits no distension. There is no tenderness.   Genitourinary: Rectal exam shows guaiac negative stool.   Musculoskeletal: Normal range of motion. She exhibits no edema, tenderness or deformity.   Lymphadenopathy:     She has no cervical adenopathy.   Neurological: She is alert and oriented to person, place, and time.   Skin: Skin is warm and dry.   Psychiatric: She has a normal mood and affect. Her behavior is normal. Judgment and thought content normal.   Vitals reviewed.       Statin Choice Calculator  Data Reviewed:               Lab Results   Component Value Date    BUN 14 11/27/2019    CREATININE 0.75 11/27/2019    EGFRIFNONA 79 11/27/2019     11/27/2019    K 3.9 11/27/2019     11/27/2019    CALCIUM 9.3 11/27/2019    ALBUMIN 4.40 11/27/2019    BILITOT 0.2 " 11/27/2019    ALKPHOS 73 11/27/2019    AST 21 11/27/2019    ALT 20 11/27/2019    TRIG 197 (H) 11/27/2019    HDL 36 (L) 11/27/2019    VLDL 39.4 11/27/2019     (H) 11/27/2019    LDLHDL 2.85 11/27/2019    WBC 7.04 11/27/2019    RBC 4.74 11/27/2019    HCT 44.0 11/27/2019    MCV 92.8 11/27/2019    MCH 31.2 11/27/2019    TSH 1.170 11/27/2019    FREET4 1.21 11/27/2019    VLFD09SD 56.0 11/27/2019      Assessment/Plan   Order Review Tab  Health Maintenance Tab  Patient Plan/Order Tab  Diagnoses and all orders for this visit:    1. Preventative health care (Primary)  Assessment & Plan:  Patient here today for a complete history and physical.  Her past medical history is remarkable only for the disorders as listed below.  All of these are under excellent control.  She had a complete physical today which was normal and we have lab test pending which we anticipate will be normal.  She will try to follow a low-carb diet and start exercise.  Mammogram will be updated.  Colonoscopy is updated.  We will probably suggest a DEXA scan in the next few years.  Immunizations are up-to-date for age.      2. Screening for cervical cancer  Assessment & Plan:  Pelvic exam done today as part of her complete physical.  Pap smear of the cervix taken.  Pap has returned normal.  She has some atrophic changes of the cells but no suspicious changes of early cancer.  Exam otherwise normal    Orders:  -     PapIG HPV Age Gdln ACOG; Future  -     PapIG HPV Age Gdln ACOG  -     PapIG, HPV, Rfx 16 / 18; Future  -     PapIG, HPV, Rfx 16 / 18    3. Alcoholism (CMS/HCC)  Assessment & Plan:  Psychological condition is improving with lifestyle modifications.  Continue current treatment regimen.  Regular aerobic exercise.  Psychological condition  will be reassessed at the next regular appointment.    Patient has been in recovery now for over 5 years.  She is done very well and has not had anything to drink for that length of time.  She has no obsession  to drink right now.      4. Vitamin D deficiency  Assessment & Plan:  Patient is taking extra vitamin D.  We will check her level and adjust if needed      5. Gastroesophageal reflux disease with esophagitis  Assessment & Plan:  Patient has reflux symptoms.  As long as she takes a PPI when they act up she does not have any difficulty.  I stressed the importance of treating the symptoms aggressively though.  Over time the esophagus will become injured if she is not careful.      6. Essential hypertension  Assessment & Plan:  Hypertension is improving with treatment.  Continue current treatment regimen.  Dietary sodium restriction.  Weight loss.  Regular aerobic exercise.  Continue current medications.  Blood pressure will be reassessed in 3 months.    Continue losartan/HCTZ.  Her blood pressure is excellent      7. Mixed hyperlipidemia  Assessment & Plan:  Lipid abnormalities are improving with lifestyle modifications.  Nutritional counseling was provided.  Lipids will be reassessed in 6 months.    Patient is doing well.  Her cholesterol and LDL are very acceptable.  Her triglycerides are little elevated so that is pushing her HDL down some.  Low-carb diet, exercise, weight loss will help correct that.  She does not need a statin.      8. Atherosclerosis of native coronary artery of native heart without angina pectoris  Assessment & Plan:  Coronary artery disease is improving with treatment.  Continue current treatment regimen.  Dietary sodium restriction.  Weight loss.  Regular aerobic exercise.  Stop smoking.  Continue current medications.  Medication changes per orders.  Cardiac status will be reassessed in 3 months.    Cath over 10 yrs ago.  Minimal disease  Her blood pressure is well controlled and her lipids are well managed.  She is not having any signs and symptoms of coronary artery disease.  She does need to start exercising more and to lose some weight.  She has plans to do that.      Other orders  -      losartan-hydrochlorothiazide (HYZAAR) 100-25 MG per tablet; Take 1 tablet by mouth Daily for 90 doses.  Dispense: 90 tablet; Refill: 2  -     potassium chloride (K-DUR,KLOR-CON) 20 MEQ CR tablet; Take 1 tablet by mouth 2 (Two) Times a Day for 90 days.  Dispense: 90 tablet; Refill: 2      Wrapup Tab  Return in about 1 year (around 12/4/2020), or if symptoms worsen or fail to improve, for Recheck-lipid.

## 2024-03-25 RX ORDER — FLUOXETINE HYDROCHLORIDE 40 MG/1
80 CAPSULE ORAL DAILY
Qty: 180 CAPSULE | Refills: 1 | Status: SHIPPED | OUTPATIENT
Start: 2024-03-25

## 2024-03-25 RX ORDER — FLUOXETINE HYDROCHLORIDE 40 MG/1
80 CAPSULE ORAL DAILY
Qty: 180 CAPSULE | Refills: 1 | Status: CANCELLED | OUTPATIENT
Start: 2024-03-25

## 2024-05-24 DIAGNOSIS — E55.9 VITAMIN D DEFICIENCY: ICD-10-CM

## 2024-05-24 DIAGNOSIS — I60.9 SUBARACHNOID BLEED: ICD-10-CM

## 2024-05-24 DIAGNOSIS — R91.1 LUNG NODULE SEEN ON IMAGING STUDY: ICD-10-CM

## 2024-05-24 DIAGNOSIS — I10 PRIMARY HYPERTENSION: Primary | ICD-10-CM

## 2024-05-24 DIAGNOSIS — I25.10 ATHEROSCLEROSIS OF NATIVE CORONARY ARTERY OF NATIVE HEART WITHOUT ANGINA PECTORIS: ICD-10-CM

## 2024-05-24 DIAGNOSIS — K21.00 GASTROESOPHAGEAL REFLUX DISEASE WITH ESOPHAGITIS WITHOUT HEMORRHAGE: ICD-10-CM

## 2024-05-28 ENCOUNTER — LAB (OUTPATIENT)
Dept: LAB | Facility: HOSPITAL | Age: 64
End: 2024-05-28
Payer: COMMERCIAL

## 2024-05-28 LAB
25(OH)D3 SERPL-MCNC: 34.9 NG/ML (ref 30–100)
ALBUMIN SERPL-MCNC: 4.6 G/DL (ref 3.5–5.2)
ALBUMIN/GLOB SERPL: 1.6 G/DL
ALP SERPL-CCNC: 79 U/L (ref 39–117)
ALT SERPL W P-5'-P-CCNC: 24 U/L (ref 1–33)
ANION GAP SERPL CALCULATED.3IONS-SCNC: 11 MMOL/L (ref 5–15)
AST SERPL-CCNC: 21 U/L (ref 1–32)
BACTERIA UR QL AUTO: ABNORMAL /HPF
BASOPHILS # BLD AUTO: 0.06 10*3/MM3 (ref 0–0.2)
BASOPHILS NFR BLD AUTO: 0.7 % (ref 0–1.5)
BILIRUB SERPL-MCNC: 1 MG/DL (ref 0–1.2)
BILIRUB UR QL STRIP: NEGATIVE
BUN SERPL-MCNC: 18 MG/DL (ref 8–23)
BUN/CREAT SERPL: 19.6 (ref 7–25)
CALCIUM SPEC-SCNC: 9.3 MG/DL (ref 8.6–10.5)
CHLORIDE SERPL-SCNC: 98 MMOL/L (ref 98–107)
CHOLEST SERPL-MCNC: 206 MG/DL (ref 0–200)
CLARITY UR: CLEAR
CO2 SERPL-SCNC: 27 MMOL/L (ref 22–29)
COLOR UR: ABNORMAL
CREAT SERPL-MCNC: 0.92 MG/DL (ref 0.57–1)
DEPRECATED RDW RBC AUTO: 42.1 FL (ref 37–54)
EGFRCR SERPLBLD CKD-EPI 2021: 69.2 ML/MIN/1.73
EOSINOPHIL # BLD AUTO: 0.46 10*3/MM3 (ref 0–0.4)
EOSINOPHIL NFR BLD AUTO: 5 % (ref 0.3–6.2)
ERYTHROCYTE [DISTWIDTH] IN BLOOD BY AUTOMATED COUNT: 12.2 % (ref 12.3–15.4)
GLOBULIN UR ELPH-MCNC: 2.8 GM/DL
GLUCOSE SERPL-MCNC: 96 MG/DL (ref 65–99)
GLUCOSE UR STRIP-MCNC: NEGATIVE MG/DL
HBA1C MFR BLD: 5.3 % (ref 4.8–5.6)
HCT VFR BLD AUTO: 48.5 % (ref 34–46.6)
HDLC SERPL-MCNC: 42 MG/DL (ref 40–60)
HGB BLD-MCNC: 16.1 G/DL (ref 12–15.9)
HGB UR QL STRIP.AUTO: NEGATIVE
HOLD SPECIMEN: NORMAL
HYALINE CASTS UR QL AUTO: ABNORMAL /LPF
IMM GRANULOCYTES # BLD AUTO: 0.04 10*3/MM3 (ref 0–0.05)
IMM GRANULOCYTES NFR BLD AUTO: 0.4 % (ref 0–0.5)
KETONES UR QL STRIP: NEGATIVE
LDLC SERPL CALC-MCNC: 137 MG/DL (ref 0–100)
LDLC/HDLC SERPL: 3.18 {RATIO}
LEUKOCYTE ESTERASE UR QL STRIP.AUTO: ABNORMAL
LYMPHOCYTES # BLD AUTO: 1.57 10*3/MM3 (ref 0.7–3.1)
LYMPHOCYTES NFR BLD AUTO: 17.1 % (ref 19.6–45.3)
MCH RBC QN AUTO: 31.2 PG (ref 26.6–33)
MCHC RBC AUTO-ENTMCNC: 33.2 G/DL (ref 31.5–35.7)
MCV RBC AUTO: 94 FL (ref 79–97)
MONOCYTES # BLD AUTO: 0.53 10*3/MM3 (ref 0.1–0.9)
MONOCYTES NFR BLD AUTO: 5.8 % (ref 5–12)
NEUTROPHILS NFR BLD AUTO: 6.52 10*3/MM3 (ref 1.7–7)
NEUTROPHILS NFR BLD AUTO: 71 % (ref 42.7–76)
NITRITE UR QL STRIP: NEGATIVE
NRBC BLD AUTO-RTO: 0 /100 WBC (ref 0–0.2)
PH UR STRIP.AUTO: 7.5 [PH] (ref 5–8)
PLATELET # BLD AUTO: 272 10*3/MM3 (ref 140–450)
PMV BLD AUTO: 10 FL (ref 6–12)
POTASSIUM SERPL-SCNC: 3.6 MMOL/L (ref 3.5–5.2)
PROT SERPL-MCNC: 7.4 G/DL (ref 6–8.5)
PROT UR QL STRIP: NEGATIVE
RBC # BLD AUTO: 5.16 10*6/MM3 (ref 3.77–5.28)
RBC # UR STRIP: ABNORMAL /HPF
REF LAB TEST METHOD: ABNORMAL
SODIUM SERPL-SCNC: 136 MMOL/L (ref 136–145)
SP GR UR STRIP: 1.02 (ref 1–1.03)
SQUAMOUS #/AREA URNS HPF: ABNORMAL /HPF
T4 FREE SERPL-MCNC: 1.39 NG/DL (ref 0.93–1.7)
TRIGL SERPL-MCNC: 152 MG/DL (ref 0–150)
TSH SERPL DL<=0.05 MIU/L-ACNC: 0.85 UIU/ML (ref 0.27–4.2)
UROBILINOGEN UR QL STRIP: ABNORMAL
VIT B12 BLD-MCNC: 1277 PG/ML (ref 211–946)
VLDLC SERPL-MCNC: 27 MG/DL (ref 5–40)
WBC # UR STRIP: ABNORMAL /HPF
WBC NRBC COR # BLD AUTO: 9.18 10*3/MM3 (ref 3.4–10.8)

## 2024-05-28 PROCEDURE — 82306 VITAMIN D 25 HYDROXY: CPT | Performed by: FAMILY MEDICINE

## 2024-05-28 PROCEDURE — 82607 VITAMIN B-12: CPT | Performed by: FAMILY MEDICINE

## 2024-05-28 PROCEDURE — 81001 URINALYSIS AUTO W/SCOPE: CPT | Performed by: FAMILY MEDICINE

## 2024-05-28 PROCEDURE — 80061 LIPID PANEL: CPT | Performed by: FAMILY MEDICINE

## 2024-05-28 PROCEDURE — 84439 ASSAY OF FREE THYROXINE: CPT | Performed by: FAMILY MEDICINE

## 2024-05-28 PROCEDURE — 80050 GENERAL HEALTH PANEL: CPT | Performed by: FAMILY MEDICINE

## 2024-05-28 PROCEDURE — 87086 URINE CULTURE/COLONY COUNT: CPT | Performed by: FAMILY MEDICINE

## 2024-05-28 PROCEDURE — 83036 HEMOGLOBIN GLYCOSYLATED A1C: CPT | Performed by: FAMILY MEDICINE

## 2024-05-29 ENCOUNTER — OFFICE VISIT (OUTPATIENT)
Dept: FAMILY MEDICINE CLINIC | Facility: CLINIC | Age: 64
End: 2024-05-29
Payer: COMMERCIAL

## 2024-05-29 VITALS
DIASTOLIC BLOOD PRESSURE: 72 MMHG | HEART RATE: 78 BPM | SYSTOLIC BLOOD PRESSURE: 104 MMHG | RESPIRATION RATE: 16 BRPM | WEIGHT: 150 LBS | OXYGEN SATURATION: 98 % | HEIGHT: 62 IN | BODY MASS INDEX: 27.6 KG/M2

## 2024-05-29 DIAGNOSIS — I10 PRIMARY HYPERTENSION: ICD-10-CM

## 2024-05-29 DIAGNOSIS — R91.1 LUNG NODULE SEEN ON IMAGING STUDY: ICD-10-CM

## 2024-05-29 DIAGNOSIS — I73.9 PAD (PERIPHERAL ARTERY DISEASE): ICD-10-CM

## 2024-05-29 DIAGNOSIS — I60.9 SUBARACHNOID BLEED: ICD-10-CM

## 2024-05-29 DIAGNOSIS — E55.9 VITAMIN D DEFICIENCY: ICD-10-CM

## 2024-05-29 DIAGNOSIS — I25.10 ATHEROSCLEROSIS OF NATIVE CORONARY ARTERY OF NATIVE HEART WITHOUT ANGINA PECTORIS: ICD-10-CM

## 2024-05-29 DIAGNOSIS — Z00.00 PREVENTATIVE HEALTH CARE: Primary | ICD-10-CM

## 2024-05-29 PROCEDURE — 99397 PER PM REEVAL EST PAT 65+ YR: CPT | Performed by: FAMILY MEDICINE

## 2024-05-29 RX ORDER — PHENOL 1.4 %
600 AEROSOL, SPRAY (ML) MUCOUS MEMBRANE DAILY
COMMUNITY

## 2024-05-29 RX ORDER — ASPIRIN 325 MG
325 TABLET, DELAYED RELEASE (ENTERIC COATED) ORAL EVERY 6 HOURS PRN
COMMUNITY

## 2024-05-29 RX ORDER — ROSUVASTATIN CALCIUM 5 MG/1
TABLET, COATED ORAL
Qty: 15 TABLET | Refills: 0 | Status: SHIPPED | OUTPATIENT
Start: 2024-05-29

## 2024-05-29 NOTE — PROGRESS NOTES
"Chief Complaint  Hypertension and Hyperlipidemia    Subjective        Shanon Duvall presents to [facility]  History of Present Illness  The patient is a 63-year-old female who presents for a preventative healthcare visit.    The patient reports persistent fatigue, despite full-time employment. She experienced a fall in 10/2023, resulting in a skull fracture, headlessness, and subarachnoid hemorrhage. Despite prolonged headaches, they have since decreased in frequency. She underwent a calcium score test and heart catheterization, both of which yielded elevated results. A carotid study was also performed, although she is uncertain if it was the right or left. She experiences occasional discomfort on the right side of her neck, which she suspects may be related to her cholesterol levels. Atorvastatin was trialed, but she experienced aches as a side effect. She consumes an energy drink and believes that B12 is beneficial for her cognitive function. She occasionally experiences reflux but does not regularly monitor her blood pressure at home. A scan conducted in 10/2022 revealed osteopenia. She takes Os-Thanh and vitamin D supplements. She maintains an active lifestyle. A chest CT was ordered to evaluate a lung nodule, but she has not yet undergone it. She takes omeprazole for reflux as needed. Her hearing is impaired, and she uses hearing aids at work. Her diet is suboptimal, as she does not like to cook. She is not due for a colonoscopy. She has a uterine cancer but has not had a Pap smear recently. She has never had an abnormal Pap smear and has never had HPV.   She smoked for 30 years.       Objective   Vital Signs:  /72   Pulse 78   Resp 16   Ht 157.5 cm (62\")   Wt 68 kg (150 lb)   SpO2 98%   BMI 27.44 kg/m²   Estimated body mass index is 27.44 kg/m² as calculated from the following:    Height as of this encounter: 157.5 cm (62\").    Weight as of this encounter: 68 kg " (150 lb).               Physical Exam  Constitutional:       Appearance: Normal appearance. She is well-developed and normal weight.   HENT:      Head: Normocephalic and atraumatic.      Right Ear: Tympanic membrane, ear canal and external ear normal.      Left Ear: Tympanic membrane, ear canal and external ear normal.      Nose: Nose normal.      Mouth/Throat:      Mouth: Mucous membranes are moist.      Pharynx: Oropharynx is clear. No oropharyngeal exudate.   Eyes:      Extraocular Movements: Extraocular movements intact.      Conjunctiva/sclera: Conjunctivae normal.      Pupils: Pupils are equal, round, and reactive to light.   Cardiovascular:      Rate and Rhythm: Normal rate and regular rhythm.      Pulses: Normal pulses.      Heart sounds: Normal heart sounds.   Pulmonary:      Effort: Pulmonary effort is normal.      Breath sounds: Normal breath sounds.   Abdominal:      General: Bowel sounds are normal.      Palpations: Abdomen is soft.   Musculoskeletal:         General: Normal range of motion.      Cervical back: Normal range of motion and neck supple.   Skin:     General: Skin is warm and dry.   Neurological:      General: No focal deficit present.      Mental Status: She is alert and oriented to person, place, and time. Mental status is at baseline.   Psychiatric:         Mood and Affect: Mood normal.         Behavior: Behavior normal.         Thought Content: Thought content normal.         Judgment: Judgment normal.        Result Review :          Results  Laboratory Studies  Blood sugar 96. Kidney function tests normal. Electrolytes normal. Calcium, protein, albumin normal. Liver function tests normal. TSH normal. T4 normal. Total cholesterol 206, . A1c 5.3. Vitamin D 34.9. Vitamin B12 over 1000. White blood cell count 9000. Hemoglobin 16. Platelets 272.    Imaging  Ultrasound shows moderate plaquing bilaterally on the carotids, no significant stenosis.                Assessment & Plan  1.  Preventative healthcare.  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 This is her preventative healthcare visit. She is being scheduled soon for a colonoscopy and a mammogram. DEXA scan is updated. She had lab work done recently and we reviewed that today. She is up to date on her Pap smears and she is committed to starting some exercise and following a low carb diet. Her immunizations are up to date.    2. Hypertension.  The patient is under treatment for hypertension. Blood pressure today was excellent, so no changes in her medicines are needed.    3. Peripheral artery disease.  The patient has known peripheral artery disease. She has a 60 to 70 percent lesion in her left carotid picked up on a screening. She also has some coronary artery disease with some 40 percent blockage in the circumflex and in her LAD. None of these are felt to need intervention right now and we are working hard to keep her cholesterol down, her blood pressure down, and other risk factors.    4. Subarachnoid bleed.  The patient did have head trauma last year in 11/2023. She had a large laceration over her right side of the forehead and she had a subarachnoid, from the fall. She recovered without sequela, although she is still slightly tired and fatigued.    5. Lung nodules seen on imaging studies.  This is being followed by serial yearly CT scans of the lungs and she needs to get this year's study done. The previous studies have not shown any growth.    6. Atherosclerosis of the native coronary arteries.  As mentioned above, she has a 40 percent lesion in the LAD and in the circumflex. None of this is far enough along to intervene on, but it is being followed and we are trying to reduce her risks, which includes keeping her blood pressure down, cholesterol down, LDL down and having her exercise a little bit more.    7. Vitamin D deficiency.  The patient has a history of vitamin D  deficiency. We are going to recheck that again today and we will go from there based on the result.            Follow Up     Return in about 1 year (around 5/29/2025), or if symptoms worsen or fail to improve, for Annual physical- one year              .  Patient was given instructions and counseling regarding her condition or for health maintenance advice. Please see specific information pulled into the AVS if appropriate.     Patient or patient representative verbalized consent for the use of Ambient Listening during the visit with  Shay Espinoza MD for chart documentation. 5/29/2024  11:57 EDT

## 2024-05-30 LAB — BACTERIA SPEC AEROBE CULT: NORMAL

## 2024-09-11 RX ORDER — LOSARTAN POTASSIUM AND HYDROCHLOROTHIAZIDE 25; 100 MG/1; MG/1
TABLET ORAL
Qty: 90 TABLET | Refills: 3 | Status: SHIPPED | OUTPATIENT
Start: 2024-09-11

## 2024-09-11 RX ORDER — LOSARTAN POTASSIUM AND HYDROCHLOROTHIAZIDE 25; 100 MG/1; MG/1
TABLET ORAL
Qty: 90 TABLET | Refills: 3 | Status: CANCELLED | OUTPATIENT
Start: 2024-09-11

## 2024-09-19 NOTE — PROGRESS NOTES
Medication: mupirocin (BACTROBAN) 2 % ointment   Medication refill denied due to being a duplicate.     Transitional Care Follow Up Visit  Subjective     Shanon Duvall is a 62 y.o. female who presents for a transitional care management visit.    Within 48 business hours after discharge our office contacted her via telephone to coordinate her care and needs.      I reviewed and discussed the details of that call along with the discharge summary, hospital problems, inpatient lab results, inpatient diagnostic studies, and consultation reports with Shanon.     Current outpatient and discharge medications have been reconciled for the patient.  Reviewed by: Shlomo Valladares MD          10/6/2023     6:36 PM   Date of TCM Phone Call   [facility]   Date of Admission 10/4/2023   Date of Discharge 10/6/2023   Discharge Disposition Home or Self Care     Risk for Readmission (LACE) Score: 6 (10/6/2023  6:00 AM)      History of Present Illness   Course During Hospital Stay: Shanon is a 62-year-old female with past medical history of hypertension, hyperlipidemia on, depression who presents today status post a subarachnoid hemorrhage post a fall.  States that she was walking down the stairs and was carrying a cat crate and missed a step and fell and landed on her right side and hit her head.  She had a laceration of her right eye.  That was sutured and was noted to have a subarachnoid hemorrhage on imaging.  She was consulted by neurosurgery in the hospital who recommended just monitoring the bleed without intervention.  She was instructed to come off her aspirin at that time.  She has not been on aspirin since that time.  She states that since that time she still has some blurriness in the right eye.  States that it is somewhat getting better.  Has had some headaches as well.  They are slowly getting better, but she still not as active as she once was.  She has been taking Tylenol 500 mg for the pain.  She was given Fioricet at the time of discharge.  She underwent a heart cath during the hospitalization due to previous  episodes of chest pain and that was negative for stenting, but that did show some blockages that required statin medications.  She has not started taking those yet.     The following portions of the patient's history were reviewed and updated as appropriate: allergies, current medications, past family history, past medical history, past social history, past surgical history, and problem list.    Review of Systems   Constitutional:  Positive for activity change and fatigue. Negative for fever.   HENT:  Negative for mouth sores, sneezing and sore throat.    Eyes:  Positive for visual disturbance. Negative for photophobia and redness.   Respiratory:  Negative for cough, choking and chest tightness.    Cardiovascular:  Negative for chest pain, palpitations and leg swelling.   Gastrointestinal:  Positive for abdominal pain. Negative for constipation and diarrhea.   Genitourinary:  Negative for dyspareunia, dysuria and enuresis.   Musculoskeletal:  Positive for back pain. Negative for neck pain and neck stiffness.   Skin:  Negative for pallor, rash and wound.   Allergic/Immunologic: Negative for environmental allergies, food allergies and immunocompromised state.   Neurological:  Positive for headaches. Negative for dizziness and facial asymmetry.   Hematological:  Negative for adenopathy. Does not bruise/bleed easily.   Psychiatric/Behavioral:  Negative for sleep disturbance.        Objective   Physical Exam  Constitutional:       Appearance: Normal appearance. She is normal weight.   HENT:      Head: Normocephalic.      Comments: Scar noted all over the right I that appears clean dry and intact, some mild ptosis of the right eye from swelling     Right Ear: Tympanic membrane and ear canal normal.      Left Ear: Tympanic membrane and ear canal normal.      Nose: Nose normal.      Mouth/Throat:      Mouth: Mucous membranes are moist.   Eyes:      General:         Right eye: No discharge.      Extraocular Movements:  Extraocular movements intact.      Conjunctiva/sclera: Conjunctivae normal.      Pupils: Pupils are equal, round, and reactive to light.   Cardiovascular:      Rate and Rhythm: Normal rate and regular rhythm.      Pulses: Normal pulses.      Heart sounds: No murmur heard.  Pulmonary:      Effort: Pulmonary effort is normal. No respiratory distress.      Breath sounds: Normal breath sounds.   Abdominal:      General: Abdomen is flat. Bowel sounds are normal. There is no distension.      Palpations: Abdomen is soft.      Comments: Mild tenderness to palpation over the right flank   Musculoskeletal:         General: No swelling or deformity. Normal range of motion.      Comments: Severe point tenderness over the right hip   Skin:     General: Skin is warm.      Capillary Refill: Capillary refill takes less than 2 seconds.      Coloration: Skin is not jaundiced.      Findings: No bruising.   Neurological:      General: No focal deficit present.      Mental Status: She is alert and oriented to person, place, and time. Mental status is at baseline.      Cranial Nerves: No cranial nerve deficit.   Psychiatric:         Mood and Affect: Mood normal.         Behavior: Behavior normal.         Thought Content: Thought content normal.         Assessment & Plan   Problems Addressed this Visit    None  Visit Diagnoses       Subarachnoid hemorrhage following injury, concussion, with loss of consciousness, subsequent encounter    -  Primary    Relevant Orders    XR Hip With or Without Pelvis 2 - 3 View Right    US Carotid Bilateral          Diagnoses         Codes Comments    Subarachnoid hemorrhage following injury, concussion, with loss of consciousness, subsequent encounter    -  Primary ICD-10-CM: S06.6X9D  ICD-9-CM: V58.89           Shanon is a 62-year-old female who presents today for status post subarachnoid hemorrhage from a fall.  It was a mechanical fall and she had a work-up with a heart cath that showed some coronary  artery disease that she just take cholesterol medication for.  She recently underwent a vascular screening that showed stenosis of her left carotid artery as well.  Still having a lot of pain over the right side of her chest and abdomen.  Likely from a bone bruise versus musculoskeletal, but am concerned slightly for hematoma.  We will get further imaging today.  She is also having point tenderness over her right hip.  We will get an x-ray of the hip to rule out any other cause for that.  She has follow-up with neurosurgery tomorrow.  We will see what the results of the neurosurgery says and what activities that she can do.  We will fill out [facility] paperwork after that time.  Instructed to watch her blood pressure in the next couple weeks as it is on the low end of normal at this time.  If it is becoming lower, we can back down on her losartan hydrochlorothiazide to likely just losartan.

## 2024-10-14 RX ORDER — FLUOXETINE 40 MG/1
80 CAPSULE ORAL DAILY
Qty: 180 CAPSULE | Refills: 1 | Status: SHIPPED | OUTPATIENT
Start: 2024-10-14

## 2024-10-18 RX ORDER — ONDANSETRON 4 MG/1
4 TABLET, ORALLY DISINTEGRATING ORAL EVERY 8 HOURS PRN
Qty: 20 TABLET | Refills: 1 | Status: SHIPPED | OUTPATIENT
Start: 2024-10-18

## 2024-11-01 DIAGNOSIS — I10 PRIMARY HYPERTENSION: ICD-10-CM

## 2024-11-01 DIAGNOSIS — E55.9 VITAMIN D DEFICIENCY: ICD-10-CM

## 2024-11-01 DIAGNOSIS — I73.9 PAD (PERIPHERAL ARTERY DISEASE): ICD-10-CM

## 2024-11-01 DIAGNOSIS — I25.10 ATHEROSCLEROSIS OF NATIVE CORONARY ARTERY OF NATIVE HEART WITHOUT ANGINA PECTORIS: ICD-10-CM

## 2024-11-01 DIAGNOSIS — Z00.00 PREVENTATIVE HEALTH CARE: Primary | ICD-10-CM

## 2024-11-25 RX ORDER — PHENOL 1.4 %
600 AEROSOL, SPRAY (ML) MUCOUS MEMBRANE DAILY
Qty: 90 TABLET | Refills: 2 | Status: SHIPPED | OUTPATIENT
Start: 2024-11-25

## 2025-01-16 DIAGNOSIS — M71.22 SYNOVIAL CYST OF LEFT POPLITEAL SPACE: Primary | ICD-10-CM

## 2025-01-17 ENCOUNTER — HOSPITAL ENCOUNTER (OUTPATIENT)
Dept: ULTRASOUND IMAGING | Facility: HOSPITAL | Age: 65
Discharge: HOME | End: 2025-01-17
Admitting: FAMILY MEDICINE
Payer: COMMERCIAL

## 2025-01-17 DIAGNOSIS — M71.22 SYNOVIAL CYST OF LEFT POPLITEAL SPACE: ICD-10-CM

## 2025-01-17 PROCEDURE — 76882 US LMTD JT/FCL EVL NVASC XTR: CPT

## 2025-02-19 ENCOUNTER — TELEPHONE (OUTPATIENT)
Dept: FAMILY MEDICINE CLINIC | Facility: CLINIC | Age: 65
End: 2025-02-19
Payer: COMMERCIAL

## 2025-02-19 NOTE — TELEPHONE ENCOUNTER
Gave message to patient at 4:12pm and scheduled an appt with YAMILKA Wilhelm for 02/20/2025 at 3pm (30 minutes).

## 2025-02-19 NOTE — TELEPHONE ENCOUNTER
Caller: Shanon Duvall    Relationship: Self    Best call back number:     [phone] (Mobile)       What orders are you requesting (i.e. lab or imaging):      PATIENT IS ASKING FOR:  A CHEST XRAY - SHE IS HAVING A LOT OF COUGH,  AN ANTIBIOTIC   AND BLOOD WORK CMP   MAMMOGRAM     In what timeframe would the patient need to come in: ASAP     Where will you receive your lab/imaging services:     Additional notes:

## 2025-02-20 ENCOUNTER — OFFICE VISIT (OUTPATIENT)
Dept: FAMILY MEDICINE CLINIC | Facility: CLINIC | Age: 65
End: 2025-02-20
Payer: COMMERCIAL

## 2025-02-20 VITALS
HEART RATE: 82 BPM | BODY MASS INDEX: 23.92 KG/M2 | WEIGHT: 130 LBS | DIASTOLIC BLOOD PRESSURE: 70 MMHG | HEIGHT: 62 IN | OXYGEN SATURATION: 99 % | SYSTOLIC BLOOD PRESSURE: 124 MMHG | RESPIRATION RATE: 20 BRPM

## 2025-02-20 DIAGNOSIS — E78.2 MIXED HYPERLIPIDEMIA: ICD-10-CM

## 2025-02-20 DIAGNOSIS — I25.10 ATHEROSCLEROSIS OF NATIVE CORONARY ARTERY OF NATIVE HEART WITHOUT ANGINA PECTORIS: ICD-10-CM

## 2025-02-20 DIAGNOSIS — R05.2 SUBACUTE COUGH: Primary | ICD-10-CM

## 2025-02-20 PROCEDURE — 99213 OFFICE O/P EST LOW 20 MIN: CPT | Performed by: PHYSICIAN ASSISTANT

## 2025-02-20 RX ORDER — AZITHROMYCIN 250 MG/1
TABLET, FILM COATED ORAL
Qty: 6 TABLET | Refills: 0 | Status: SHIPPED | OUTPATIENT
Start: 2025-02-20

## 2025-02-20 NOTE — ASSESSMENT & PLAN NOTE
Labs have been ordered as she would like all these checked today.  Orders:    XR Chest PA & Lateral; Future    CBC Auto Differential; Future    Comprehensive Metabolic Panel; Future    Hemoglobin A1c; Future    Lipid Panel; Future    TSH Rfx On Abnormal To Free T4; Future

## 2025-02-20 NOTE — PROGRESS NOTES
"Chief Complaint  Chief Complaint   Patient presents with    Cough       Subjective        Shanon Duvall is a 64 y.o. female who presents to [facility].  History of Present Illness  Presents today for concerns of a cough she has had since Christmas.  At first cough was productive, now is not.   She was given a Z-Sharath on 12/24/2024, which helped.   Reports increased fatigue since cough began, nasal congestion, nasal drainage, and mild SOA.   Denies productive cough, myalgias, fevers, or sore throat.   Started taking Mucinex DM, which has helped improve her cough, but continues to have scratchy throat and dry cough.  Requests a chest x-ray and antibiotics today.    She also requests all labs to be checked today as she has a history of atherosclerosis and hyperlipidemia.  States she has tried different statins but has not been able to tolerate them.    Objective   /70 (BP Location: Left arm)   Pulse 82   Resp 20   Ht 157.5 cm (62.01\")   Wt 59 kg (130 lb)   SpO2 99%   BMI 23.77 kg/m²     Estimated body mass index is 23.77 kg/m² as calculated from the following:    Height as of this encounter: 157.5 cm (62.01\").    Weight as of this encounter: 59 kg (130 lb).     Physical Exam   GEN: In no acute distress, non toxic appearing  CV: Regular rate and rhythm, no murmurs, 2+ peripheral pulses, No extremity edema.   RESP: Lungs clear to auscultation anteriorly and posteriorly in all lung fields bilaterally.  PSYCH: Affect normal, insight fair     PHQ-2 Depression Screening  Little interest or pleasure in doing things? Not at all   Feeling down, depressed, or hopeless? Not at all   PHQ-2 Total Score 0         Result Review :              Assessment and Plan     Assessment & Plan  Subacute cough  Discussed as she has had a persistent cough and fatigue for the last 2 months without much improvement, chest x-ray has been ordered for further evaluation.  Discussed there is concern for " possible walking pneumonia as well, therefore Z-Sharath has been prescribed for her to take as directed.  Encouraged her to continue Mucinex DM, stay hydrated, and rest.  Orders:    XR Chest PA & Lateral; Future    azithromycin (Zithromax Z-Sharath) 250 MG tablet; Take 2 tablets by mouth on day 1, then 1 tablet daily on days 2-5    Atherosclerosis of native coronary artery of native heart without angina pectoris  Labs have been ordered as she would like all these checked today.  Orders:    XR Chest PA & Lateral; Future    CBC Auto Differential; Future    Comprehensive Metabolic Panel; Future    Hemoglobin A1c; Future    Lipid Panel; Future    TSH Rfx On Abnormal To Free T4; Future    Mixed hyperlipidemia  Due to history of hyperlipidemia, lipid panel has been ordered.  Orders:    Lipid Panel; Future    She will be called with the results of the chest x-ray.  Discussed if she begins to have worsening cough, SOA, or fever, to call our office or proceed to the ER, she agrees.        Follow Up     Return if symptoms worsen or fail to improve.

## 2025-02-24 ENCOUNTER — TELEPHONE (OUTPATIENT)
Dept: FAMILY MEDICINE CLINIC | Facility: CLINIC | Age: 65
End: 2025-02-24
Payer: COMMERCIAL

## 2025-02-24 NOTE — TELEPHONE ENCOUNTER
Caller: Shanon Duvall    Relationship: Self    Best call back number:     [phone] (Mo       What was the call regarding: PATIENT CALLED THE HOSPITAL TO CHECK ON HER LAB ORDERS, AND THE HOSPITAL STATED THAT THEY DON'T SEE THE ORDERS     SHE IS WANTING TO GET DONE IN THE MORNING     PLEASE ADVISE    Is it okay if the provider responds through MyChart: CALL

## 2025-02-25 ENCOUNTER — LAB (OUTPATIENT)
Dept: LAB | Facility: HOSPITAL | Age: 65
End: 2025-02-25
Payer: COMMERCIAL

## 2025-02-25 ENCOUNTER — HOSPITAL ENCOUNTER (OUTPATIENT)
Dept: GENERAL RADIOLOGY | Facility: HOSPITAL | Age: 65
Discharge: HOME | End: 2025-02-25
Payer: COMMERCIAL

## 2025-02-25 DIAGNOSIS — R05.2 SUBACUTE COUGH: ICD-10-CM

## 2025-02-25 DIAGNOSIS — E78.2 MIXED HYPERLIPIDEMIA: ICD-10-CM

## 2025-02-25 DIAGNOSIS — I25.10 ATHEROSCLEROSIS OF NATIVE CORONARY ARTERY OF NATIVE HEART WITHOUT ANGINA PECTORIS: ICD-10-CM

## 2025-02-25 LAB
25(OH)D3 SERPL-MCNC: 35.1 NG/ML (ref 30–100)
ALBUMIN SERPL-MCNC: 3.7 G/DL (ref 3.5–5.2)
ALBUMIN/GLOB SERPL: 1.2 G/DL
ALP SERPL-CCNC: 95 U/L (ref 39–117)
ALT SERPL W P-5'-P-CCNC: 21 U/L (ref 1–33)
ANION GAP SERPL CALCULATED.3IONS-SCNC: 7.3 MMOL/L (ref 5–15)
AST SERPL-CCNC: 22 U/L (ref 1–32)
BACTERIA UR QL AUTO: ABNORMAL /HPF
BASOPHILS # BLD AUTO: 0.1 10*3/MM3 (ref 0–0.2)
BASOPHILS NFR BLD AUTO: 1.1 % (ref 0–1.5)
BILIRUB SERPL-MCNC: 0.3 MG/DL (ref 0–1.2)
BILIRUB UR QL STRIP: NEGATIVE
BUN SERPL-MCNC: 12 MG/DL (ref 8–23)
BUN/CREAT SERPL: 14.6 (ref 7–25)
CALCIUM SPEC-SCNC: 8.9 MG/DL (ref 8.6–10.5)
CHLORIDE SERPL-SCNC: 107 MMOL/L (ref 98–107)
CHOLEST SERPL-MCNC: 191 MG/DL (ref 0–200)
CLARITY UR: CLEAR
CO2 SERPL-SCNC: 22.7 MMOL/L (ref 22–29)
COLOR UR: YELLOW
CREAT SERPL-MCNC: 0.82 MG/DL (ref 0.57–1)
DEPRECATED RDW RBC AUTO: 45.6 FL (ref 37–54)
EGFRCR SERPLBLD CKD-EPI 2021: 79 ML/MIN/1.73
EOSINOPHIL # BLD AUTO: 0.44 10*3/MM3 (ref 0–0.4)
EOSINOPHIL NFR BLD AUTO: 5 % (ref 0.3–6.2)
ERYTHROCYTE [DISTWIDTH] IN BLOOD BY AUTOMATED COUNT: 13.1 % (ref 12.3–15.4)
GLOBULIN UR ELPH-MCNC: 3.2 GM/DL
GLUCOSE SERPL-MCNC: 90 MG/DL (ref 65–99)
GLUCOSE UR STRIP-MCNC: NEGATIVE MG/DL
HBA1C MFR BLD: 4.9 % (ref 4.8–5.6)
HCT VFR BLD AUTO: 47.8 % (ref 34–46.6)
HDLC SERPL-MCNC: 35 MG/DL (ref 40–60)
HGB BLD-MCNC: 15.5 G/DL (ref 12–15.9)
HGB UR QL STRIP.AUTO: NEGATIVE
HOLD SPECIMEN: NORMAL
HYALINE CASTS UR QL AUTO: ABNORMAL /LPF
IMM GRANULOCYTES # BLD AUTO: 0.05 10*3/MM3 (ref 0–0.05)
IMM GRANULOCYTES NFR BLD AUTO: 0.6 % (ref 0–0.5)
KETONES UR QL STRIP: NEGATIVE
LDLC SERPL CALC-MCNC: 125 MG/DL (ref 0–100)
LDLC/HDLC SERPL: 3.46 {RATIO}
LEUKOCYTE ESTERASE UR QL STRIP.AUTO: ABNORMAL
LYMPHOCYTES # BLD AUTO: 1.45 10*3/MM3 (ref 0.7–3.1)
LYMPHOCYTES NFR BLD AUTO: 16.5 % (ref 19.6–45.3)
MCH RBC QN AUTO: 30.6 PG (ref 26.6–33)
MCHC RBC AUTO-ENTMCNC: 32.4 G/DL (ref 31.5–35.7)
MCV RBC AUTO: 94.3 FL (ref 79–97)
MONOCYTES # BLD AUTO: 0.47 10*3/MM3 (ref 0.1–0.9)
MONOCYTES NFR BLD AUTO: 5.3 % (ref 5–12)
NEUTROPHILS NFR BLD AUTO: 6.3 10*3/MM3 (ref 1.7–7)
NEUTROPHILS NFR BLD AUTO: 71.5 % (ref 42.7–76)
NITRITE UR QL STRIP: NEGATIVE
NRBC BLD AUTO-RTO: 0 /100 WBC (ref 0–0.2)
PH UR STRIP.AUTO: 8.5 [PH] (ref 5–8)
PLATELET # BLD AUTO: 239 10*3/MM3 (ref 140–450)
PMV BLD AUTO: 9.1 FL (ref 6–12)
POTASSIUM SERPL-SCNC: 4.8 MMOL/L (ref 3.5–5.2)
PROT SERPL-MCNC: 6.9 G/DL (ref 6–8.5)
PROT UR QL STRIP: NEGATIVE
RBC # BLD AUTO: 5.07 10*6/MM3 (ref 3.77–5.28)
RBC # UR STRIP: ABNORMAL /HPF
REF LAB TEST METHOD: ABNORMAL
SODIUM SERPL-SCNC: 137 MMOL/L (ref 136–145)
SP GR UR STRIP: 1.02 (ref 1–1.03)
SQUAMOUS #/AREA URNS HPF: ABNORMAL /HPF
T4 FREE SERPL-MCNC: 0.9 NG/DL (ref 0.92–1.68)
TRIGL SERPL-MCNC: 174 MG/DL (ref 0–150)
TSH SERPL DL<=0.05 MIU/L-ACNC: 1.15 UIU/ML (ref 0.27–4.2)
UROBILINOGEN UR QL STRIP: ABNORMAL
VIT B12 BLD-MCNC: 1050 PG/ML (ref 211–946)
VLDLC SERPL-MCNC: 31 MG/DL (ref 5–40)
WBC # UR STRIP: ABNORMAL /HPF
WBC NRBC COR # BLD AUTO: 8.81 10*3/MM3 (ref 3.4–10.8)

## 2025-02-25 PROCEDURE — 81001 URINALYSIS AUTO W/SCOPE: CPT | Performed by: FAMILY MEDICINE

## 2025-02-25 PROCEDURE — 84439 ASSAY OF FREE THYROXINE: CPT | Performed by: FAMILY MEDICINE

## 2025-02-25 PROCEDURE — 82306 VITAMIN D 25 HYDROXY: CPT | Performed by: FAMILY MEDICINE

## 2025-02-25 PROCEDURE — 71046 X-RAY EXAM CHEST 2 VIEWS: CPT

## 2025-02-25 PROCEDURE — 82607 VITAMIN B-12: CPT | Performed by: FAMILY MEDICINE

## 2025-02-25 PROCEDURE — 80050 GENERAL HEALTH PANEL: CPT | Performed by: FAMILY MEDICINE

## 2025-02-25 PROCEDURE — 80061 LIPID PANEL: CPT | Performed by: FAMILY MEDICINE

## 2025-02-25 PROCEDURE — 83036 HEMOGLOBIN GLYCOSYLATED A1C: CPT | Performed by: FAMILY MEDICINE

## 2025-02-27 ENCOUNTER — TELEPHONE (OUTPATIENT)
Dept: FAMILY MEDICINE CLINIC | Facility: CLINIC | Age: 65
End: 2025-02-27
Payer: COMMERCIAL

## 2025-02-27 NOTE — TELEPHONE ENCOUNTER
"Yfn HEBERT 2/27/25    \"Please let patient know that result of thyroid function test was entered incorrectly, TSH is within normal limits, and T4 is slightly low.  This can be due to recent illness.  I would like to recheck her thyroid in about 2 months to ensure thyroid function levels are back within normal limits.  Thank you    \"                "

## 2025-03-31 ENCOUNTER — LAB (OUTPATIENT)
Dept: FAMILY MEDICINE CLINIC | Facility: CLINIC | Age: 65
End: 2025-03-31
Payer: COMMERCIAL

## 2025-03-31 ENCOUNTER — OFFICE VISIT (OUTPATIENT)
Dept: FAMILY MEDICINE CLINIC | Facility: CLINIC | Age: 65
End: 2025-03-31
Payer: COMMERCIAL

## 2025-03-31 VITALS
WEIGHT: 130 LBS | HEART RATE: 76 BPM | RESPIRATION RATE: 14 BRPM | OXYGEN SATURATION: 98 % | HEIGHT: 62 IN | SYSTOLIC BLOOD PRESSURE: 122 MMHG | DIASTOLIC BLOOD PRESSURE: 82 MMHG | BODY MASS INDEX: 23.92 KG/M2

## 2025-03-31 DIAGNOSIS — Z12.31 ENCOUNTER FOR SCREENING MAMMOGRAM FOR MALIGNANT NEOPLASM OF BREAST: ICD-10-CM

## 2025-03-31 DIAGNOSIS — E03.9 ACQUIRED HYPOTHYROIDISM: Primary | ICD-10-CM

## 2025-03-31 DIAGNOSIS — E78.2 MIXED HYPERLIPIDEMIA: ICD-10-CM

## 2025-03-31 DIAGNOSIS — I10 PRIMARY HYPERTENSION: ICD-10-CM

## 2025-03-31 PROCEDURE — 84443 ASSAY THYROID STIM HORMONE: CPT | Performed by: FAMILY MEDICINE

## 2025-03-31 PROCEDURE — 80061 LIPID PANEL: CPT | Performed by: FAMILY MEDICINE

## 2025-03-31 PROCEDURE — 84439 ASSAY OF FREE THYROXINE: CPT | Performed by: FAMILY MEDICINE

## 2025-03-31 PROCEDURE — 36415 COLL VENOUS BLD VENIPUNCTURE: CPT | Performed by: FAMILY MEDICINE

## 2025-03-31 RX ORDER — LIFITEGRAST 50 MG/ML
1 SOLUTION/ DROPS OPHTHALMIC ONCE
COMMUNITY

## 2025-03-31 NOTE — PROGRESS NOTES
"Chief Complaint  Cough (Lingering cough and Calcium sore test)    Subjective        Shanon Duvall presents to [facility]  History of Present Illness  The patient is a 64-year-old female who presents for evaluation of acquired hypothyroidism, hypertension, mixed hyperlipidemia, and carotidynia.    She has been experiencing persistent headaches, which are generally alleviated by Tylenol. She recently consulted an ophthalmologist due to severe eye issues and was diagnosed with dry eye. She was prescribed Xiidra, which has been beneficial.    She has a history of carotid artery blockage and occasionally experiences pain in her right carotid artery, although she reports feeling well at present.    She has hearing aids but only uses them sporadically.    She has reduced her work schedule from 4 to 3 days per week and acknowledges the need to incorporate regular exercise into her routine.    She has not undergone a mammogram since relocating to [place].    She has been prescribed Zepbound, resulting in a weight loss of 20 pounds. Initially, she was on a 2.5 mg dose but has since reduced it to 0.5 mg every two weeks as she does not wish to lose further weight. Her current weight is stable at 130 pounds.    She has previously undergone a calcium score test and heart catheterization, which revealed less than 70 percent blockage. She was advised to start Crestor but was unable to tolerate it, as well as another unspecified medication. She is aware of an injectable treatment option but has reservations about potential side effects.    MEDICATIONS  Current: losartan, hydrochlorothiazide, rosuvastatin, Zepbound, Xiidra       Objective   Vital Signs:  /82 (BP Location: Left arm, Patient Position: Sitting, Cuff Size: Adult)   Pulse 76   Resp 14   Ht 157.5 cm (62.01\")   Wt 59 kg (130 lb)   SpO2 98%   BMI 23.77 kg/m²   Estimated body mass index is 23.77 kg/m² as calculated from the " "following:    Height as of this encounter: 157.5 cm (62.01\").    Weight as of this encounter: 59 kg (130 lb).       BMI is within normal parameters. No other follow-up for BMI required.      Physical Exam  Constitutional:       Appearance: Normal appearance. She is well-developed and normal weight.   HENT:      Head: Normocephalic and atraumatic.      Right Ear: Tympanic membrane, ear canal and external ear normal.      Left Ear: Tympanic membrane, ear canal and external ear normal.      Nose: Nose normal.      Mouth/Throat:      Mouth: Mucous membranes are moist.      Pharynx: Oropharynx is clear. No oropharyngeal exudate.   Eyes:      Extraocular Movements: Extraocular movements intact.      Conjunctiva/sclera: Conjunctivae normal.      Pupils: Pupils are equal, round, and reactive to light.   Cardiovascular:      Rate and Rhythm: Normal rate and regular rhythm.      Pulses: Normal pulses.      Heart sounds: Normal heart sounds.   Pulmonary:      Effort: Pulmonary effort is normal.      Breath sounds: Normal breath sounds.   Abdominal:      General: Bowel sounds are normal.      Palpations: Abdomen is soft.   Musculoskeletal:         General: Normal range of motion.      Cervical back: Normal range of motion and neck supple.   Skin:     General: Skin is warm and dry.   Neurological:      General: No focal deficit present.      Mental Status: She is alert and oriented to person, place, and time. Mental status is at baseline.   Psychiatric:         Mood and Affect: Mood normal.         Behavior: Behavior normal.         Thought Content: Thought content normal.         Judgment: Judgment normal.        Result Review :          Results  Laboratory Studies  Urine test showed 3-5 white cells, trace of bacteria. Blood sugar was good. Kidney function tests good. Electrolytes perfect. Calcium, protein, albumin levels indicate good nutrition. Liver function tests are good. T4 and TSH were slightly low. Lipid panel: " Triglycerides and LDL cholesterol are slightly elevated, HDL cholesterol is slightly low.    Imaging  Chest x-ray was normal.                Assessment & Plan  1. Acquired hypothyroidism.  She had blood work done in February 2025, which revealed a low T4 hormone along with a low TSH. She had some illnesses near the time of the labs. She was asked to come back and repeat these studies to see if the T4 would correct. Her TSH should have responded to the low T4 with an elevation. If the results return the same this time, showing a low T4 and a persistently low TSH, central hypothyroidism will be considered. Thyroid replacement therapy will be initiated if necessary, and the pituitary will be evaluated for other hormone dysfunctions.    2. Encounter for screening mammogram.  She is due for a mammogram. 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    3. Primary hypertension.  She is under treatment for hypertension with losartan and hydrochlorothiazide. Her blood pressure today was 122/82. This treatment will be continued indefinitely.    4. Mixed hyperlipidemia.  She is on rosuvastatin, and her most recent lipid panel revealed normal total cholesterol and an LDL of 125. She is not tolerating statins very well. Her risk will be calculated, and if it is over 7.5%, a monoclonal antibody may be considered. Given her past coronary disease, an aggressive approach is warranted.    5. Carotidynia.  She experiences occasional aching in her right carotid, which may be related to carotidynia, often associated with viruses. Heat application and ibuprofen are recommended for symptom relief.    6. Weight management.  She has lost 20 pounds using Zepbound and is currently maintaining her weight at 130 pounds by taking 0.5 mg every two weeks. She does not wish to lose more weight. Continued monitoring of her weight and adjustment of the medication dosage as needed is recommended.    7.  Hearing aid deficiency.  Her ear canals are clean, and her eardrums look okay. She is advised to wear her hearing aids regularly to address her hearing deficiency.    Follow-up  The patient will follow up in August 2025.            Follow Up     Return in about 5 months (around 8/31/2025) for Annual physical.  Patient was given instructions and counseling regarding her condition or for health maintenance advice. Please see specific information pulled into the AVS if appropriate.     Patient or patient representative verbalized consent for the use of Ambient Listening during the visit with  Shay Espinoza MD for chart documentation. 3/31/2025  15:12 EDT  Answers submitted by the patient for this visit:  Problem not listed (Submitted on 3/27/2025)  Chief Complaint: Other medical problem

## 2025-04-01 ENCOUNTER — RESULTS FOLLOW-UP (OUTPATIENT)
Dept: FAMILY MEDICINE CLINIC | Facility: CLINIC | Age: 65
End: 2025-04-01
Payer: COMMERCIAL

## 2025-04-01 LAB
CHOLEST SERPL-MCNC: 208 MG/DL (ref 0–200)
HDLC SERPL-MCNC: 41 MG/DL (ref 40–60)
LDLC SERPL CALC-MCNC: 138 MG/DL (ref 0–100)
LDLC/HDLC SERPL: 3.28 {RATIO}
T4 FREE SERPL-MCNC: 1.08 NG/DL (ref 0.92–1.68)
TRIGL SERPL-MCNC: 163 MG/DL (ref 0–150)
TSH SERPL DL<=0.05 MIU/L-ACNC: 0.97 UIU/ML (ref 0.27–4.2)
VLDLC SERPL-MCNC: 29 MG/DL (ref 5–40)

## 2025-04-01 NOTE — TELEPHONE ENCOUNTER
She is not currently on any statin, I removed that from her list this morning. In the past it is documented that she is unable to tolerate statins.

## 2025-04-01 NOTE — PROGRESS NOTES
Delmis tell Shanon that I am calling in a prescription for a PCSK9 inhibitor.  It is called Repatha and she takes 1 shot every 2 weeks.  If her insurance will cover it I want a wait 3 months and then recheck her cholesterol level.  Low-carb diet exercise are very important.  Because she is had some heart problems already in the past we need to be aggressive in getting her cholesterol down.  Statins are not an option so we have to move to this option.

## 2025-04-01 NOTE — PROGRESS NOTES
Delmis find out from Shanon if she has been using the rosuvastatin.  It is on her list but I think she told me she stopped it.  If she is using it we need to increase the dose.  If she is not using it find out why.  If she got too achy then we may have to switch to something else.

## 2025-04-01 NOTE — TELEPHONE ENCOUNTER
Spoke with Shanon, she verbalized understanding. She requested the Repatha get sent to [facility] instead of [facility]. I resent that RX to [facility].     She wanted it documented that she has tried and failed both rosuvastatin and atorvastatin due to severe muscle, joint and bone pain with both.

## 2025-04-15 ENCOUNTER — HOSPITAL ENCOUNTER (OUTPATIENT)
Dept: MAMMOGRAPHY | Facility: HOSPITAL | Age: 65
Discharge: HOME | End: 2025-04-15
Admitting: FAMILY MEDICINE
Payer: COMMERCIAL

## 2025-04-15 DIAGNOSIS — Z12.31 ENCOUNTER FOR SCREENING MAMMOGRAM FOR MALIGNANT NEOPLASM OF BREAST: ICD-10-CM

## 2025-04-15 PROBLEM — Z78.9 STATIN INTOLERANCE: Status: ACTIVE | Noted: 2025-04-15

## 2025-04-15 PROCEDURE — 77063 BREAST TOMOSYNTHESIS BI: CPT

## 2025-04-15 PROCEDURE — 77067 SCR MAMMO BI INCL CAD: CPT

## 2025-05-13 RX ORDER — LOSARTAN POTASSIUM AND HYDROCHLOROTHIAZIDE 25; 100 MG/1; MG/1
TABLET ORAL
Qty: 90 TABLET | Refills: 3 | Status: SHIPPED | OUTPATIENT
Start: 2025-05-13

## 2025-05-13 RX ORDER — FLUOXETINE HYDROCHLORIDE 40 MG/1
80 CAPSULE ORAL DAILY
Qty: 180 CAPSULE | Refills: 1 | Status: SHIPPED | OUTPATIENT
Start: 2025-05-13

## 2025-07-01 RX ORDER — TIRZEPATIDE 5 MG/.5ML
5 INJECTION, SOLUTION SUBCUTANEOUS WEEKLY
Qty: 2 ML | Refills: 2 | Status: SHIPPED | OUTPATIENT
Start: 2025-07-01

## 2025-08-12 DIAGNOSIS — I25.10 ATHEROSCLEROSIS OF NATIVE CORONARY ARTERY OF NATIVE HEART WITHOUT ANGINA PECTORIS: ICD-10-CM

## 2025-08-12 DIAGNOSIS — E03.9 ACQUIRED HYPOTHYROIDISM: ICD-10-CM

## 2025-08-12 DIAGNOSIS — K21.00 GASTROESOPHAGEAL REFLUX DISEASE WITH ESOPHAGITIS WITHOUT HEMORRHAGE: ICD-10-CM

## 2025-08-12 DIAGNOSIS — Z00.00 PREVENTATIVE HEALTH CARE: Primary | ICD-10-CM

## 2025-08-12 DIAGNOSIS — E78.2 MIXED HYPERLIPIDEMIA: ICD-10-CM

## 2025-08-12 DIAGNOSIS — I73.9 PAD (PERIPHERAL ARTERY DISEASE): ICD-10-CM

## 2025-08-12 DIAGNOSIS — E55.9 VITAMIN D DEFICIENCY: ICD-10-CM

## 2025-08-12 DIAGNOSIS — I10 PRIMARY HYPERTENSION: ICD-10-CM

## 2025-08-13 ENCOUNTER — LAB (OUTPATIENT)
Dept: LAB | Facility: HOSPITAL | Age: 65
End: 2025-08-13
Payer: COMMERCIAL

## 2025-08-13 LAB
25(OH)D3 SERPL-MCNC: 33.2 NG/ML (ref 30–100)
ALBUMIN SERPL-MCNC: 4.6 G/DL (ref 3.5–5.2)
ALBUMIN/GLOB SERPL: 1.9 G/DL
ALP SERPL-CCNC: 84 U/L (ref 39–117)
ALT SERPL W P-5'-P-CCNC: 19 U/L (ref 1–33)
ANION GAP SERPL CALCULATED.3IONS-SCNC: 14.9 MMOL/L (ref 5–15)
AST SERPL-CCNC: 23 U/L (ref 1–32)
BACTERIA UR QL AUTO: ABNORMAL /HPF
BASOPHILS # BLD AUTO: 0.08 10*3/MM3 (ref 0–0.2)
BASOPHILS NFR BLD AUTO: 1 % (ref 0–1.5)
BILIRUB SERPL-MCNC: 0.4 MG/DL (ref 0–1.2)
BILIRUB UR QL STRIP: NEGATIVE
BUN SERPL-MCNC: 15.9 MG/DL (ref 8–23)
BUN/CREAT SERPL: 15.9 (ref 7–25)
CALCIUM SPEC-SCNC: 9.5 MG/DL (ref 8.6–10.5)
CHLORIDE SERPL-SCNC: 100 MMOL/L (ref 98–107)
CHOLEST SERPL-MCNC: 136 MG/DL (ref 0–200)
CLARITY UR: CLEAR
CO2 SERPL-SCNC: 28.1 MMOL/L (ref 22–29)
COLOR UR: YELLOW
CREAT SERPL-MCNC: 1 MG/DL (ref 0.57–1)
DEPRECATED RDW RBC AUTO: 43.4 FL (ref 37–54)
EGFRCR SERPLBLD CKD-EPI 2021: 62.3 ML/MIN/1.73
EOSINOPHIL # BLD AUTO: 0.53 10*3/MM3 (ref 0–0.4)
EOSINOPHIL NFR BLD AUTO: 6.6 % (ref 0.3–6.2)
ERYTHROCYTE [DISTWIDTH] IN BLOOD BY AUTOMATED COUNT: 12.8 % (ref 12.3–15.4)
GLOBULIN UR ELPH-MCNC: 2.4 GM/DL
GLUCOSE SERPL-MCNC: 81 MG/DL (ref 65–99)
GLUCOSE UR STRIP-MCNC: NEGATIVE MG/DL
HBA1C MFR BLD: 4.92 % (ref 4.8–5.6)
HCT VFR BLD AUTO: 46 % (ref 34–46.6)
HDLC SERPL-MCNC: 42 MG/DL (ref 40–60)
HGB BLD-MCNC: 15 G/DL (ref 12–15.9)
HGB UR QL STRIP.AUTO: ABNORMAL
HYALINE CASTS UR QL AUTO: ABNORMAL /LPF
IMM GRANULOCYTES # BLD AUTO: 0.03 10*3/MM3 (ref 0–0.05)
IMM GRANULOCYTES NFR BLD AUTO: 0.4 % (ref 0–0.5)
KETONES UR QL STRIP: NEGATIVE
LDLC SERPL CALC-MCNC: 62 MG/DL (ref 0–100)
LDLC/HDLC SERPL: 1.32 {RATIO}
LEUKOCYTE ESTERASE UR QL STRIP.AUTO: ABNORMAL
LYMPHOCYTES # BLD AUTO: 2.65 10*3/MM3 (ref 0.7–3.1)
LYMPHOCYTES NFR BLD AUTO: 33.2 % (ref 19.6–45.3)
MCH RBC QN AUTO: 30.4 PG (ref 26.6–33)
MCHC RBC AUTO-ENTMCNC: 32.6 G/DL (ref 31.5–35.7)
MCV RBC AUTO: 93.1 FL (ref 79–97)
MONOCYTES # BLD AUTO: 0.52 10*3/MM3 (ref 0.1–0.9)
MONOCYTES NFR BLD AUTO: 6.5 % (ref 5–12)
NEUTROPHILS NFR BLD AUTO: 4.17 10*3/MM3 (ref 1.7–7)
NEUTROPHILS NFR BLD AUTO: 52.3 % (ref 42.7–76)
NITRITE UR QL STRIP: NEGATIVE
NRBC BLD AUTO-RTO: 0 /100 WBC (ref 0–0.2)
PH UR STRIP.AUTO: 7.5 [PH] (ref 5–8)
PLATELET # BLD AUTO: 255 10*3/MM3 (ref 140–450)
PMV BLD AUTO: 9.3 FL (ref 6–12)
POTASSIUM SERPL-SCNC: 3.5 MMOL/L (ref 3.5–5.2)
PROT SERPL-MCNC: 7 G/DL (ref 6–8.5)
PROT UR QL STRIP: NEGATIVE
RBC # BLD AUTO: 4.94 10*6/MM3 (ref 3.77–5.28)
RBC # UR STRIP: ABNORMAL /HPF
REF LAB TEST METHOD: ABNORMAL
SODIUM SERPL-SCNC: 143 MMOL/L (ref 136–145)
SP GR UR STRIP: 1.02 (ref 1–1.03)
SQUAMOUS #/AREA URNS HPF: ABNORMAL /HPF
T4 FREE SERPL-MCNC: 1.09 NG/DL (ref 0.92–1.68)
TRIGL SERPL-MCNC: 192 MG/DL (ref 0–150)
TSH SERPL DL<=0.05 MIU/L-ACNC: 0.84 UIU/ML (ref 0.27–4.2)
UROBILINOGEN UR QL STRIP: ABNORMAL
VIT B12 BLD-MCNC: 1143 PG/ML (ref 211–946)
VLDLC SERPL-MCNC: 32 MG/DL (ref 5–40)
WBC # UR STRIP: ABNORMAL /HPF
WBC NRBC COR # BLD AUTO: 7.98 10*3/MM3 (ref 3.4–10.8)

## 2025-08-13 PROCEDURE — 36415 COLL VENOUS BLD VENIPUNCTURE: CPT | Performed by: FAMILY MEDICINE

## 2025-08-13 PROCEDURE — 82306 VITAMIN D 25 HYDROXY: CPT | Performed by: FAMILY MEDICINE

## 2025-08-13 PROCEDURE — 83036 HEMOGLOBIN GLYCOSYLATED A1C: CPT | Performed by: FAMILY MEDICINE

## 2025-08-13 PROCEDURE — 87077 CULTURE AEROBIC IDENTIFY: CPT | Performed by: FAMILY MEDICINE

## 2025-08-13 PROCEDURE — 84439 ASSAY OF FREE THYROXINE: CPT | Performed by: FAMILY MEDICINE

## 2025-08-13 PROCEDURE — 80061 LIPID PANEL: CPT | Performed by: FAMILY MEDICINE

## 2025-08-13 PROCEDURE — 81001 URINALYSIS AUTO W/SCOPE: CPT | Performed by: FAMILY MEDICINE

## 2025-08-13 PROCEDURE — 82607 VITAMIN B-12: CPT | Performed by: FAMILY MEDICINE

## 2025-08-13 PROCEDURE — 87086 URINE CULTURE/COLONY COUNT: CPT | Performed by: FAMILY MEDICINE

## 2025-08-13 PROCEDURE — 87186 SC STD MICRODIL/AGAR DIL: CPT | Performed by: FAMILY MEDICINE

## 2025-08-13 PROCEDURE — 80050 GENERAL HEALTH PANEL: CPT | Performed by: FAMILY MEDICINE

## 2025-08-16 LAB — BACTERIA SPEC AEROBE CULT: ABNORMAL

## (undated) DEVICE — ELECTRD DEFIB M/FUNC PROPADZ RADIOL 2PK

## (undated) DEVICE — GLIDESHEATH SLENDER STAINLESS STEEL KIT: Brand: GLIDESHEATH SLENDER

## (undated) DEVICE — RADIFOCUS OPTITORQUE ANGIOGRAPHIC CATHETER: Brand: OPTITORQUE

## (undated) DEVICE — ST ACC MICROPUNCTURE STFF/CANN PLAT/TP 4F 21G 40CM

## (undated) DEVICE — THE VASC BAND HEMOSTAT IS A COMPRESSION DEVICE TO ASSIST HEMOSTASIS OF ARTERIAL, VENOUS AND HEMODIALYSIS PERCUTANEOUS ACCESS SITES.: Brand: VASC BAND™ HEMOSTAT

## (undated) DEVICE — GW EMR FIX EXCHG J STD .035 3MM 260CM

## (undated) DEVICE — PK TRY HEART CATH 50